# Patient Record
Sex: MALE | Race: WHITE | Employment: OTHER | URBAN - METROPOLITAN AREA
[De-identification: names, ages, dates, MRNs, and addresses within clinical notes are randomized per-mention and may not be internally consistent; named-entity substitution may affect disease eponyms.]

---

## 2021-12-02 ENCOUNTER — OFFICE VISIT (OUTPATIENT)
Dept: FAMILY MEDICINE CLINIC | Age: 46
End: 2021-12-02
Payer: COMMERCIAL

## 2021-12-02 VITALS
RESPIRATION RATE: 16 BRPM | OXYGEN SATURATION: 98 % | BODY MASS INDEX: 19.35 KG/M2 | SYSTOLIC BLOOD PRESSURE: 110 MMHG | TEMPERATURE: 98.3 F | HEART RATE: 75 BPM | WEIGHT: 105.13 LBS | DIASTOLIC BLOOD PRESSURE: 72 MMHG | HEIGHT: 62 IN

## 2021-12-02 DIAGNOSIS — Z13.220 ENCOUNTER FOR LIPID SCREENING FOR CARDIOVASCULAR DISEASE: ICD-10-CM

## 2021-12-02 DIAGNOSIS — H04.123 DRY EYES, BILATERAL: ICD-10-CM

## 2021-12-02 DIAGNOSIS — Z13.6 ENCOUNTER FOR LIPID SCREENING FOR CARDIOVASCULAR DISEASE: ICD-10-CM

## 2021-12-02 DIAGNOSIS — K59.09 CHRONIC CONSTIPATION: ICD-10-CM

## 2021-12-02 DIAGNOSIS — G71.00 MUSCULAR DYSTROPHY (HCC): Primary | ICD-10-CM

## 2021-12-02 DIAGNOSIS — H91.92 HEARING LOSS OF LEFT EAR, UNSPECIFIED HEARING LOSS TYPE: ICD-10-CM

## 2021-12-02 PROCEDURE — 99204 OFFICE O/P NEW MOD 45 MIN: CPT | Performed by: STUDENT IN AN ORGANIZED HEALTH CARE EDUCATION/TRAINING PROGRAM

## 2021-12-02 RX ORDER — HYPROMELLOSE 3 MG/G
10 GEL OPHTHALMIC
COMMUNITY
End: 2021-12-02 | Stop reason: SDUPTHER

## 2021-12-02 RX ORDER — HYPROMELLOSE 3 MG/G
10 GEL OPHTHALMIC
Qty: 1 EACH | Refills: 1 | Status: SHIPPED | OUTPATIENT
Start: 2021-12-02 | End: 2021-12-03 | Stop reason: CLARIF

## 2021-12-02 RX ORDER — POLYETHYLENE GLYCOL 400 AND PROPYLENE GLYCOL 4; 3 MG/ML; MG/ML
1 SOLUTION/ DROPS OPHTHALMIC 4 TIMES DAILY
COMMUNITY
End: 2021-12-02 | Stop reason: SDUPTHER

## 2021-12-02 RX ORDER — POLYETHYLENE GLYCOL 400 AND PROPYLENE GLYCOL 4; 3 MG/ML; MG/ML
1 SOLUTION/ DROPS OPHTHALMIC 4 TIMES DAILY
Qty: 1 EACH | Refills: 1 | Status: SHIPPED | OUTPATIENT
Start: 2021-12-02 | End: 2022-02-25 | Stop reason: SDUPTHER

## 2021-12-02 NOTE — PROGRESS NOTES
Chief Complaint   Patient presents with    New Patient    Muscular Dystrophy    Dry Eye    Irritable Bowel Syndrome     1. \"Have you been to the ER, urgent care clinic since your last visit? Hospitalized since your last visit? \" No    2. \"Have you seen or consulted any other health care providers outside of the [address] [place] [address] since your last visit? \" No     3. For patients aged 39-70: Has the patient had a colonoscopy / FIT/ Cologuard?  No     No immunization record noted on [employer]

## 2021-12-02 NOTE — PROGRESS NOTES
Manuel, 42 y.o.,  male presents to the office accompanied by his brother. Patient recently moved from [facility] and seeking established care. Chief Complaint   Patient presents with    New Patient    Muscular Dystrophy    Dry Eye    Constipation       SUBJECTIVE  History of present illness. History obtained from patient's brother. Verbal contact with patient was difficult due to cognitive impairment. 1.  Muscular dystrophy  Diagnosed at age 11-9. Followed with neurology in [facility]. No medications. Patient has difficulty walking, patient's brother requests wheelchair. 2.  Chronic constipation. Was on many different medications. Was on Trulance but exhausted medication. Fiber intake was increased with some relief. Followed with GI in [place]. Requests GI consult. History of cholelithiasis. 3.  Hearing loss  Reports hearing loss due to cerumen impaction and \"benign tumor\" on the left side. Followed with ENT specialist in [place]. Requests referral to ENT. 4. Dry eyes  Uses Systane artificial tears (drops and ointment) daily.   Exhausted medications and seeking refill    ROS:  General ROS: no fever, weight loss, fatigue  Ophthalmic ROS: no eye discharge, matting, pain, blurring of vision  ENT ROS: no congestion, sinus pressure, nasal discharge, sore throat  Respiratory ROS: no cough, shortness of breath, or wheezing  Cardiovascular ROS: no chest pain or dyspnea on exertion  Gastrointestinal ROS: no abdominal pain, change in bowel habits, or black or bloody stools  Genito-Urinary ROS: no dysuria, trouble voiding, or hematuria  Neurological ROS: no TIA or stroke symptoms  Dermatological ROS:no lesions no rash    OBJECTIVE    Physical Exam:     Visit Vitals  /72 (BP 1 Location: Left arm, BP Patient Position: Sitting, BP Cuff Size: Large adult)   Pulse 75   Temp 98.3 °F (36.8 °C) (Temporal)   Resp 16   Ht 5' 2\" (1.575 m)   Wt 105 lb 2 oz (47.7 kg)   SpO2 98%   BMI 19.23 kg/m² General: alert, well-appearing, in no apparent distress or pain  Head: atraumatic. Non-tender maxillary and frontal sinuses  Eyes: Drooping lids bilaterally noted, no discharge, no matting, conjunctivae clear and non injected, full EOMs, PERLLA  Ears: pinna non-tender, external auditory canal patent, TM intact  Mouth/throat: teeth, gums, palate normal appearing, moist oral mucosa, tonsils non enlarged, pharynx non erythematous and no lesion  Neck: supple, no JVD , no carotid bruit, no adenopathy palpated  CVS: normal rate, regular rhythm, distinct S1 and S2, no murmurs, rubs clicks or gallops  Lungs: Breathing not labored, good chest excursion, clear to ausculation bilaterally, no crackles, wheezing or rhonchi noted  Abdomen:soft, tender in LLQ, no organomegaly  Extremities: no edema  Skin: warm, no lesions, rashes noted  Psych: alert and oriented x3, mood normal      ASSESSMENT/PLAN  Diagnoses and all orders for this visit:    1. Muscular dystrophy ([facility] [address]  -     REFERRAL TO NEUROLOGY  -     CBC WITH AUTOMATED DIFF; Future  -     METABOLIC PANEL, COMPREHENSIVE; Future  Condition will be managed by the specialist.  Referral to neurology for further management. Labs ordered. Prescription for wheelchair provided. 2. Chronic constipation  -     REFERRAL TO GASTROENTEROLOGY  -     psyllium husk, aspartame, 3 gram pwpk; Take 3 g by mouth two (2) times a day. Start with 3 g once daily, than increase slowly to 2-3 times daily  Patient was advised to eat more fiber, drink plenty of fluids. Bulk forming laxative recommended. Patient was advised to start taking psyllium as prescribed. Drink plenty of fluids (~6 glasses/daily). Referral gastroenterology placed for further management. 3. Hearing loss of left ear, unspecified hearing loss type  -     REFERRAL TO ENT-OTOLARYNGOLOGY  4. Dry eyes, bilateral  -     peg 400-propylene glycol (Systane, propylene glycoL,) 0.4-0.3 % drop;  Administer 1 Drop to both eyes four (4) times daily. -     artificial tears,hypromellose, (Systane GeL) 0.3 % gel ophthalmic ointment; Administer 10 g to both eyes nightly. Continue using drops as prescribed. 5. Encounter for lipid screening for cardiovascular disease  -     LIPID PANEL; Future        Follow-up and Dispositions    · Return in about 7 weeks (around 1/20/2022) for annual physical exam and labs review.

## 2021-12-02 NOTE — PATIENT INSTRUCTIONS
Muscle Conditioning: Exercises  Introduction  Here are some examples of exercises for muscle conditioning. Start each exercise slowly. Ease off the exercise if you start to have pain. Your doctor or physical therapist will tell you when you can start these exercises and which ones will work best for you. How to do the exercises  Wall push-ups    When you can do this exercise against a wall comfortably (without your muscles feeling tired), you can try it against a counter. Start with 5 repetitions again and work up to 8 to 12. You can then slowly progress to the end of a couch or a sturdy chair, and finally to the floor. 1. Stand facing a wall, about 12 to 18 inches away. 2. Place your hands on the wall at shoulder height. 3. Slowly bend your elbows and bring your face toward the wall, moving your hips and shoulders forward together. 4. Push slowly back to the starting position. 5. Start with 5 repetitions and work up to 8 to 12. 6. Rest for a minute, and repeat the exercise. Knee extension    If this exercise becomes easy, you can add a light weight around your ankle or tie an elastic resistance band to a chair leg and one ankle. 1. While sitting in a chair, straighten one leg and hold while you slowly count to 5. Be sure you do not lock your knee. 2. Repeat 8 to 12 times. 3. Rest for a minute, and repeat the exercise. 4. Do the same exercise with the other leg. Side-lying leg lift    If this exercise becomes easy, you can add a light weight around your ankle or tie an elastic resistance band to both ankles. 1. Lie on your side, with your legs extended. Keep your hips straight up and down during this exercise. Do not let your top hip rock toward the back. Support your head with your hand, and place the other hand on the floor near your waist.  2. Slowly raise your upper leg until it is about in line with your shoulder. Keep your toes pointed forward.   3. Slowly lower your leg to the starting position. 4. Repeat 8 to 12 times. 5. Rest for a minute, and repeat the exercise. 6. Turn to your other side and do the same exercise with your other leg. Shallow standing knee bends    1. Stand with your hands lightly resting on a counter or chair in front of you with your feet shoulder-width apart. 2. Slowly bend your knees so that you squat down just like you were going to sit in a chair. Make sure your knees do not go in front of your toes. 3. Lower yourself about 6 inches. Your heels should remain on the floor at all times. 4. Rise slowly to a standing position. 5. Repeat 8 to 12 times. 6. Rest for a minute, and repeat the exercise. Follow-up care is a key part of your treatment and safety. Be sure to make and go to all appointments, and call your doctor if you are having problems. It's also a good idea to know your test results and keep a list of the medicines you take. Where can you learn more? Go to http://www.gray.com/  Enter P608 in the search box to learn more about \"Muscle Conditioning: Exercises. \"  Current as of: May 12, 2021               Content Version: 13.0  © 2006-2021 [employer]. Care instructions adapted under license by [employer] (which disclaims liability or warranty for this information). If you have questions about a medical condition or this instruction, always ask your healthcare professional. [place] [address] any warranty or liability for your use of this information. Muscular Dystrophy in Children: Care Instructions  Your Care Instructions  Finding out that your child has muscular dystrophy (MD) can be upsetting. You may be worried about your child's future. But many people with this disease live an active life. There are several types of muscular dystrophy. Each type affects the body differently. Some types show up early, and others show up later.  But all types are caused by inherited genes that weaken muscles. Your child's doctor may do tests to know what type your child has. These include genetic tests and a biopsy of your child's muscles. There are three common types of muscular dystrophy. · Duchenne muscular dystrophy mostly affects boys starting at age 1 to 11. Boys with Duchenne MD may be unable to walk by age 15. They may also need a respirator to breathe. · Facioscapulohumeral MD affects boys and girls in their teen years. This type causes weakness in the face, arm, and leg muscles. This weakness may be mild or disabling. · Myotonic MD can appear at any age in boys or girls and usually develops slowly. It causes muscle spasms that make it hard to relax. The muscles also weaken and get smaller. It can be mild or severe. There are many treatments to help your child stay as active as possible. These include medicine, physical therapy, and devices to support the muscles. You can work with your doctor to make a treatment plan. Raising a child who has muscular dystrophy can be hard. It may help to join a support group or talk with other parents who have a child with special needs, so you don't feel alone. You may also want to try counseling. It could help you understand and deal with all the emotions you may feel. Follow-up care is a key part of your child's treatment and safety. Be sure to make and go to all appointments, and call your doctor if your child is having problems. It's also a good idea to know your child's test results and keep a list of the medicines your child takes. How can you care for your child at home? · Learn how to do range-of-motion exercises with your child. These can help your child's joints stay flexible. They can also help keep the back straight. A physical therapist can help you set up a schedule and teach you how to do the exercises. · Talk to your doctor about special devices to help your child keep good posture and stay active.   ? Braces can help the hands and lower legs stay straight and be flexible. Back supports or corsets help keep the back straight. ? Standing walkers will help your child stand. This is important to keep bones strong and the back straight. ? Wheelchairs help a child with weak legs get around and do activities. · Be safe with medicines. Have your child take medicines exactly as prescribed. Call your doctor if you think your child is having a problem with his or her medicine. You will get more details on the specific medicines your doctor prescribes. · If your child has pain from inflamed joints, talk to your doctor about over-the-counter pain medicine. If he or she recommends it, give acetaminophen (Tylenol) or ibuprofen (Advil, Motrin) for fever, pain, or fussiness. Read and follow all instructions on the label. · Do not give your child two or more pain medicines at the same time unless the doctor told you to. Many pain medicines have acetaminophen, which is Tylenol. Too much acetaminophen (Tylenol) can be harmful. · If it's hard for your child to close his or her eyes completely, try eye patches or sleep masks at night. · Gently massage your child's limbs and joints. This can help with pain and stiffness. Heat will help too. Put a warm, moist cloth on the sore area. Handling the challenges of muscular dystrophy  · Learn about the disease. This will help you know what you can do to help your child. Then you don't have to fear the unknown. · Focus on your child's strengths. Let your child know that you love and believe in him or her. · Give your child some responsibility for his or her care. Children who have a say in their treatment often stay healthier. · Be aware of possible challenges. Children who have muscular dystrophy may have more social, emotional, and educational problems. · Consider joining a support group. If you share your experiences with parents who have challenges like yours, you may feel better.  You may also want to try counseling. · Be realistic. Do the best you can, and know that you can't control everything. When should you call for help? Call 911 anytime you think your child may need emergency care. For example, call if:    · Your child has trouble breathing or swallowing. Call your doctor now or seek immediate medical care if:    · Your child has any vision problems.     · Your child seems to be getting weaker. Watch closely for changes in your child's health, and be sure to contact your doctor if:    · Your child strains when having a bowel movement and seems to be constipated.     · You want to learn more about muscular dystrophy. Where can you learn more? Go to http://colleen-wild.info/  Enter G255 in the search box to learn more about \"Muscular Dystrophy in Children: Care Instructions. \"  Current as of: April 8, 2021               Content Version: 13.0  © 0647-8706 [employer]. Care instructions adapted under license by [employer] (which disclaims liability or warranty for this information). If you have questions about a medical condition or this instruction, always ask your healthcare professional. [address] any warranty or liability for your use of this information. Constipation: Care Instructions  Your Care Instructions     Constipation means that you have a hard time passing stools (bowel movements). People pass stools from 3 times a day to once every 3 days. What is normal for you may be different. Constipation may occur with pain in the rectum and cramping. The pain may get worse when you try to pass stools. Sometimes there are small amounts of bright red blood on toilet paper or the surface of stools. This is because of enlarged veins near the rectum (hemorrhoids). A few changes in your diet and lifestyle may help you avoid ongoing constipation. Your doctor may also prescribe medicine to help loosen your stool.   Some medicines can cause constipation. These include pain medicines and antidepressants. Tell your doctor about all the medicines you take. Your doctor may want to make a medicine change to ease your symptoms. Follow-up care is a key part of your treatment and safety. Be sure to make and go to all appointments, and call your doctor if you are having problems. It's also a good idea to know your test results and keep a list of the medicines you take. How can you care for yourself at home? · Drink plenty of fluids. If you have kidney, heart, or liver disease and have to limit fluids, talk with your doctor before you increase the amount of fluids you drink. · Include high-fiber foods in your diet each day. These include fruits, vegetables, beans, and whole grains. · Get at least 30 minutes of exercise on most days of the week. Walking is a good choice. You also may want to do other activities, such as running, swimming, cycling, or playing tennis or team sports. · Take a fiber supplement, such as Citrucel or Metamucil, every day. Read and follow all instructions on the label. · Schedule time each day for a bowel movement. A daily routine may help. Take your time having your bowel movement. · Support your feet with a small step stool when you sit on the toilet. This helps flex your hips and places your pelvis in a squatting position. · Your doctor may recommend an over-the-counter laxative to relieve your constipation. Examples are Milk of Magnesia and MiraLax. Read and follow all instructions on the label. Do not use laxatives on a long-term basis. When should you call for help? Call your doctor now or seek immediate medical care if:    · You have new or worse belly pain.     · You have new or worse nausea or vomiting.     · You have blood in your stools. Watch closely for changes in your health, and be sure to contact your doctor if:    · Your constipation is getting worse.     · You do not get better as expected. Where can you learn more? Go to http://www.PernixData.com/  Enter P343 in the search box to learn more about \"Constipation: Care Instructions. \"  Current as of: July 1, 2021               Content Version: 13.0  © 9310-5076 [employer]. Care instructions adapted under license by [employer] (which disclaims liability or warranty for this information). If you have questions about a medical condition or this instruction, always ask your healthcare professional. [place] [address] any warranty or liability for your use of this information.

## 2021-12-03 RX ORDER — HYPROMELLOSE 3 MG/G
1 GEL OPHTHALMIC
Qty: 10 G | Refills: 2 | Status: SHIPPED | OUTPATIENT
Start: 2021-12-03 | End: 2022-04-22

## 2022-01-06 ENCOUNTER — HOSPITAL ENCOUNTER (OUTPATIENT)
Dept: LAB | Age: 47
Discharge: HOME | End: 2022-01-06
Payer: COMMERCIAL

## 2022-01-06 DIAGNOSIS — Z13.220 ENCOUNTER FOR LIPID SCREENING FOR CARDIOVASCULAR DISEASE: ICD-10-CM

## 2022-01-06 DIAGNOSIS — Z13.6 ENCOUNTER FOR LIPID SCREENING FOR CARDIOVASCULAR DISEASE: ICD-10-CM

## 2022-01-06 DIAGNOSIS — G71.00 MUSCULAR DYSTROPHY (HCC): ICD-10-CM

## 2022-01-06 LAB
ALBUMIN SERPL-MCNC: 3.4 G/DL (ref 3.4–5)
ALBUMIN/GLOB SERPL: 1.3 {RATIO} (ref 0.8–1.7)
ALP SERPL-CCNC: 119 U/L (ref 45–117)
ALT SERPL-CCNC: 99 U/L (ref 16–61)
ANION GAP SERPL CALC-SCNC: 1 MMOL/L (ref 3–18)
AST SERPL-CCNC: 62 U/L (ref 10–38)
BASOPHILS # BLD: 0 K/UL (ref 0–0.1)
BASOPHILS NFR BLD: 1 % (ref 0–2)
BILIRUB SERPL-MCNC: 1.3 MG/DL (ref 0.2–1)
BUN SERPL-MCNC: 13 MG/DL (ref 7–18)
BUN/CREAT SERPL: 32 (ref 12–20)
CALCIUM SERPL-MCNC: 9 MG/DL (ref 8.5–10.1)
CHLORIDE SERPL-SCNC: 107 MMOL/L (ref 100–111)
CHOLEST SERPL-MCNC: 152 MG/DL
CO2 SERPL-SCNC: 33 MMOL/L (ref 21–32)
CREAT SERPL-MCNC: 0.41 MG/DL (ref 0.6–1.3)
DIFFERENTIAL METHOD BLD: ABNORMAL
EOSINOPHIL # BLD: 0.1 K/UL (ref 0–0.4)
EOSINOPHIL NFR BLD: 3 % (ref 0–5)
ERYTHROCYTE [DISTWIDTH] IN BLOOD BY AUTOMATED COUNT: 14.2 % (ref 11.6–14.5)
GLOBULIN SER CALC-MCNC: 2.7 G/DL (ref 2–4)
GLUCOSE SERPL-MCNC: 85 MG/DL (ref 74–99)
HCT VFR BLD AUTO: 46.5 % (ref 36–48)
HDLC SERPL-MCNC: 84 MG/DL (ref 40–60)
HDLC SERPL: 1.8 {RATIO} (ref 0–5)
HGB BLD-MCNC: 14.3 G/DL (ref 13–16)
IMM GRANULOCYTES # BLD AUTO: 0 K/UL (ref 0–0.04)
IMM GRANULOCYTES NFR BLD AUTO: 0 % (ref 0–0.5)
LDLC SERPL CALC-MCNC: 53.8 MG/DL (ref 0–100)
LIPID PROFILE,FLP: ABNORMAL
LYMPHOCYTES # BLD: 1.5 K/UL (ref 0.9–3.6)
LYMPHOCYTES NFR BLD: 36 % (ref 21–52)
MCH RBC QN AUTO: 28.7 PG (ref 24–34)
MCHC RBC AUTO-ENTMCNC: 30.8 G/DL (ref 31–37)
MCV RBC AUTO: 93.2 FL (ref 78–100)
MONOCYTES # BLD: 0.3 K/UL (ref 0.05–1.2)
MONOCYTES NFR BLD: 7 % (ref 3–10)
NEUTS SEG # BLD: 2.2 K/UL (ref 1.8–8)
NEUTS SEG NFR BLD: 53 % (ref 40–73)
NRBC # BLD: 0 K/UL (ref 0–0.01)
NRBC BLD-RTO: 0 PER 100 WBC
PLATELET # BLD AUTO: 173 K/UL (ref 135–420)
PMV BLD AUTO: 11.3 FL (ref 9.2–11.8)
POTASSIUM SERPL-SCNC: 5.2 MMOL/L (ref 3.5–5.5)
PROT SERPL-MCNC: 6.1 G/DL (ref 6.4–8.2)
RBC # BLD AUTO: 4.99 M/UL (ref 4.35–5.65)
SODIUM SERPL-SCNC: 141 MMOL/L (ref 136–145)
TRIGL SERPL-MCNC: 71 MG/DL (ref ?–150)
VLDLC SERPL CALC-MCNC: 14.2 MG/DL
WBC # BLD AUTO: 4.1 K/UL (ref 4.6–13.2)

## 2022-01-06 PROCEDURE — 80053 COMPREHEN METABOLIC PANEL: CPT

## 2022-01-06 PROCEDURE — 85025 COMPLETE CBC W/AUTO DIFF WBC: CPT

## 2022-01-06 PROCEDURE — 80061 LIPID PANEL: CPT

## 2022-01-06 PROCEDURE — 36415 COLL VENOUS BLD VENIPUNCTURE: CPT

## 2022-01-10 NOTE — PROGRESS NOTES
Please notify the patient regarding his lab results which showed elevated bilirubin, liver enzymes, alk phosphatase. Please advise patient to follow-up with gastroenterology. Referral to gastroenterology for further evaluation and management was placed on 12/2/2021.

## 2022-01-10 NOTE — PROGRESS NOTES
[phone] (home)  (HIPPA verified brother Miriam Connolly) 2 patient identifiers verified with Miriam Connolly in reference to his brother Nora Parra (name/). Miriam Connolly was advised that his brother Wenceslao Medley labs showed elevated bilirubin, liver enzymes, and alk phosphatase. Miriam Connolly was advised to keep new patient appointment for his brother Wenceslao Medley with [facility]. Per Miriam Truptiole he did not make the appointment that was scheduled in December because he though it was to far. Advised Miriam Connolly due to his brother's insurance [employer] he has to be seen by [facility], information given to contact  and labs fax to [phone]. Stress the importance of following up with GI and Miriam Console voice complete understanding.

## 2022-02-10 ENCOUNTER — TELEPHONE (OUTPATIENT)
Dept: FAMILY MEDICINE CLINIC | Age: 47
End: 2022-02-10

## 2022-02-10 NOTE — TELEPHONE ENCOUNTER
Pt brother came into the office to request a shower chair. Please call Feng Higgins at your earliest convenience.

## 2022-02-10 NOTE — TELEPHONE ENCOUNTER
Spoke with Mary Blood whom was informed that this would be address at his brother's upcoming appointment.

## 2022-02-25 ENCOUNTER — OFFICE VISIT (OUTPATIENT)
Dept: FAMILY MEDICINE CLINIC | Age: 47
End: 2022-02-25
Payer: COMMERCIAL

## 2022-02-25 VITALS
HEART RATE: 72 BPM | BODY MASS INDEX: 19.88 KG/M2 | RESPIRATION RATE: 16 BRPM | DIASTOLIC BLOOD PRESSURE: 62 MMHG | OXYGEN SATURATION: 100 % | HEIGHT: 62 IN | TEMPERATURE: 97.4 F | SYSTOLIC BLOOD PRESSURE: 94 MMHG | WEIGHT: 108 LBS

## 2022-02-25 DIAGNOSIS — H91.92 HEARING LOSS OF LEFT EAR, UNSPECIFIED HEARING LOSS TYPE: ICD-10-CM

## 2022-02-25 DIAGNOSIS — R26.89 BALANCE PROBLEM: ICD-10-CM

## 2022-02-25 DIAGNOSIS — H04.123 DRY EYES, BILATERAL: ICD-10-CM

## 2022-02-25 DIAGNOSIS — R79.89 ELEVATED LFTS: ICD-10-CM

## 2022-02-25 DIAGNOSIS — Q38.3 TONGUE ABNORMALITY: ICD-10-CM

## 2022-02-25 DIAGNOSIS — G71.00 MUSCULAR DYSTROPHY (HCC): Primary | ICD-10-CM

## 2022-02-25 DIAGNOSIS — K59.09 CHRONIC CONSTIPATION: ICD-10-CM

## 2022-02-25 PROCEDURE — 99214 OFFICE O/P EST MOD 30 MIN: CPT | Performed by: FAMILY MEDICINE

## 2022-02-25 RX ORDER — POLYETHYLENE GLYCOL 400 AND PROPYLENE GLYCOL 4; 3 MG/ML; MG/ML
2 SOLUTION/ DROPS OPHTHALMIC 4 TIMES DAILY
Qty: 30 ML | Refills: 1 | Status: SHIPPED | OUTPATIENT
Start: 2022-02-25 | End: 2022-04-22 | Stop reason: SDUPTHER

## 2022-02-25 NOTE — PATIENT INSTRUCTIONS
Muscular Dystrophy in Children: Care Instructions  Your Care Instructions  Finding out that your child has muscular dystrophy (MD) can be upsetting. You may be worried about your child's future. But many people with this disease live an active life. There are several types of muscular dystrophy. Each type affects the body differently. Some types show up early, and others show up later. But all types are caused by inherited genes that weaken muscles. Your child's doctor may do tests to know what type your child has. These include genetic tests and a biopsy of your child's muscles. There are three common types of muscular dystrophy. · Duchenne muscular dystrophy mostly affects boys starting at age 1 to 11. Boys with Duchenne MD may be unable to walk by age 15. They may also need a respirator to breathe. · Facioscapulohumeral MD affects boys and girls in their teen years. This type causes weakness in the face, arm, and leg muscles. This weakness may be mild or disabling. · Myotonic MD can appear at any age in boys or girls and usually develops slowly. It causes muscle spasms that make it hard to relax. The muscles also weaken and get smaller. It can be mild or severe. There are many treatments to help your child stay as active as possible. These include medicine, physical therapy, and devices to support the muscles. You can work with your doctor to make a treatment plan. Raising a child who has muscular dystrophy can be hard. It may help to join a support group or talk with other parents who have a child with special needs, so you don't feel alone. You may also want to try counseling. It could help you understand and deal with all the emotions you may feel. Follow-up care is a key part of your child's treatment and safety. Be sure to make and go to all appointments, and call your doctor if your child is having problems.  It's also a good idea to know your child's test results and keep a list of the medicines your child takes. How can you care for your child at home? · Learn how to do range-of-motion exercises with your child. These can help your child's joints stay flexible. They can also help keep the back straight. A physical therapist can help you set up a schedule and teach you how to do the exercises. · Talk to your doctor about special devices to help your child keep good posture and stay active. ? Braces can help the hands and lower legs stay straight and be flexible. Back supports or corsets help keep the back straight. ? Standing walkers will help your child stand. This is important to keep bones strong and the back straight. ? Wheelchairs help a child with weak legs get around and do activities. · Be safe with medicines. Have your child take medicines exactly as prescribed. Call your doctor if you think your child is having a problem with his or her medicine. You will get more details on the specific medicines your doctor prescribes. · If your child has pain from inflamed joints, talk to your doctor about over-the-counter pain medicine. If he or she recommends it, give acetaminophen (Tylenol) or ibuprofen (Advil, Motrin) for fever, pain, or fussiness. Read and follow all instructions on the label. · Do not give your child two or more pain medicines at the same time unless the doctor told you to. Many pain medicines have acetaminophen, which is Tylenol. Too much acetaminophen (Tylenol) can be harmful. · If it's hard for your child to close his or her eyes completely, try eye patches or sleep masks at night. · Gently massage your child's limbs and joints. This can help with pain and stiffness. Heat will help too. Put a warm, moist cloth on the sore area. Handling the challenges of muscular dystrophy  · Learn about the disease. This will help you know what you can do to help your child. Then you don't have to fear the unknown. · Focus on your child's strengths.  Let your child know that you love and believe in him or her. · Give your child some responsibility for his or her care. Children who have a say in their treatment often stay healthier. · Be aware of possible challenges. Children who have muscular dystrophy may have more social, emotional, and educational problems. · Consider joining a support group. If you share your experiences with parents who have challenges like yours, you may feel better. You may also want to try counseling. · Be realistic. Do the best you can, and know that you can't control everything. When should you call for help? Call 911 anytime you think your child may need emergency care. For example, call if:    · Your child has trouble breathing or swallowing. Call your doctor now or seek immediate medical care if:    · Your child has any vision problems.     · Your child seems to be getting weaker. Watch closely for changes in your child's health, and be sure to contact your doctor if:    · Your child strains when having a bowel movement and seems to be constipated.     · You want to learn more about muscular dystrophy. Where can you learn more? Go to http://www.gray.com/  Enter G255 in the search box to learn more about \"Muscular Dystrophy in Children: Care Instructions. \"  Current as of: April 8, 2021               Content Version: 13.0  © 3410-1683 [employer]. Care instructions adapted under license by [employer] (which disclaims liability or warranty for this information). If you have questions about a medical condition or this instruction, always ask your healthcare professional. [place] [address] any warranty or liability for your use of this information.

## 2022-02-25 NOTE — PROGRESS NOTES
Chief Complaint   Patient presents with    Muscular Dystrophy     requesting shower chair    Constipation     chronic    Other     needs some DME supplies and help with eye drop Rx     1. \"Have you been to the ER, urgent care clinic since your last visit? Hospitalized since your last visit? \" No    2. \"Have you seen or consulted any other health care providers outside of the [address] [place] [address] since your last visit? \" ENT- Dr. Raudel Lopez,      3. For patients aged 39-70: Has the patient had a colonoscopy / FIT/ Cologuard?  NA - based on age    Needs the following referrals:  Neurology- balance  Pulmonary- coughs routinely while eating

## 2022-02-25 NOTE — PROGRESS NOTES
HISTORY OF PRESENT ILLNESS  Cyril Riedel is a 42 y.o. male. HPI: New to me. Transferred from [facility] ([place], [place]). Recently patient moved to the area with his brother from [place] after her father passed away. Unable to obtain much history from patient as due to cognitive dysfunction. Most of the information came from brother  Patient sitting comfortable without any acute distress. History of muscular dystrophy. Currently waiting to get established with a local neurologist.  According to brother appetite has been low. He eats 1 meal a day. That has been going on since he moved here few months back. Looked well dressed. Skin tone and color fair. No signs of dehydration. Asking for shower chair as he has unsteady gait and balance trouble due to muscular dystrophy. Used to have a shower chair and now needed a new one. We will give a prescription. No recent fall. Walking with minimal help. Also reviewed prior labs. Noted elevated LFT and total bilirubin along with alkaline phosphatase. He does have history of gallstones. Denies any abdominal pain. Will obtain liver ultrasound. He supposed to see [facility] gastroenterologist and brother has not made a follow-up appointment as he is also new to the area and was not oriented to the [place] area. At this time provided their contact number and advised to make an appointment with the specialist to have further evaluation. We will get liver ultrasound meantime. Avoid fried and fatty food. No signs of anemia on labs. Vitals been fairly stable. He has macroglossia and bumpy tongue. Has seen ENT for hearing trouble. I do not have records to review. Will obtain records. Visit Vitals  BP 94/62 (BP 1 Location: Left arm, BP Patient Position: Sitting, BP Cuff Size: Adult)   Pulse 72   Temp 97.4 °F (36.3 °C) (Temporal)   Resp 16   Ht 5' 2\" (1.575 m)   Wt 108 lb (49 kg)   SpO2 100%   BMI 19.75 kg/m²     Review medication list, vitals, problem list,allergies. Lab Results   Component Value Date/Time    WBC 4.1 (L) 01/06/2022 03:06 PM    HGB 14.3 01/06/2022 03:06 PM    HCT 46.5 01/06/2022 03:06 PM    PLATELET 959 38/77/7089 03:06 PM    MCV 93.2 01/06/2022 03:06 PM     Lab Results   Component Value Date/Time    Sodium 141 01/06/2022 03:06 PM    Potassium 5.2 01/06/2022 03:06 PM    Chloride 107 01/06/2022 03:06 PM    CO2 33 (H) 01/06/2022 03:06 PM    Anion gap 1 (L) 01/06/2022 03:06 PM    Glucose 85 01/06/2022 03:06 PM    BUN 13 01/06/2022 03:06 PM    Creatinine 0.41 (L) 01/06/2022 03:06 PM    BUN/Creatinine ratio 32 (H) 01/06/2022 03:06 PM    GFR est AA >60 01/06/2022 03:06 PM    GFR est non-AA >60 01/06/2022 03:06 PM    Calcium 9.0 01/06/2022 03:06 PM    Bilirubin, total 1.3 (H) 01/06/2022 03:06 PM    Alk. phosphatase 119 (H) 01/06/2022 03:06 PM    Protein, total 6.1 (L) 01/06/2022 03:06 PM    Albumin 3.4 01/06/2022 03:06 PM    Globulin 2.7 01/06/2022 03:06 PM    A-G Ratio 1.3 01/06/2022 03:06 PM    ALT (SGPT) 99 (H) 01/06/2022 03:06 PM    AST (SGOT) 62 (H) 01/06/2022 03:06 PM     Lab Results   Component Value Date/Time    Cholesterol, total 152 01/06/2022 03:06 PM    HDL Cholesterol 84 (H) 01/06/2022 03:06 PM    LDL, calculated 53.8 01/06/2022 03:06 PM    VLDL, calculated 14.2 01/06/2022 03:06 PM    Triglyceride 71 01/06/2022 03:06 PM    CHOL/HDL Ratio 1.8 01/06/2022 03:06 PM     ROS: see HPI     Physical Exam    ASSESSMENT and PLAN    ICD-10-CM ICD-9-CM    1. Muscular dystrophy ([facility] [address]): Some balance trouble. Giving shower chair. Risk for fall. Also provided number for neurology to establish care locally G71.00 359.1 Shower Chair XX silvio   2. Chronic constipation: Symptomatic treatment. Sending to GI. Advised Metamucil over-the-counter K59.09 564.00 REFERRAL TO GASTROENTEROLOGY      US LIVER   3. Hearing loss of left ear, unspecified hearing loss type: Has seen ENT. Will obtain notes H91.92 389.9    4. Dry eyes, bilateral: Given symptomatic treatment H04. 123 375.15 5. Balance problem: Giving shower chair prescription and also advised to use support to prevent fall R26.89 781.99 Shower Chair XX silvio   6. Tongue abnormality: Advised to discuss it with the ENT Q38.3 750.10    7. Elevated LFTs: Obtaining liver ultrasound and provider #4 gastroenterologist to make an appointment R79.89 790.6 [address] [place] [address] understood the plan and agree to proceed with it  Follow-up and Dispositions    · Return in about 2 months (around 4/25/2022). Please note that this dictation was completed with [employer], the [employer] voice recognition software. Quite often unanticipated grammatical, syntax, homophones, and other interpretive errors are inadvertently transcribed by the computer software. Please disregard these errors. Please excuse any errors that have escaped final proofreading.

## 2022-03-28 ENCOUNTER — OFFICE VISIT (OUTPATIENT)
Dept: NEUROLOGY | Age: 47
End: 2022-03-28
Payer: COMMERCIAL

## 2022-03-28 VITALS
OXYGEN SATURATION: 98 % | BODY MASS INDEX: 19.32 KG/M2 | DIASTOLIC BLOOD PRESSURE: 60 MMHG | HEART RATE: 100 BPM | SYSTOLIC BLOOD PRESSURE: 100 MMHG | RESPIRATION RATE: 16 BRPM | HEIGHT: 62 IN | WEIGHT: 105 LBS

## 2022-03-28 DIAGNOSIS — G71.11 MYOTONIC DYSTROPHY, TYPE 1 (HCC): Primary | ICD-10-CM

## 2022-03-28 DIAGNOSIS — G71.11 MYOTONIC DYSTROPHY, TYPE 1 (HCC): ICD-10-CM

## 2022-03-28 PROCEDURE — 99204 OFFICE O/P NEW MOD 45 MIN: CPT | Performed by: PSYCHIATRY & NEUROLOGY

## 2022-03-28 NOTE — PROGRESS NOTES
Jeff Howard presents today for   Chief Complaint   Patient presents with    Neurologic Problem    New Patient       Is someone accompanying this pt? Yes, Brother    Is the patient using any DME equipment during [address]? no    Depression Screening:  3 most recent PHQ Screens 12/2/2021   Little interest or pleasure in doing things Not at all   Feeling down, depressed, irritable, or hopeless Not at all   Total Score PHQ 2 0       Learning Assessment:  Learning Assessment 12/2/2021   PRIMARY LEARNER Patient   HIGHEST LEVEL OF EDUCATION - PRIMARY LEARNER  GRADUATED HIGH SCHOOL OR GED   BARRIERS PRIMARY LEARNER [address] CAREGIVER Yes   CO-LEARNER NAME Genny Burrows (brother)   [id number]    PRIMARY LANGUAGE ENGLISH   PRIMARY LANGUAGE CO-LEARNER ENGLISH    NEED No   LEARNER PREFERENCE PRIMARY DEMONSTRATION     LISTENING     READING     VIDEOS     OTHER (COMMENT)   LEARNER [address] No   ANSWERED BY Jared Loza (brother)   RELATIONSHIP SELF       Abuse Screening:  Abuse Screening Questionnaire 12/2/2021   Do you ever feel afraid of your partner? N   Are you in a relationship with someone who physically or mentally threatens you? N   Is it safe for you to go home? Y       Fall Risk  No flowsheet data found. Coordination of Care:  1. Have you been to the ER, urgent care clinic since your last visit? Hospitalized since your last visit? no    2. Have you seen or consulted any other health care providers outside of the [address] [place] [address] since your last visit? Include any pap smears or colon screening.  no

## 2022-03-28 NOTE — PROGRESS NOTES
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 Patient cannot walk without assistance, ptosis, lower extremity weakness, face is long and narrow, tongue fissures, lower extremity weakness, patient slow but understand and follows commands, , Gi problems, constipation, trouble swallow. Social History     Socioeconomic History    Marital status: SINGLE     Spouse name: Not on file    Number of children: Not on file    Years of education: Not on file    Highest education level: Not on file   Occupational History    Not on file   Tobacco Use    Smoking status: Never Smoker    Smokeless tobacco: Never Used   Vaping Use    Vaping Use: Never used   Substance and Sexual Activity    Alcohol use: Never    Drug use: Never    Sexual activity: Never   Other Topics Concern    Not on file   Social History Narrative    Not on file     Social Determinants of Health     Financial Resource Strain:     Difficulty of Paying Living Expenses: Not on file   Food Insecurity:     Worried About Running Out of Food in the Last Year: Not on file    Jeff of Food in the Last Year: Not on file   Transportation Needs:     Lack of Transportation (Medical): Not on file    Lack of Transportation (Non-Medical):  Not on file   Physical Activity:     Days of Exercise per Week: Not on file    Minutes of Exercise per Session: Not on file   Stress:     Feeling of Stress : Not on file   Social Connections:     Frequency of Communication with Friends and Family: Not on file    Frequency of Social Gatherings with Friends and Family: Not on file    Attends Restorationist Services: Not on file    Active Member of Clubs or Organizations: Not on file    Attends Club or Organization Meetings: Not on file    Marital Status: Not on file   Intimate Partner Violence:     Fear of Current or Ex-Partner: Not on file    Emotionally Abused: Not on file    Physically Abused: Not on file    Sexually Abused: Not on file   Housing Stability:     Unable to Pay for Housing in the Last Year: Not on file    Number of [place] in the Last Year: Not on file    Unstable Housing in the Last Year: Not on file       History reviewed. No pertinent family history. Current Outpatient Medications   Medication Sig Dispense Refill    Shower Chair XX silvio Need during shower 1 Each 0    Systane, propylene glycoL, 0.4-0.3 % drop Administer 2 Drops to both eyes four (4) times daily. 30 mL 1    artificial tears,hypromellose, (Systane GeL) 0.3 % gel ophthalmic ointment Administer 1 g to both eyes nightly. 10 g 2    psyllium husk, aspartame, 3 gram pwpk Take 3 g by mouth two (2) times a day. Start with 3 g once daily, than increase slowly to 2-3 times daily 54 Each 0       History reviewed. No pertinent past medical history. No past surgical history on file. No Known Allergies    There is no problem list on file for this patient. Review of Systems:   As above       PHYSICAL EXAMINATION:      VITAL SIGNS:    Visit Vitals  /60 Comment: retaken   Pulse 100 Comment: retaken   Resp 16   Ht 5' 2\" (1.575 m)   Wt 47.6 kg (105 lb)   SpO2 98%   BMI 19.20 kg/m²       GENERAL: The patient is in no apparent distress. EXTREMITIES: No clubbing, cyanosis, or edema is identified.      NEUROLOGIC EXAMINATION  Mental status: Awake oriented to month but not to year, oriented to place and follow simple commands, speech not clear, word finding difficulty  CN: visual field seems intact, ptosis, limited lateral gaze B/L, PERRLA, face sensation intact , long and narrow face,  5/5 bilat, tongue midline with fissures  Motor: upper good strength poor effort, mild hypotonia, lower extremity iliopsoas4/5, QA-4/5, AT and GA-4-5/5, with left toe weakness, neck flexor weakness. Sensory: intact to light touch throughout  Coordination: FN w/o dysmetria  DTR: 2+ throughout, toes-0, plantar equivocal.  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 Patient cannot walk without assistance, ptosis, lower extremity weakness, face is long and narrow, tongue fissures, lower extremity weakness, patient slow but understand and follows commands, , Gi problems, constipation, trouble swallow. Patient denies pain or paresthesias. MYOTONIC DYSTROPHY TYPE 1    1-PT AND OT  2-Swallow evaluation. 3-Check basic labs  TSH, B 12, HB AIC. 4-Primary care to evaluate for cardiology consult for arrhythmias, pulmonary evaluation, constipation. 5-Ophthalmology follow up.  5-mri brain. 6-referral to Neuromuscular clinic. Plan: As above. I spent 45 minutes with the patient in face-to-face consultation, of which greater than 50% was spent in counseling and coordination of care as described above. PLEASE NOTE:   This document has been produced using voice recognition software. Unrecognized errors in transcription may be present.

## 2022-03-30 DIAGNOSIS — G71.11 MYOTONIC DYSTROPHY, TYPE 1 (HCC): Primary | ICD-10-CM

## 2022-04-08 ENCOUNTER — TELEPHONE (OUTPATIENT)
Dept: FAMILY MEDICINE CLINIC | Age: 47
End: 2022-04-08

## 2022-04-08 NOTE — TELEPHONE ENCOUNTER
Pt's brother called stating he was supposed to receive a shower chair and they have not heard anything. Pt took a fall on yesterday, brother states he seems okay, no physical signs of hurt. Please advise.

## 2022-04-12 ENCOUNTER — HOSPITAL ENCOUNTER (OUTPATIENT)
Dept: PHYSICAL THERAPY | Age: 47
Discharge: HOME | End: 2022-04-12
Payer: COMMERCIAL

## 2022-04-12 PROCEDURE — 97162 PT EVAL MOD COMPLEX 30 MIN: CPT

## 2022-04-12 PROCEDURE — 97167 OT EVAL HIGH COMPLEX 60 MIN: CPT

## 2022-04-12 NOTE — PROGRESS NOTES
PT DAILY TREATMENT NOTE/NEURO EVAL     Patient Name: Makayla Elliott  Date:2022  : 1975  [x]  Patient  Verified  Payor: [address] / Plan: [address] / Product Type: Managed Care Medicaid /    In time: 2:18  Out time:3:05  Total Treatment Time (min): 52  Visit #: 1 of 8    Treatment Area: Muscle weakness [M62.81]    SUBJECTIVE  Pain Level (0-10 scale): 0/10   []constant []intermittent []improving []worsening []no change since onset    Any medication changes, allergies to medications, adverse drug reactions, diagnosis change, or new procedure performed?: [x] No    [] Yes (see summary sheet for update)  Subjective functional status/changes:     PLOF: Patient lives with brother and has a home attendant with him to assist as needed. Limitations to PLOF: decreased stability/balance, weakness, impaired gait   Mechanism of Injury: Patient presents with generalized muscle weakness. 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 Patient's brother and home attendant is with him during the evaluation. Patient's brother answered most of the questions as patient has some verbal deficits and difficulty answering more of the complex questions. Patient is able to follow simple commands. Patient has some impulsive tendencies and decreased safety awareness. Per patient's brother he doesn't typically experience pain in arms and legs. According to patient's brother the patient frequently falls, most recent being 22. Patient's brother stated he missed the bed and fell. Per patient's brother no lasting injury, and brother monitored patient throughout the day and was fine, so patient did not go to the hospital. Patient stated he frequently trips and has a tendency to scuff his toe. Patient's brother indicated he walks bare foot on the carpet. Patient does not use an AD, but stated he uses furniture/walls to assist with stability.  For community ambulation, patient's brother pushes him in a WC sometimes, due to concern about him walking on gravel/rocks. Current symptoms/Complaints: Patient has increased difficulty with walking, sit to stand transfers, poor cervical control, visual disturbance and tendency to move quickly out of the bed. Patient also has difficulty navigating curb independently to get into the apartment, and patient's brother stated his legs buckle frequently (onset approximately January 2022), and also occasionally legs will lock. Previous Treatment/Compliance: No prior PT. Patient lived in [facility] and had difficulty getting into PT. PMHx/Surgical Hx: 2019- cataracts removed. According to the patient's brother: No previous Back/hip/knee ankle/foot surg. No pacemaker, no cardiac issues, and no history of cancer. Work Hx: n/a   Living Situation: 1 step into building with no rail. Pt Goals: in chart   Cognition: A & O x  2  Other:    OBJECTIVE/EXAMINATION  Domestic Life: someone is typically always home with patient. If patient is sleeping patient's brother may step out for an hour, but typically has someone with him. Self Care: dressing with min assist or supervised by patient's brother. 32 min [x]Eval                  []Re-Eval       15 min Therapeutic Exercise:  [x] See flow sheet : seated: HR, march, LAQ- 3 reps, Stand: hip abduction-3x, mini squat-3x, side step- 2x   Rationale: increase ROM and increase strength to improve the patients ability to perform ADLs safely. With   [] TE   [] TA   [] neuro   [] other: Patient Education: [x] Review HEP    [] Progressed/Changed HEP based on:   [] positioning   [] body mechanics   [] transfers   [] heat/ice application    [] other:      Other Objective/Functional Measures:     Physical Therapy Evaluation  Neurologic    Posture: [] Poor    [x] Fair    [] Good    Describe:  Forward head/rounded shoulder        Gait: [] Normal    [x] Abnormal    Device: No AD Describe: decreased step/stride length, decreased body awareness, decreased stability and needed gait belt with CGA/Min assist to correct imbalances, and for occasional knees buckling. ROM:                                 AROM    PROM   Knee Left Right Left Right   Ext [facility]     Flex [facility] WFL               AROM                           PROM  Hip Left Right Left Right   Flex [facility] WFL     Ext       [facility]     ER [facility] WFL     IR                                           Strength (MMT):  Shoulder L (1-5) R (1-5)   Shoulder Flexion     Shoulder Ext     Shoulder ABD     Shoulder ADD     Shoulder IR     Shoulder ER                                               Hip L (1-5) R (1-5)   Hip Flexion 4- 4-   Hip Ext     Hip ABD 4- 4-   Hip ADD     Hip ER     Hip IR       Knee L (1-5) R (1-5)   Knee Flexion 4 4   Knee Extension 4 4   Ankle PF     Ankle DF 3+ 3+   Other       Motor Control: alternating toe tap was [facility]     Sensation: light touch sensation assessed and patient confirmed he could feel the light touch on Lower leg. Balance/ Equilibrium:    Standing on Floor: Romberg EO/EC therapist provided CGA due to concern of instability and knee buckling. Functional Mobility      Bed Mobility:      Scooting: Able to perform a bridge with buttock clearance. Rolling: difficulty rolling toward the right. SBA with        Sit-Supine: SBA. Monitored for neck control       Transfers:       Sit-Stand: SBA/CGA        Behavior: [x] Cooperative    [x] Impulsive    [] Agitated    [] Perseverative    [] Confused   Oriented x:2    Cognition: [x] One Step Commands   [] Multiple Commands   [] Displays Neglect [] R  [] L    Other:       Impaired Vision:  [x] Y    [] N      Safety Awareness Deficits  [x] Y    [] N      Able to Express Needs [x] Y    [] N-  Patient was able to converse with Therapist, but therapist had some difficulty understanding at points.      Other test /comments:       Pain Level (0-10 scale) post treatment: 0/10    ASSESSMENT/Changes in Function: See POC. Patient reported no pain at end of the session, and confirmed throughout the evaluation that he was not experiencing pain with the exercises. Therapist used a gait belt for standing exercises secondary to safety concerns. Patient will continue to benefit from skilled PT services to modify and progress therapeutic interventions, address functional mobility deficits, address ROM deficits, address strength deficits, analyze and address soft tissue restrictions, analyze and cue movement patterns, analyze and modify body mechanics/ergonomics, assess and modify postural abnormalities, address imbalance/dizziness and instruct in home and community integration to attain remaining goals.      [x]  See Plan of Care  []  See progress note/recertification  []  See Discharge Summary         Progress towards goals / Updated goals:  See POC    PLAN  []  Upgrade activities as tolerated     [x]  Continue plan of care  []  Update interventions per flow sheet       []  Discharge due to:_  []  Other:_      Andrea Walter, PT 4/12/2022  1:16 PM

## 2022-04-12 NOTE — PROGRESS NOTES
[facility] [place] [employer] [facility]  [address] [facility]  [phone] fax    Plan of Care/Statement of Necessity for Occupational Therapy Services    Patient name: Annika Enriquez Start of Care: 2022   Referral source: Khoa Yoo MD : 1975    Medical Diagnosis: Muscle weakness [M62.81]  Payor: [address] [place] [address] Road / Plan: [address] / Product Type: Managed Care Medicaid /  Onset Date:Birth    Treatment Diagnosis: WEakness B UEs   Prior Hospitalization: see medical history Provider#: [id number]   Medications: Verified on Patient summary List    Comorbidities: *myotonic muscular dystrophy**   Prior Level of Function: I self care          The Plan of Care and following information is based on the information from the initial evaluation. Assessment/ key information: 42year old RHD male with history of developmental delay and hearing loss in addition to myotonic muscular dystrophy who has recently moved to area with brother following death of father. Patient lives with brother in apartment with aide 8 hours per day. He requires supervision at all times due to falls and fall risk. His brother acts as historian. Patient is currently able to eat and dress self with pull on clothing but cannot tie shoes adequately for safety due to weakness. He requires assist for bathing due to safety and lack of shower chair which is being pursued through MD office. Patient has full AROM in [address] [address] with extremely poor  of 10# and   Pinches of 4-6#. Fine motor speed is decreased bilaterally at 25-26 seconds. His general UE strength is grossly 3-4/5. His FOTO is 23/100 reflecting very severe impairment. He will benefit from skilled occupational therapy ot improve ADL status and safety and provide with home program of exercises and activities to maximize strength and functional independence.       Evaluation Complexity: History HIGH Complexity : Extensive review of history including physical, cognitive and psychosocial history  Examination HIGH Complexity : 5 or more performance deficits relating to physical, cognitive , or psychosocial skils that result in activity limitations and / or participation restrictions Clinical Decision Making HIGH Complexity : Patient presents with comorbidities that affect occupational performance. Signifigant modification of tasks or assistance (eg, physical or verbal) with assessment (s) is necessary to enable patient to complete evaluation   Overall Complexity Rating: HIGH     Problem List: Decreased strength, Decreased coordination/prehension, Decreased ADL/functional abilities  and Decreased activity tolerance     Treatment Plan may include any combination of the following: Therapeutic exercise, Therapeutic activities, Patient education and ADLs/IADLs    Patient / Family readiness to learn indicated by: trying to perform skills and interest    Persons(s) to be included in education:   patient (P) and family support person (FSP);list brother, caregiver aide    Barriers to Learning/Limitations: yes;  cognitive and sensory deficits-vision/hearing/speech    Patient Goal (s): Brother, safer, more independent, stronger    Patient Self Reported Health Status: fair    Rehabilitation Potential: good    Short Term Goals: To be accomplished in 2 weeks:  1. Caregiver to be familiar with adaptive equipment to ease bathing. 2. Patient will be independent in home exercise program for UE strengthening within limits of diagnosis. Long Term Goals: To be accomplished in 4 weeks:   1. Patient will be minimal assist for bathing for safety. 2.  Patient will be able to participate in desired activities incorporating goals of improving strength. 3.  Patient will improve self care independence as shown by increase in FOTO of at least 10 points.       Frequency / Duration: Patient to be seen 2 times per week for 4 weeks:    Patient/ Caregiver education and instruction: Diagnosis, prognosis, self care, activity modification and exercises   [x]  Plan of care has been reviewed with AKANKSHA Carter/L 4/12/2022 5:49 PM    _____________________________________________________________________    I certify that the above Therapy Services are being furnished while the patient is under my care. I agree with the treatment plan and certify that this therapy is necessary.     [de-identified] Signature:____________Date:_________TIME:________     Meagan Montes MD  ** Signature, Date and Time must be completed for valid certification **    Please sign and return to [facility]   [address] [place] [address]  [phone] fax

## 2022-04-12 NOTE — PROGRESS NOTES
OT DAILY TREATMENT NOTE     Patient Name: Louann Sewell  Date:2022  : 1975  [x]  Patient  Verified  Payor: [address] [place] [address] Road / Plan: [address] / Product Type: Managed Care Medicaid /    In time:130  Out time:205  Total Treatment Time (min): 35  Visit #: 1 of 8    Treatment Area: Muscle weakness [M62.81]    SUBJECTIVE  Pain Level (0-10 scale): 1/10  Any medication changes, allergies to medications, adverse drug reactions, diagnosis change, or new procedure performed?: [x] No    [] Yes (see summary sheet for update)  Subjective functional status/changes:   [] No changes reported  Stomach pain    OBJECTIVE            With   [] TE   [] TA   [] neuro   [] other: Patient Education: [x] Review HEP    [] Progressed/Changed HEP based on:  OT POC  [] positioning   [] body mechanics   [] transfers   [] heat/ice application   [] Splint wear/care   [] Sensory re-education   [] scar management      [] other:            Other Objective/Functional Measures:   Subjective: pt is a right hand dominant, 42 y.o.y/o, male who   Prior level of function: I self care  Pain level:(0-no pain 10-debilitating pain) mild   Current functional limitations/living situation: With brother in first floor apartment with caregiver 8 hours day weekdays (fall prevention, )  Needs help with bathing    Medical hx: Muscular dystrophy    Medications: See the written copy of this report in the patient's paper medical record.        Objective:    Range of Motion:WFL  Strength:    Right Left   Shoulder Flex 3+ 3+          abd 3+ 3+                     Elbow Ext/flex 3/4 3/4   Forearm Supination 4 4    Pronation 3 3   Wrist Flex 3+ 3+    Ext 4 4                 Hand ROM WNL  Hand Strength:   Gross Grasp 3pt Pinch Lateral Pinch Tip Pinch   Right  10 4 4 6   Left 10 4 4 4     Nine-Hole Peg Test:  Left= __26___seconds  Right=_25____seconds  Finger Opposition:    ADLs  Feeding:        []MaxA   []ModA   [x]Maynor   [] CGA []SBA   []Joy   []Independent  UE Dressing:       []MaxA   []ModA   []Maynor   [] CGA   []SBA   []Joy   [x]Independent  LE Dressing:       []MaxA   []ModA   [x]Maynor   [] CGA   []SBA   []Joy   []Independent  Grooming:       []MaxA   []ModA   [x]Maynor   [] CGA   []SBA   []Joy   []Independent  Toileting:       []MaxA   []ModA   []Maynor   [] CGA   []SBA   []Joy   [x]Independent  Bathing:       []MaxA   [x]ModA   []Maynor   [] CGA   []SBA   []Joy   []Independent  Light Meal Prep:    [x]MaxA   []ModA   []Maynor   [] CGA   []SBA   []Joy   []Independent  Household/Other: [x]MaxA   []ModA   []Maynor   [] CGA   []SBA   []Joy   []Independent  Adaptive Equip:     []MaxA   []ModA   []Maynor   [] CGA   []SBA   []Joy   []Independent  Driving:       [x]MaxA   []ModA   []Manyor   [] CGA   []SBA   []Joy   []Independent  Money Mgmt:        [x]MaxA   []ModA   []Maynor   [] CGA   []SBA   []Joy   []Independent       Pain Level (0-10 scale) post treatment: 1/10    ASSESSMENT/Changes in Function:      [x]  See Plan of Care  []  See progress note/recertification  []  See Discharge Summary             PLAN  []  Upgrade activities as tolerated     []  Continue plan of care  []  Update interventions per flow sheet       []  Discharge due to:_  []  Other:_      Tj Colon OTR/L 4/12/2022  1:12 PM    Future Appointments   Date Time Provider [place]   4/12/2022  1:30 PM Sheryle Conn, OTR/L [facility]   4/12/2022  2:15 PM Fransisco Camp, PT [facility]   4/22/2022 10:45 AM Matt Smith MD [facility]

## 2022-04-12 NOTE — PROGRESS NOTES
[facility] COMPANY OF [address]  [phone] fax    Plan of Care/ Statement of Necessity for Physical Therapy Services    Patient name: Shay Downing Start of Care: 2022   Referral source: Ish Russ MD : 1975    Medical Diagnosis: Muscle weakness [M62.81]  Payor: [address] [place] [address] Road / Plan: [address] / Product Type: Managed Care Medicaid /  Onset Date: 2022    Treatment Diagnosis: Impaired gait/balance; Muscle weakness    Prior Hospitalization: see medical history Provider#: [id number]   Medications: Verified on Patient summary List    Comorbidities:Neurological disease, Myotonic Dystrophy Type 1 Back pain, GI disease, Hearing impairment, other disorders, Visual impairment-2019- cataracts surgery   Prior Level of Function: Patient lives with brother and has a home attendant with him to assist as needed. The Plan of Care and following information is based on the information from the initial evaluation. Assessment/ key information: Patient presents with generalized muscle weakness. 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 Patient's brother and home attendant is with him during the evaluation. Patient's brother answered most of the questions as patient has some verbal deficits and difficulty answering more of the complex questions. Patient has some impulsive tendencies and decreased safety awareness. According to patient's brother the patient frequently falls, most recent being 22. Patient's brother stated he missed the bed and fell. Per patient's brother no lasting injury, and brother monitored patient throughout the day and was fine, so patient did not go to the hospital. Patient stated he frequently trips and has a tendency to scuff his toe. Patient does not use an AD, but reported using furniture/walls to assist with stability.  For community ambulation, patient's brother pushes him in a WC sometimes, due to concern about him walking on gravel/rocks. Patient has increased difficulty with walking, sit to stand transfers, decreased cervical control, visual disturbance and tendency to move quickly out of the bed. Patient also has difficulty navigating curb independently to get into the apartment, and patient's brother stated his legs buckle frequently (onset approximately January 2022), and  also occasionally legs will lock. Patient demonstrates impaired gait consisting of decreased step/stride length, decreased body awareness, decreased stability and needed gait belt with CGA/Min assist to correct imbalances, and for occasional knees buckling. Patient exhibits decreased stability with balance activities, decreased (B) LE strength, increased difficulty with bed mobility/sit to stand transfers, and decreased core stability. Patient demonstrates potential to make functional gains within a reasonable time frame. Patient would benefit from skilled PT to address above deficits and assist with return to PLOF. Evaluation Complexity History MEDIUM  Complexity : 1-2 comorbidities / personal factors will impact the outcome/ POC ; Examination MEDIUM Complexity : 3 Standardized tests and measures addressing body structure, function, activity limitation and / or participation in recreation  ;Presentation MEDIUM Complexity : Evolving with changing characteristics  ; Clinical Decision Making MEDIUM Complexity : FOTO score of 26-74  Overall Complexity Rating: MEDIUM  Problem List: pain affecting function, decrease ROM, decrease strength, impaired gait/ balance, decrease ADL/ functional abilitiies, decrease activity tolerance, decrease flexibility/ joint mobility and decrease transfer abilities   Treatment Plan may include any combination of the following: Therapeutic exercise, Therapeutic activities, Neuromuscular re-education, Physical agent/modality, Gait/balance training, Manual therapy, Patient education, Self Care training, Functional mobility training, Home safety training and Stair training  Patient / Family readiness to learn indicated by: asking questions, trying to perform skills and interest  Persons(s) to be included in education: patient (P)  Barriers to Learning/Limitations: None  Patient Goal (s): Better mobility and strength  Patient Self Reported Health Status: fair  Rehabilitation Potential: good    Short Term Goals: To be accomplished in 2 weeks:   1. Patient will be independent and compliant with HEP 1-2x/day to increase ease with ADLs. Eval:  HEP established   2. Patient will be able to maintain MSR stance on floor with EO for 30 seconds without LOB to increase safety with self care acts. Eval: Therapist provided CGA with balance activities for safety. Long Term Goals: To be accomplished in 4 weeks:   1. Patient will improve FOTO to at least 49 to demonstrate improved function. Eval: FOTO: 41   2. Patient will be able to ambulate 100 feet with SBA without instances of instability to increase safety with home navigation. Eval: Patient required CGA/Min A for safety and correct imbalances. 3. Patient will be able to perform 5 sit to stand transfers with good technique and with SBA to increase ease with car transfers. Eval: Patient able to perform 1 sit to stand transfer from chair with SBA. 4. Patient will be able to perform 1 step up onto 6inch step without UE support to increase safety with negotiating curb to get into home. Eval. Patient has difficulty negotiating curb into home. Frequency / Duration: Patient to be seen 2 times per week for 4 weeks.     Patient/  Caregiver education and instruction: Diagnosis, prognosis, exercises   [x]  Plan of care has been reviewed with VALENTE Alves, PT 4/12/2022 1:14 PM    ________________________________________________________________________    I certify that the above Therapy Services are being furnished while the patient is under my care. I agree with the treatment plan and certify that this therapy is necessary.     [de-identified] Signature:____________Date:_________TIME:________     Annetta Eugene MD  ** Signature, Date and Time must be completed for valid certification **    Please sign and return to [facility]  [address] [facility]  [phone] fax

## 2022-04-12 NOTE — TELEPHONE ENCOUNTER
Orders for shower chair faxed to [facility] per office note.

## 2022-04-22 ENCOUNTER — OFFICE VISIT (OUTPATIENT)
Dept: FAMILY MEDICINE CLINIC | Age: 47
End: 2022-04-22
Payer: COMMERCIAL

## 2022-04-22 VITALS
HEART RATE: 74 BPM | RESPIRATION RATE: 14 BRPM | BODY MASS INDEX: 19.32 KG/M2 | WEIGHT: 105 LBS | TEMPERATURE: 97.7 F | HEIGHT: 62 IN | DIASTOLIC BLOOD PRESSURE: 62 MMHG | OXYGEN SATURATION: 98 % | SYSTOLIC BLOOD PRESSURE: 98 MMHG

## 2022-04-22 DIAGNOSIS — R26.89 BALANCE PROBLEM: ICD-10-CM

## 2022-04-22 DIAGNOSIS — R79.89 ELEVATED LFTS: ICD-10-CM

## 2022-04-22 DIAGNOSIS — G71.00 MUSCULAR DYSTROPHY (HCC): Primary | ICD-10-CM

## 2022-04-22 DIAGNOSIS — H91.92 HEARING LOSS OF LEFT EAR, UNSPECIFIED HEARING LOSS TYPE: ICD-10-CM

## 2022-04-22 DIAGNOSIS — K59.09 CHRONIC CONSTIPATION: ICD-10-CM

## 2022-04-22 DIAGNOSIS — Z78.9 DECREASED ACTIVITIES OF DAILY LIVING (ADL): ICD-10-CM

## 2022-04-22 DIAGNOSIS — H04.123 DRY EYES, BILATERAL: ICD-10-CM

## 2022-04-22 PROCEDURE — 99214 OFFICE O/P EST MOD 30 MIN: CPT | Performed by: FAMILY MEDICINE

## 2022-04-22 RX ORDER — POLYETHYLENE GLYCOL 400 AND PROPYLENE GLYCOL 4; 3 MG/ML; MG/ML
2 SOLUTION/ DROPS OPHTHALMIC 4 TIMES DAILY
Qty: 30 ML | Refills: 1 | Status: SHIPPED | OUTPATIENT
Start: 2022-04-22 | End: 2022-08-26 | Stop reason: SDUPTHER

## 2022-04-22 NOTE — PATIENT INSTRUCTIONS
Muscular Dystrophy in Children: Care Instructions  Your Care Instructions  Finding out that your child has muscular dystrophy (MD) can be upsetting. You may be worried about your child's future. But many people with this disease live an active life. There are several types of muscular dystrophy. Each type affects the body differently. Some types show up early, and others show up later. But all types are caused by inherited genes that weaken muscles. Your child's doctor may do tests to know what type your child has. These include genetic tests and a biopsy of your child's muscles. There are three common types of muscular dystrophy. · Duchenne muscular dystrophy mostly affects boys starting at age 1 to 11. Boys with Duchenne MD may be unable to walk by age 15. They may also need a respirator to breathe. · Facioscapulohumeral MD affects boys and girls in their teen years. This type causes weakness in the face, arm, and leg muscles. This weakness may be mild or disabling. · Myotonic MD can appear at any age in boys or girls and usually develops slowly. It causes muscle spasms that make it hard to relax. The muscles also weaken and get smaller. It can be mild or severe. There are many treatments to help your child stay as active as possible. These include medicine, physical therapy, and devices to support the muscles. You can work with your doctor to make a treatment plan. Raising a child who has muscular dystrophy can be hard. It may help to join a support group or talk with other parents who have a child with special needs, so you don't feel alone. You may also want to try counseling. It could help you understand and deal with all the emotions you may feel. Follow-up care is a key part of your child's treatment and safety. Be sure to make and go to all appointments, and call your doctor if your child is having problems.  It's also a good idea to know your child's test results and keep a list of the medicines your child takes. How can you care for your child at home? · Learn how to do range-of-motion exercises with your child. These can help your child's joints stay flexible. They can also help keep the back straight. A physical therapist can help you set up a schedule and teach you how to do the exercises. · Talk to your doctor about special devices to help your child keep good posture and stay active. ? Braces can help the hands and lower legs stay straight and be flexible. Back supports or corsets help keep the back straight. ? Standing walkers will help your child stand. This is important to keep bones strong and the back straight. ? Wheelchairs help a child with weak legs get around and do activities. · Be safe with medicines. Have your child take medicines exactly as prescribed. Call your doctor if you think your child is having a problem with his or her medicine. You will get more details on the specific medicines your doctor prescribes. · If your child has pain from inflamed joints, talk to your doctor about over-the-counter pain medicine. If he or she recommends it, give acetaminophen (Tylenol) or ibuprofen (Advil, Motrin) for fever, pain, or fussiness. Read and follow all instructions on the label. · Do not give your child two or more pain medicines at the same time unless the doctor told you to. Many pain medicines have acetaminophen, which is Tylenol. Too much acetaminophen (Tylenol) can be harmful. · If it's hard for your child to close his or her eyes completely, try eye patches or sleep masks at night. · Gently massage your child's limbs and joints. This can help with pain and stiffness. Heat will help too. Put a warm, moist cloth on the sore area. Handling the challenges of muscular dystrophy  · Learn about the disease. This will help you know what you can do to help your child. Then you don't have to fear the unknown. · Focus on your child's strengths.  Let your child know that you love and believe in him or her. · Give your child some responsibility for his or her care. Children who have a say in their treatment often stay healthier. · Be aware of possible challenges. Children who have muscular dystrophy may have more social, emotional, and educational problems. · Consider joining a support group. If you share your experiences with parents who have challenges like yours, you may feel better. You may also want to try counseling. · Be realistic. Do the best you can, and know that you can't control everything. When should you call for help? Call 911 anytime you think your child may need emergency care. For example, call if:    · Your child has trouble breathing or swallowing. Call your doctor now or seek immediate medical care if:    · Your child has any vision problems.     · Your child seems to be getting weaker. Watch closely for changes in your child's health, and be sure to contact your doctor if:    · Your child strains when having a bowel movement and seems to be constipated.     · You want to learn more about muscular dystrophy. Where can you learn more? Go to http://www.gray.com/  Enter G255 in the search box to learn more about \"Muscular Dystrophy in Children: Care Instructions. \"  Current as of: December 13, 2021               Content Version: 13.2  © 2894-6546 [employer]wise, Incorporated. Care instructions adapted under license by [employer] (which disclaims liability or warranty for this information). If you have questions about a medical condition or this instruction, always ask your healthcare professional. [place] [address] any warranty or liability for your use of this information.

## 2022-04-22 NOTE — PROGRESS NOTES
1. \"Have you been to the ER, urgent care clinic since your last visit? Hospitalized since your last visit? \" No    2. \"Have you seen or consulted any other health care providers outside of the [address] [place] [address] since your last visit? \"  Yes, ENT in [address]     3. For patients aged 39-70: Has the patient had a colonoscopy / FIT/ Cologuard? NA - based on age      If the patient is female:    4. For patients aged 41-77: Has the patient had a mammogram within the past 2 years? NA - based on age or sex      11. For patients aged 21-65: Has the patient had a pap smear? NA - based on age or sex    Gastroenterology appt scheduled for May 2022.

## 2022-04-22 NOTE — PROGRESS NOTES
HISTORY OF PRESENT ILLNESS  Franky Ball is a 42 y.o. male. HPI: Here for follow-up. Accompanied by brother who started taking care of him after her father passed away. 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 Seen neurology. .  Their notes. Recommending neuromuscular specialist referral, pulmonary and cardiology referral.  I have done a referral to pulmonary and cardiology. Patient was advised to contact their office regarding further instructions on neuromuscular specialist referral as they would guidance where to send the referral.  He was also reminded to complete the blood work which was ordered by neurology. He has no follow-up appointment with neurologist which I have advised him to make 1. Ongoing worsening of the weakness. He is not able to ambulate without support. Cognitive impairment as well. Frequent fall. Risk of fall. Not able to use the cane or walker independently as well. Brother is also working and needed help with ADL. He wanted to discuss doing in-home therapy. At this time he was sent to the therapist by the neurologist and. Discussed that needed with the neurologist further. I have advised the brother to call back to the office with any question concerns regarding the referral.  Recently went to the ENT regarding the hearing impairment. Cerumen impaction. Mastoiditis. Will follow their recommendations. He has been receiving shower chair today. Chronic constipation. Symptomatic treatment. Reviewed labs. Noted elevated LFT. He has not done liver ultrasound yet. Provided contact information for central scheduling to schedule an ultrasound. He has an appointment with the gastroenterologist in [place]. Most of the conversation done with the brother. Patient not much verbal.  Was sitting comfortable without any acute distress. No behavioral concerns noted at this time.   Visit Vitals  BP 98/62 (BP 1 Location: Right upper arm, BP Patient Position: Sitting, BP Cuff Size: Adult)   Pulse 74   Temp 97.7 °F (36.5 °C) (Temporal)   Resp 14   Ht 5' 2\" (1.575 m)   Wt 105 lb (47.6 kg)   SpO2 98%   BMI 19.20 kg/m²     Review medication list, vitals, problem list,allergies. Lab Results   Component Value Date/Time    WBC 4.1 (L) 01/06/2022 03:06 PM    HGB 14.3 01/06/2022 03:06 PM    HCT 46.5 01/06/2022 03:06 PM    PLATELET 881 70/58/7201 03:06 PM    MCV 93.2 01/06/2022 03:06 PM     Lab Results   Component Value Date/Time    Sodium 141 01/06/2022 03:06 PM    Potassium 5.2 01/06/2022 03:06 PM    Chloride 107 01/06/2022 03:06 PM    CO2 33 (H) 01/06/2022 03:06 PM    Anion gap 1 (L) 01/06/2022 03:06 PM    Glucose 85 01/06/2022 03:06 PM    BUN 13 01/06/2022 03:06 PM    Creatinine 0.41 (L) 01/06/2022 03:06 PM    BUN/Creatinine ratio 32 (H) 01/06/2022 03:06 PM    GFR est AA >60 01/06/2022 03:06 PM    GFR est non-AA >60 01/06/2022 03:06 PM    Calcium 9.0 01/06/2022 03:06 PM    Bilirubin, total 1.3 (H) 01/06/2022 03:06 PM    Alk. phosphatase 119 (H) 01/06/2022 03:06 PM    Protein, total 6.1 (L) 01/06/2022 03:06 PM    Albumin 3.4 01/06/2022 03:06 PM    Globulin 2.7 01/06/2022 03:06 PM    A-G Ratio 1.3 01/06/2022 03:06 PM    ALT (SGPT) 99 (H) 01/06/2022 03:06 PM    AST (SGOT) 62 (H) 01/06/2022 03:06 PM     Lab Results   Component Value Date/Time    Cholesterol, total 152 01/06/2022 03:06 PM    HDL Cholesterol 84 (H) 01/06/2022 03:06 PM    LDL, calculated 53.8 01/06/2022 03:06 PM    VLDL, calculated 14.2 01/06/2022 03:06 PM    Triglyceride 71 01/06/2022 03:06 PM    CHOL/HDL Ratio 1.8 01/06/2022 03:06 PM       ROS: See HPI    Physical Exam  Constitutional:       General: He is not in acute distress. Cardiovascular:      Pulses: Normal pulses. Pulmonary:      Effort: Pulmonary effort is normal. No respiratory distress. Breath sounds: No wheezing. Abdominal:      Palpations: Abdomen is soft. Tenderness:  There is no abdominal tenderness. Musculoskeletal:         General: No swelling. Cervical back: Neck supple. Neurological:      General: No focal deficit present. Psychiatric:      Comments: Nonverbal.  Sitting without any acute distress. ASSESSMENT and PLAN    ICD-10-CM ICD-9-CM    1. Muscular dystrophy ([address]): Gradually progressing. Trouble ambulation, frequent falls. Risk of fall. Not ambulating or doing any ADLs without any support. He will have a follow-up appointment with neurology to discuss further as he has lots of questions regarding physical therapy etc.  Wanted in-home therapy evaluation which she will discuss further with the neurologist. G71.00 359.1 REFERRAL TO CARDIOLOGY      REFERRAL TO [address] [place] [address]    seen neurology and was referred to neuromuscular clinic. recommended cardiology and pulmonary referral    2. Dry eyes, bilateral  H04. 123 375.15 Systane, propylene glycoL, 0.4-0.3 % drop   3. Balance problem: Not able to use independently cane or walker. I have also done social work referral to provide him more resources regarding his personal aide care. R26.89 781.99 REFERRAL TO SOCIAL WORK   4. Elevated LFTs: He will complete liver ultrasound. Has coming up appointment with GI in May R79.89 790.6    5. Chronic constipation  K59.09 564.00    6. Hearing loss of left ear, unspecified hearing loss type: Seen ENT and following the recommendation: Symptomatic treatment and high-fiber diet H91.92 389.9    7. Decreased activities of daily living (ADL)  Z78.9 V49.89 REFERRAL TO SOCIAL WORK   Brother understood the plan well. He will contact neurology office and make a follow-up appointment. Follow-up and Dispositions    · Return in about 4 months (around 8/22/2022). Please note that this dictation was completed with [employer], the [employer] voice recognition software.   Quite often unanticipated grammatical, syntax, homophones, and other interpretive errors are inadvertently transcribed by the computer software. Please disregard these errors. Please excuse any errors that have escaped final proofreading.

## 2022-05-13 ENCOUNTER — OFFICE VISIT (OUTPATIENT)
Dept: PULMONOLOGY | Age: 47
End: 2022-05-13
Payer: COMMERCIAL

## 2022-05-13 VITALS
OXYGEN SATURATION: 99 % | DIASTOLIC BLOOD PRESSURE: 69 MMHG | HEART RATE: 73 BPM | WEIGHT: 106.2 LBS | SYSTOLIC BLOOD PRESSURE: 100 MMHG | RESPIRATION RATE: 18 BRPM | BODY MASS INDEX: 19.54 KG/M2 | HEIGHT: 62 IN | TEMPERATURE: 97.2 F

## 2022-05-13 DIAGNOSIS — G71.11 MYOTONIC DYSTROPHY, TYPE 1 (HCC): Primary | ICD-10-CM

## 2022-05-13 DIAGNOSIS — R53.1 ASTHENIA: ICD-10-CM

## 2022-05-13 DIAGNOSIS — R13.10 DYSPHAGIA, UNSPECIFIED TYPE: ICD-10-CM

## 2022-05-13 PROCEDURE — 99204 OFFICE O/P NEW MOD 45 MIN: CPT | Performed by: INTERNAL MEDICINE

## 2022-05-13 NOTE — PROGRESS NOTES
HISTORY OF PRESENT ILLNESS  Ayala Combs is a 42 y.o. male referred for muscular dystrophy. Pt was diagnosed with MD type 1 at age 17 when he presented with progressive muscle weakness. Pt moved from [address] [place] [address] ~6 months ago, now lives with his brother who is his primary caregiver. Pt has been noted to cough after eating of drinking. Pt denies chest pain, fever or purulent phlegm no ED visits or hospital admissions since moving to [place]. Occasional weakness of knees with buckling has been noted by the brother. No note of loud snoring, gasping or daytime somnolence. Review of Systems   Constitutional: Positive for weight loss. Negative for chills, diaphoresis, fever and malaise/fatigue. HENT: Negative for congestion, ear discharge, ear pain, hearing loss, nosebleeds, sinus pain, sore throat and tinnitus. Eyes: Negative for blurred vision, double vision, photophobia, pain, discharge and redness. Respiratory: Positive for cough. Negative for hemoptysis, sputum production, shortness of breath, wheezing and stridor. Cardiovascular: Negative for chest pain, palpitations, orthopnea, claudication, leg swelling and PND. Gastrointestinal: Positive for abdominal pain and constipation. Negative for blood in stool, diarrhea, heartburn, melena, nausea and vomiting. Genitourinary: Negative for dysuria, flank pain, frequency, hematuria and urgency. Musculoskeletal: Negative for back pain, falls, joint pain, myalgias and neck pain. Skin: Negative for itching and rash. Neurological: Positive for weakness. Negative for dizziness, tingling, tremors, sensory change, speech change, seizures, loss of consciousness and headaches. Endo/Heme/Allergies: Negative for environmental allergies and polydipsia. Does not bruise/bleed easily. Psychiatric/Behavioral: Negative for depression, hallucinations, substance abuse and suicidal ideas. The patient is not nervous/anxious and does not have insomnia.       Past Medical History:   Diagnosis Date    Cholesteatoma     [facility]cular dystrophy ([facility]      History reviewed. No pertinent surgical history. Current Outpatient Medications on File Prior to Visit   Medication Sig Dispense Refill    Systane, propylene glycoL, 0.4-0.3 % drop Administer 2 Drops to both eyes four (4) times daily. 30 mL 1    Shower Chair XX silvio Need during shower 1 Each 0     No current facility-administered medications on file prior to visit. Allergies   Allergen Reactions    Seafood Other (comments)     Patient indicated Seafood/Shellfish allergy- \"Swelling of throat and Hives\"      Family History   Problem Relation Age of Onset    Muscular dystrophy Mother     Diabetes Paternal Grandfather     Lung Cancer Maternal Grandmother     Muscular dystrophy Cousin     Muscular dystrophy Cousin      Social History     Socioeconomic History    Marital status: SINGLE     Spouse name: Not on file    Number of children: Not on file    Years of education: Not on file    Highest education level: Not on file   Occupational History    Not on file   Tobacco Use    Smoking status: Never Smoker    Smokeless tobacco: Never Used   Vaping Use    Vaping Use: Never used   Substance and Sexual Activity    Alcohol use: Never    Drug use: Never    Sexual activity: Never   Other Topics Concern    Not on file   Social History Narrative    Not on file     Social Determinants of Health     Financial Resource Strain:     Difficulty of Paying Living Expenses: Not on file   Food Insecurity:     Worried About [address] in the Last Year: Not on file    [address] in the Last Year: Not on file   Transportation Needs:     Lack of Transportation (Medical): Not on file    Lack of Transportation (Non-Medical):  Not on file   Physical Activity:     Days of Exercise per Week: Not on file    Minutes of Exercise per Session: Not on file   Stress:     Feeling of Stress : Not on file   Social Connections:     Frequency of Communication with Friends and Family: Not on file    Frequency of Social Gatherings with Friends and Family: Not on file    Attends Anabaptism Services: Not on file    Active Member of Clubs or Organizations: Not on file    Attends Club or Organization Meetings: Not on file    Marital Status: Not on file   Intimate Partner Violence:     Fear of Current or Ex-Partner: Not on file    Emotionally Abused: Not on file    Physically Abused: Not on file    Sexually Abused: Not on file   Housing Stability:     Unable to Pay for Housing in the Last Year: Not on file    Number of [place] in the Last Year: Not on file    Unstable Housing in the Last Year: Not on file     Blood pressure 100/69, pulse 73, temperature 97.2 °F (36.2 °C), temperature source Temporal, resp. rate 18, height 5' 2\" (1.575 m), weight 48.2 kg (106 lb 3.2 oz), SpO2 99 %. Physical Exam  Constitutional:       General: He is not in acute distress. Appearance: He is not ill-appearing, toxic-appearing or diaphoretic. Comments: Asthenic    HENT:      Head: Normocephalic and atraumatic. Right Ear: External ear normal.      Left Ear: External ear normal.      Nose: Nose normal.      Mouth/Throat:      Mouth: Mucous membranes are dry. Pharynx: No oropharyngeal exudate or posterior oropharyngeal erythema. Comments: Poor dentition  Eyes:      General: No scleral icterus. Right eye: No discharge. Left eye: No discharge. Extraocular Movements: Extraocular movements intact. Conjunctiva/sclera: Conjunctivae normal.      Pupils: Pupils are equal, round, and reactive to light. Neck:      Vascular: No carotid bruit. Cardiovascular:      Rate and Rhythm: Normal rate and regular rhythm. Pulses: Normal pulses. Heart sounds: Normal heart sounds. No murmur heard. No gallop. Pulmonary:      Effort: Pulmonary effort is normal. No respiratory distress.       Breath sounds: Normal breath sounds. No stridor. No wheezing, rhonchi or rales. Chest:      Chest wall: No tenderness. Abdominal:      General: Abdomen is flat. There is no distension. Palpations: Abdomen is soft. There is no mass. Tenderness: There is no abdominal tenderness. Musculoskeletal:         General: No swelling, tenderness or signs of injury. Cervical back: No rigidity or tenderness. Right lower leg: No edema. Left lower leg: No edema. Lymphadenopathy:      Cervical: No cervical adenopathy. Skin:     General: Skin is warm and dry. Coloration: Skin is not jaundiced or pale. Findings: No bruising, erythema, lesion or rash. Neurological:      Mental Status: He is alert and oriented to person, place, and time. Coordination: Coordination normal.      Comments: Speech is nasal but not slurred. Psychiatric:         Mood and Affect: Mood normal.         Behavior: Behavior normal.         Thought Content: Thought content normal.         Judgment: Judgment normal.       Lab Results   Component Value Date/Time    WBC 4.1 (L) 01/06/2022 03:06 PM    HGB 14.3 01/06/2022 03:06 PM    HCT 46.5 01/06/2022 03:06 PM    PLATELET 183 78/30/2221 03:06 PM    MCV 93.2 01/06/2022 03:06 PM     Lab Results   Component Value Date/Time    Sodium 141 01/06/2022 03:06 PM    Potassium 5.2 01/06/2022 03:06 PM    Chloride 107 01/06/2022 03:06 PM    CO2 33 (H) 01/06/2022 03:06 PM    Anion gap 1 (L) 01/06/2022 03:06 PM    Glucose 85 01/06/2022 03:06 PM    BUN 13 01/06/2022 03:06 PM    Creatinine 0.41 (L) 01/06/2022 03:06 PM    BUN/Creatinine ratio 32 (H) 01/06/2022 03:06 PM    GFR est AA >60 01/06/2022 03:06 PM    GFR est non-AA >60 01/06/2022 03:06 PM    Calcium 9.0 01/06/2022 03:06 PM       ASSESSMENT and PLAN  Encounter Diagnoses   Name Primary?     [facility] Yes    Dysphagia, unspecified type     Asthenia      30 year history of MD 1, with SSx of progressive muscle weakness. Dysphagia is likely given post prandial cough and asthenia. Pt is also at risk for hypoventilation and atelectasis but denies symptoms suggestive of sleep disordered breathing. Schedule PFT's to R/o restriction and respiratory muscle weakness. Modified Barium swallow for possible dysphagia. Check thyroid profile. Start incentive spirometry, DME ordered, instructions given. Encouraged up to date vaccinations broderick Pneumovax and flu vaccination. Discussed plan with pt and brother. RTC after above tests as further intervention will depend on results.

## 2022-05-13 NOTE — PROGRESS NOTES
Annika Enriquez presents today for   Chief Complaint   Patient presents with    New Patient       Is someone accompanying this pt? yes    Is the patient using any DME equipment during [address]? Wheelchair, shower chair    -[address], [address]    Depression Screening:  3 most recent [address] 5/13/2022   Little interest or pleasure in doing things Not at all   Feeling down, depressed, irritable, or hopeless Not at all   Total Score PHQ 2 0       Learning Assessment:  Learning Assessment 12/2/2021   PRIMARY LEARNER Patient   HIGHEST LEVEL OF EDUCATION - PRIMARY LEARNER  GRADUATED HIGH SCHOOL OR GED   BARRIERS PRIMARY LEARNER [address] CAREGIVER Yes   CO-LEARNER NAME Vesta Rolle (brother)   [address] [facility]   PRIMARY LANGUAGE ENGLISH   PRIMARY LANGUAGE CO-LEARNER ENGLISH    NEED No   LEARNER PREFERENCE PRIMARY DEMONSTRATION     LISTENING     READING     VIDEOS     OTHER (COMMENT)   LEARNER PREFERENCE CO-LEARNER READING   LEARNING SPECIAL TOPICS No   ANSWERED BY Ace Hicks (brother)   RELATIONSHIP SELF       Abuse Screening:  Abuse Screening Questionnaire 12/2/2021   Do you ever feel afraid of your partner? N   Are you in a relationship with someone who physically or mentally threatens you? N   Is it safe for you to go home? Y       Fall Risk  No flowsheet data found. Coordination of Care:  1. Have you been to the ER, urgent care clinic since your last visit? Hospitalized since your last visit? no    2. Have you seen or consulted any other health care providers outside of the [address] [place] [address] since your last visit? Include any pap smears or colon screening.  no

## 2022-05-13 NOTE — LETTER
5/13/2022    Patient: Sandy Monday   YOB: 1975   Date of Visit: 5/13/2022     Martin Russo MD  [address] [place] [address]  [facility]    Dear Martin Russo MD,      Thank you for referring Mr. Delgado Monday to [address] [place] [address] for evaluation. My notes for this consultation are attached. If you have questions, please do not hesitate to call me. I look forward to following your patient along with you.       Sincerely,    Ольга Mehta MD

## 2022-05-20 ENCOUNTER — HOSPITAL ENCOUNTER (OUTPATIENT)
Dept: PHYSICAL THERAPY | Age: 47
End: 2022-05-20
Payer: COMMERCIAL

## 2022-05-20 ENCOUNTER — HOSPITAL ENCOUNTER (OUTPATIENT)
Dept: PHYSICAL THERAPY | Age: 47
Discharge: HOME | End: 2022-05-20
Payer: COMMERCIAL

## 2022-05-20 PROCEDURE — 97110 THERAPEUTIC EXERCISES: CPT

## 2022-05-20 PROCEDURE — 97112 NEUROMUSCULAR REEDUCATION: CPT

## 2022-05-20 PROCEDURE — 97530 THERAPEUTIC ACTIVITIES: CPT

## 2022-05-20 NOTE — PROGRESS NOTES
[facility] Elias Obrien  [address] [facility]  [phone] fax    Physical Therapy Progress Note  Patient name: Louann Sewell Start of Care: 2022   Referral source: Ninoska Rodgers MD : 1975                Medical Diagnosis: Muscle weakness [M62.81]  Payor: [address] [place] [address] Road / Plan: [address] / Product Type: Managed Care Medicaid /  Onset Date: 2022                Treatment Diagnosis: Impaired gait/balance; Muscle weakness    Prior Hospitalization: see medical history Provider#: [id number]   Medications: Verified on Patient summary List    Comorbidities:Neurological disease, Myotonic Dystrophy Type 1 Back pain, GI disease, Hearing impairment, other disorders, Visual impairment-2019- cataracts surgery   Prior Level of Function: Patient lives with brother and has a home attendant with him to assist as needed.             Visits from Start of Care: 2    Missed Visits: 0    Established Goals:         Excellent           Good         Limited           None  [x] Increased ROM   []  []  [x]  []  [x] Increased Strength  []  []  [x]  []  [x] Increased Mobility  []  []  [x]  []   [x] Decreased Pain   []  []  [x]  []  [] Decreased Swelling  []  []  []  []    Key Functional Changes:  Pt. Progressing slowly towards goals. He had a delay since [facility] but returned to PT today. He reports compliance with his HEP. He does demonstrate improving sit to stand transfers today with ability to perform with no UE support and SBA. He ambulates with occasional scissoring and required min A at times to prevent falling. FOTO score had no significant change at 48 points. Skilled PT is medically necessary in order to improve balance and safety awareness during ambulation in order to decrease falls and improve quality of life. Updated Goals: to be achieved in 4 weeks:  1.  Patient will improve FOTO score by 8 points in order to demonstrate a significant improvement in function. 2. Patient will be able to perform 1 step up onto 6inch step without UE support to increase safety with negotiating curb to get into home. 3. Patient will be able to ambulate 100 feet with SBA without instances of instability to increase safety with home navigation. 4. Patient will perform Romberg balance on foam with eyes closed without LOB for 30 seconds in order to demonstrate improve safety at home. ASSESSMENT/RECOMMENDATIONS:  [x]Continue therapy per initial plan/protocol at a frequency of  2 x per week for 4 weeks  []Continue therapy with the following recommended changes:_____________________      _____________________________________________________________________  []Discontinue therapy progressing towards or have reached established goals  []Discontinue therapy due to lack of appreciable progress towards goals  []Discontinue therapy due to lack of attendance or compliance  []Await Physician's recommendations/decisions regarding therapy  []Other:________________________________________________________________    Thank you for this referral.   Pura Mcmanus, PT 5/20/2022 4:00 PM    NOTE TO PHYSICIAN:  PLEASE COMPLETE THE ORDERS BELOW AND   FAX TO [facility]: [phone]  If you are unable to process this request in 24 hours please contact our office: [phone] I have read the above report and request that my patient continue as recommended. ? I have read the above report and request that my patient continue therapy with the following changes/special instructions:____________________________________  ? I have read the above report and request that my patient be discharged from therapy.     Physicians signature: ______________________________Date: ______Time:______     Alexander Martin MD

## 2022-05-20 NOTE — PROGRESS NOTES
PT DAILY TREATMENT NOTE     Patient Name: Brent Yuen  Date:2022  : 1975  [x]  Patient  Verified  Payor: [address] / Plan: [address] / Product Type: Managed Care Medicaid /    In time: 2:15  Out time: 3:08  Total Treatment Time (min): 48  Visit #: 2 of 8    Treatment Area: Generalized weakness [R53.1]    SUBJECTIVE  Pain Level (0-10 scale):  0/10  Any medication changes, allergies to medications, adverse drug reactions, diagnosis change, or new procedure performed?: [x] No    [] Yes (see summary sheet for update)  Subjective functional status/changes:   [] No changes reported  Pt. Reports he is feeling about the same today. He did have a recent fall trying to step into shower. OBJECTIVE    23 min Therapeutic Exercise:  [x] See flow sheet :   Rationale: increase ROM and increase strength to improve the patients ability to increase ease of ambulation     15 min Therapeutic Activity:  []  See flow sheet : step ups, goal assessments    Rationale: increase strength and improve coordination  to improve the patients ability to increase ease of ADLs     15 min Neuromuscular Re-education:  []  See flow sheet : standing balance activities, backwards stepping, side stepping   Rationale: improve coordination and improve balance  to improve the patients ability to increase ease of ambulation           With   [x] TE   [] TA   [] neuro   [] other: Patient Education: [x] Review HEP    [] Progressed/Changed HEP based on:   [] positioning   [] body mechanics   [] transfers   [] heat/ice application    [] other:      Other Objective/Functional Measures: FOTO:  48 points  Sit to stand transfers: able to perform with SBA  Ambulation: CGA with occasional min A for stability.  Occasional scissoring gait  6 inch step up required B UE support       Pain Level (0-10 scale) post treatment:  0/10    ASSESSMENT/Changes in Function:      []  See Plan of Care  [x]  See progress note/recertification  []  See Discharge Summary         Progress towards goals / Updated goals:  See progress note    PLAN  []  Upgrade activities as tolerated     [x]  Continue plan of care  []  Update interventions per flow sheet       []  Discharge due to:_  []  Other:_      Jean-Paul Isbell, PT 5/20/2022  2:11 PM    Future Appointments   Date Time Provider Luke Brook   5/20/2022  2:15 PM Mai Talavera, PT [facility]   5/26/2022  3:20 PM Cheyenne Hood DO [facility] BS AMB   5/27/2022 12:45 PM Iveth Regan, OT [facility]   6/3/2022 12:45 PM Naveed Ramos, PTA [facility]   6/3/2022  1:30 PM Herb Magdaleno [id number] [facility]   6/10/2022 11:15 AM Herb Magdaleno [id number] [facility]   6/10/2022 12:00 PM Mai Talavera, PT [facility]   8/26/2022 11:15 AM Néstor Au MD [facility] BS AMB

## 2022-05-26 ENCOUNTER — OFFICE VISIT (OUTPATIENT)
Dept: CARDIOLOGY CLINIC | Age: 47
End: 2022-05-26
Payer: COMMERCIAL

## 2022-05-26 VITALS
SYSTOLIC BLOOD PRESSURE: 98 MMHG | DIASTOLIC BLOOD PRESSURE: 64 MMHG | WEIGHT: 105 LBS | HEART RATE: 74 BPM | HEIGHT: 62 IN | BODY MASS INDEX: 19.32 KG/M2 | OXYGEN SATURATION: 97 %

## 2022-05-26 DIAGNOSIS — Z01.818 PRE-OP TESTING: Primary | ICD-10-CM

## 2022-05-26 DIAGNOSIS — R53.1 WEAKNESS GENERALIZED: ICD-10-CM

## 2022-05-26 DIAGNOSIS — G71.00 MUSCULAR DYSTROPHY (HCC): ICD-10-CM

## 2022-05-26 PROCEDURE — 99204 OFFICE O/P NEW MOD 45 MIN: CPT | Performed by: INTERNAL MEDICINE

## 2022-05-26 NOTE — PROGRESS NOTES
[facility]    Chief Complaint   Patient presents with    New Patient     Patient referred by Dr Brian Blake for Muscular Dystrophy       HPI    [facility] is a 42 y. o. with muscular dystrophy here for perioperative risk stratification prior to endoscopy and possible ear surgery. Pt's brother is his new caregiver since their father passed away, he gives [de-identified] of history. Says his brother cant walk safely totally independently, so he holds him up to walk. He has no CP, no swelling, no palpitations, has never been observed passing out. They have concern he has had no CV eval and can he undergo anesthesia. Past Medical History:   Diagnosis Date    Cholesteatoma     Muscular dystrophy ([address]        No past surgical history on file. Current Outpatient Medications   Medication Sig Dispense Refill    Systane, propylene glycoL, 0.4-0.3 % drop Administer 2 Drops to both eyes four (4) times daily.  30 mL 1    Shower Chair XX silvio Need during shower (Patient not taking: Reported on 5/26/2022) 1 Each 0       Allergies   Allergen Reactions    Seafood Other (comments)     Patient indicated Seafood/Shellfish allergy- \"Swelling of throat and Hives\"        Social History     Socioeconomic History    Marital status: SINGLE     Spouse name: Not on file    Number of children: Not on file    Years of education: Not on file    Highest education level: Not on file   Occupational History    Not on file   Tobacco Use    Smoking status: Never Smoker    Smokeless tobacco: Never Used   Vaping Use    Vaping Use: Never used   Substance and Sexual Activity    Alcohol use: Never    Drug use: Never    Sexual activity: Never   Other Topics Concern    Not on file   Social History Narrative    Not on file     Social Determinants of Health     Financial Resource Strain:     Difficulty of Paying Living Expenses: Not on file   Food Insecurity:     Worried About [address] in the Last Year: Not on file    Ran Out of Food in the Last Year: Not on file   Transportation Needs:     Lack of Transportation (Medical): Not on file    Lack of Transportation (Non-Medical): Not on file   Physical Activity:     Days of Exercise per Week: Not on file    Minutes of Exercise per Session: Not on file   Stress:     Feeling of Stress : Not on file   Social Connections:     Frequency of Communication with Friends and Family: Not on file    Frequency of Social Gatherings with Friends and Family: Not on file    Attends [employer] Services: Not on file    Active Member of [address] [place] [address] [employer] or Organizations: Not on file    Attends Club or Organization Meetings: Not on file    Marital Status: Not on file   Intimate Partner Violence:     Fear of Current or Ex-Partner: Not on file    Emotionally Abused: Not on file    Physically Abused: Not on file    Sexually Abused: Not on file   Housing Stability:     Unable to Pay for Housing in the Last Year: Not on file    Number of [place] in the Last Year: Not on file    Unstable Housing in the Last Year: Not on file    from [place]    FH: neg premature ASCVD, no SCD, brother has thyroid disease and palpitations    Review of Systems    14 pt Review of Systems is negative unless otherwise mentioned in the HPI.     Wt Readings from Last 3 Encounters:   05/26/22 47.6 kg (105 lb)   05/13/22 48.2 kg (106 lb 3.2 oz)   04/22/22 47.6 kg (105 lb)     Temp Readings from Last 3 Encounters:   05/13/22 97.2 °F (36.2 °C) (Temporal)   04/22/22 97.7 °F (36.5 °C) (Temporal)   02/25/22 97.4 °F (36.3 °C) (Temporal)     BP Readings from Last 3 Encounters:   05/26/22 98/64   05/13/22 100/69   04/22/22 98/62     Pulse Readings from Last 3 Encounters:   05/26/22 74   05/13/22 73   04/22/22 74     Physical Exam:    Visit Vitals  BP 98/64 (BP 1 Location: Left upper arm, BP Patient Position: Sitting, BP Cuff Size: Adult)   Pulse 74   Ht 5' 2\" (1.575 m)   Wt 47.6 kg (105 lb)   SpO2 97%   BMI 19.20 kg/m²      Physical Exam  HENT:      Head: Normocephalic and atraumatic. Eyes:      General: No scleral icterus. Pupils: Pupils are equal, round, and reactive to light. Cardiovascular:      Rate and Rhythm: Normal rate and regular rhythm. Heart sounds: Normal heart sounds. No murmur heard. No friction rub. No gallop. Pulmonary:      Effort: Pulmonary effort is normal. No respiratory distress. Breath sounds: Normal breath sounds. No wheezing or rales. Chest:      Chest wall: No tenderness. Abdominal:      General: Bowel sounds are normal.      Palpations: Abdomen is soft. Skin:     General: Skin is warm and dry. Findings: No rash. Neurological:      Mental Status: He is alert and oriented to person, place, and time. Impression and Plan:  Gwyn Cruz is a 42 y. o. with:    Preop risk  Muscular dystrophy    Though I dont know details of his MD- he doesn't seem to have Duchennes or Mary Lou Astudillo. I dont suspect related cardiomyopathy but really need it to due his limited functional capacity in order to safely risk stratify him for possible upcoming procedures. If his echo is benign no further CV eval or follow up planned. Above dw pt and his brother    Thank you for allowing me to participate in the care of your patient, please do not hesitate to call with questions or concerns.     [address],    Bailey Ferris, DO

## 2022-05-27 ENCOUNTER — HOSPITAL ENCOUNTER (OUTPATIENT)
Dept: PHYSICAL THERAPY | Age: 47
Discharge: HOME | End: 2022-05-27
Payer: COMMERCIAL

## 2022-05-27 ENCOUNTER — APPOINTMENT (OUTPATIENT)
Dept: PHYSICAL THERAPY | Age: 47
End: 2022-05-27
Payer: COMMERCIAL

## 2022-05-27 PROCEDURE — 97530 THERAPEUTIC ACTIVITIES: CPT

## 2022-05-27 PROCEDURE — 97535 SELF CARE MNGMENT TRAINING: CPT

## 2022-05-27 PROCEDURE — 97110 THERAPEUTIC EXERCISES: CPT

## 2022-05-27 NOTE — PROGRESS NOTES
OT DAILY TREATMENT NOTE     Patient Name: Shay Downing  Date:2022  : 1975  [x]  Patient  Verified  Payor: [address] [place] [address] Road / Plan: [address] / Product Type: Managed Care Medicaid /    In time:12:45 PM  Out time:1:30 PM  Total Treatment Time (min): 45  Visit #: 2 of 8    Treatment Area: Muscle weakness [M62.81]    SUBJECTIVE  Pain Level (0-10 scale): 0/10  Any medication changes, allergies to medications, adverse drug reactions, diagnosis change, or new procedure performed?: [x] No    [] Yes (see summary sheet for update)  Subjective functional status/changes:   [] No changes reported  Per Brother, Lalita Isaiah, \"We haven't been able to get in here because we have been waiting for authorization from the insurance. \"    OBJECTIVE     15 min Therapeutic Exercise:  [x] See flow sheet :   Rationale: increase ROM and increase strength to improve the patients ability to grasp, , pinch using woody hands    15 min Therapeutic Activity:  [x]  See flow sheet :   Rationale: increase ROM and improve coordination  to improve the patients ability to manipulate items using woody hands       15 min Self Care/Home Management: AE use, DME use, caregiver instruction   Rationale: education  to improve the patients ability to perform ADL tasks with increased independence and safety    With   [] TE   [] TA   [] neuro   [] other: Patient Education: [x] Review HEP    [] Progressed/Changed HEP based on:   [] positioning   [] body mechanics   [] transfers   [] heat/ice application   [] Splint wear/care   [] Sensory re-education   [] scar management      [] other:            Other Objective/Functional Measures:   Range of Motion:WFL  Strength:      Right Left   Shoulder Flex 3+ 3+               abd 3+ 3+                                 Elbow Ext/flex 3/4 3/4   Forearm Supination 4 4     Pronation 3 3   Wrist Flex 3+ 3+     Ext 4 4                          Hand ROM WNL  Hand Strength:    Gross Grasp 3pt Pinch Lateral Pinch Tip Pinch   Right  10 4 4 6   Left 10 4 4 4      Nine-Hole Peg Test:  Left= __26___seconds                        Right=_25____seconds       Pain Level (0-10 scale) post treatment: 0/10    ASSESSMENT/Changes in Function: Pt has not been seen since initial evaluation on 4/12/2022 due to awaiting insurance authorization. Pt was not provided with initial HEP at that time. Pt's brother, Vincenzo Betancur, reports they received a shower chair last week that they have begun using, however his brother, Sacha More, continues to be limited in ability to perform UB dressing and bathing tasks without assistance. Pt and caregiver were provided and instructed in use of soft therapy putty for  and pinch strengthening of woody hands. Pt completed putty activities during this treatment session with minimal assistance for printed HEP secondary to impaired vision. Pt was provided picture exercises as well. Pt is scheduled regularly to attend on Fridays for the next month. Patient will continue to benefit from skilled OT services to modify and progress therapeutic interventions, address ROM deficits, address strength deficits and instruct in home and community integration to attain remaining goals. []  See Plan of Care  [x]  See progress note/recertification  []  See Discharge Summary         Progress towards goals / Updated goals:  Short Term Goals: To be accomplished in 2 weeks:  1. Caregiver to be familiar with adaptive equipment to ease bathing. Status at PN 5/27/2022 - will continue to address due to limited visits    2. Patient will be independent in home exercise program for UE strengthening within limits of diagnosis. Status at PN 5/27/2022- pt and caregiver provided and instructed in  and pinch strengthening HEP to be completed with soft therapy putty, pt completed activities with putty during this treatment session, will continue to address due to limited visits     Long Term Goals:  To be accomplished in 4 weeks:          1. Patient will be minimal assist for bathing for safety. 5/27/2022 - pt has shower chair, bath mat in tub  - requires assistance to step into and out of tub. Therapist suggested tub bench in future if necessary - will continue to address due to limited visits  2. Patient will be able to participate in desired activities incorporating goals of improving strength. Status at PN 5/27/2022 - pt enjoys walking outside, watching TV - will continue to address due to limited visits  3. Patient will improve self care independence as shown by increase in FOTO of at least 10 points.    Status at PN 5/27/2022 - NT, will continue to address due to limited visits     PLAN  []  Upgrade activities as tolerated     [x]  Continue plan of care  []  Update interventions per flow sheet       []  Discharge due to:_  []  Other:_      Vicky Kearns OT 5/27/2022  12:42 PM    Future Appointments   Date Time Provider Luke Doe   5/27/2022 12:45 PM Vilma Alaniz OT [facility]   6/3/2022 12:45 PM Dennys Bravo, PTA [facility]   6/3/2022  1:30 PM Nonda Farfan ANHIBZK [facility]   6/10/2022 11:15 AM Nonda Farfan WUCHDGH [facility]   6/10/2022 12:00 PM Lola Fowler, PT [facility]   7/22/2022 12:30 PM [facility] BS AMB   8/26/2022 11:15 AM Jimbo Bailey MD [facility] BS AMB

## 2022-06-03 ENCOUNTER — HOSPITAL ENCOUNTER (OUTPATIENT)
Dept: PHYSICAL THERAPY | Age: 47
Discharge: HOME | End: 2022-06-03
Payer: COMMERCIAL

## 2022-06-03 PROCEDURE — 97535 SELF CARE MNGMENT TRAINING: CPT

## 2022-06-03 PROCEDURE — 97110 THERAPEUTIC EXERCISES: CPT

## 2022-06-03 PROCEDURE — 97530 THERAPEUTIC ACTIVITIES: CPT

## 2022-06-03 PROCEDURE — 97112 NEUROMUSCULAR REEDUCATION: CPT

## 2022-06-03 NOTE — PROGRESS NOTES
OT DAILY TREATMENT NOTE 10-18    Patient Name: Erlinda Diaz  Date:6/3/2022  : 1975  [x]  Patient  Verified  Payor: [address] / Plan: [address] / Product Type: Managed Care Medicaid /    In time:130  Out time:217  Total Treatment Time (min): 52  Visit #: 1 of 8    Treatment Area: Muscle weakness [M62.81]    SUBJECTIVE  Pain Level (0-10 scale): 0/10  Any medication changes, allergies to medications, adverse drug reactions, diagnosis change, or new procedure performed?: [x] No    [] Yes (see summary sheet for update)  Subjective functional status/changes:   [] No changes reported  I want to be active     OBJECTIVE    17 min Therapeutic Exercise:  [] See flow sheet :   Rationale: increase ROM and increase strength to improve the patients ability to , reach     Soft theraputty: Manipulated with B Hands to reveal/remove 1/4 in peg 10/10  Soft Theraputty: Food   Therabar: 3 ways: 10x each with hold     15 min Therapeutic Activity:  []  See flow sheet :   Rationale: increase ROM and improve coordination  to improve the patients ability to build endourace    Basketball bounces- seated- alternating hands   Basketball Toss: seated     15 min Self Care/Home Management: Dycem   Rationale: increase ROM, increase strength and improve coordination  to improve the patients ability to perform self and home care tasks     Education on adaptive strategies   Administered Dycem with example of uses  Pt able to open various jar with use of dycem       With   [] TE   [] TA   [] neuro   [] other: Patient Education: [x] Review HEP    [] Progressed/Changed HEP based on:   [] positioning   [] body mechanics   [] transfers   [] heat/ice application   [] Splint wear/care   [] Sensory re-education   [] scar management      [] other:            Other Objective/Functional Measures:     Administered blue putty to add into yellow slowly for increased resistance  Administered beads for putty       Pain Level (0-10 scale) post treatment: 0/10    ASSESSMENT/Changes in Function: good carryover with HEP, good response with active tasks     Patient will continue to benefit from skilled OT services to modify and progress therapeutic interventions, address ROM deficits, address strength deficits, analyze and cue movement patterns and instruct in home and community integration to attain remaining goals. []  See Plan of Care  []  See progress note/recertification  []  See Discharge Summary         Progress towards goals / Updated goals:  1. Caregiver to be familiar with adaptive equipment to ease bathing. Status at PN 5/27/2022 - will continue to address due to limited visits     2. Patient will be independent in home exercise program for UE strengthening within limits of diagnosis. Status at PN 5/27/2022- pt and caregiver provided and instructed in  and pinch strengthening HEP to be completed with soft therapy putty, pt completed activities with putty during this treatment session, will continue to address due to limited visits            1.  Patient will be minimal assist for bathing for safety. 5/27/2022 - pt has shower chair, bath mat in tub  - requires assistance to step into and out of tub. Therapist suggested tub bench in future if necessary - will continue to address due to limited visits     2.  Patient will be able to participate in desired activities incorporating goals of improving strength.   Status at PN 5/27/2022 - pt enjoys walking outside, watching TV - will continue to address due to limited visits     3.  Patient will improve self care independence as shown by increase in FOTO of at least 10 points.   Status at PN 5/27/2022 - NT, will continue to address due to limited visits     PLAN  []  Upgrade activities as tolerated     [x]  Continue plan of care  []  Update interventions per flow sheet       []  Discharge due to:_  []  Other:_      JANAE Upton/GREGG 6/3/2022  11:17 AM        Future Appointments   Date Time Provider Luke Doe   6/3/2022 12:45 PM Wendy Saldana PTA [facility]   6/3/2022  1:30 PM Armando Vega LWHCZKN [facility]   6/10/2022 11:15 AM Armando [id number] [facility]   6/10/2022 12:00 PM Maritza Escobar PT [facility]   7/22/2022 12:30 PM [facility] BS AMB   8/26/2022 11:15 AM Daphne Galaviz MD hospitals BS AMB

## 2022-06-03 NOTE — PROGRESS NOTES
PT DAILY TREATMENT NOTE     Patient Name: Elvira Cain  Date:6/3/2022  : 1975  [x]  Patient  Verified  Payor: [address] / Plan: [address] / Product Type: Managed Care Medicaid /    In time: 12:45  Out time: 1:30  Total Treatment Time (min): 45   Visit #: 1 of 8    Treatment Area: Generalized weakness [R53.1]    SUBJECTIVE  Pain Level (0-10 scale):  0/10  Any medication changes, allergies to medications, adverse drug reactions, diagnosis change, or new procedure performed?: [x] No    [] Yes (see summary sheet for update)  Subjective functional status/changes:   [] No changes reported  Pt reports no current pain but does have instances of left abdominal pain that comes and goes which he has had looked at by GI doctors with no answers. His brother states instances of losing balance on the step up to get in the home. They have been doing exercises at home but find it hard to clear the foot for kicking without trying to bring the leg around.        OBJECTIVE    20 min Therapeutic Exercise:  [x] See flow sheet :   Rationale: increase ROM and increase strength to improve the patients ability to increase ease of ambulation     15 min Therapeutic Activity:  [x]  See flow sheet :    Rationale: increase strength and improve coordination  to improve the patients ability to increase ease of ADLs     10 min Neuromuscular Re-education:  [x]  See flow sheet :    Rationale: improve coordination and improve balance  to improve the patients ability to increase ease of ambulation           With   [x] TE   [] TA   [] neuro   [] other: Patient Education: [x] Review HEP    [] Progressed/Changed HEP based on:   [] positioning   [] body mechanics   [] transfers   [] heat/ice application    [] other:      Other Objective/Functional Measures:   Reviewed unlocking knees for standing exercises to reduce genu recurvatum  Provided comprehensive HEP for home strengthening  Educated to let us know how he responds to home program so we can update as needed  No evidence of imbalance during today's session  Able to perform 6\" step ups with //  Reviewed using up with the good, down with the bad for curbs for safety (pt believes right leg is stronger than left)  Educated on calf stretching and provided GTB and BTB for strengthening at home  Reviewed heel/toe gait pattern     Pain Level (0-10 scale) post treatment: 0/10     ASSESSMENT/Changes in Function: Pt progressing towards goals with good familial support of brother. They have been working on HEP daily to gain strength. He struggles with buckling with fatigue or utilizing genu recurvatum for support in standing during instances of faitgue and was educated on progressing awareness of reducing during exercises to aide in building endurance. Will continue to progress as tolerated and update HEP as appropriate for independence with strengthening. [x]  See Plan of Care  [x]  See progress note/recertification  []  See Discharge Summary         Updated Goals: to be achieved in 4 weeks:  1. Patient will improve FOTO score by 8 points in order to demonstrate a significant improvement in function. 2. Patient will be able to perform 1 step up onto 6inch step without UE support to increase safety with negotiating curb to get into home. 3. Patient will be able to ambulate 100 feet with SBA without instances of instability to increase safety with home navigation. 4. Patient will perform Romberg balance on foam with eyes closed without LOB for 30 seconds in order to demonstrate improve safety at home.      PLAN  [x]  Upgrade activities as tolerated     [x]  Continue plan of care  []  Update interventions per flow sheet       []  Discharge due to:_  []  Other:_      Herbert Majano PTA 6/3/2022  2:11 PM    Future Appointments   Date Time Provider Luke Doe   6/3/2022 12:45 PM Cami Ritter PTA [facility]   6/3/2022  1:30 PM Cristina Quintero [facility]   6/10/2022 11:15 AM Go PEACES[facility]   6/10/2022 12:00 PM Maura Torres, PT [facility]   7/22/2022 12:30 PM CSI ECHO GE[facility] BS AMB   8/26/2022 11:15 AM Loring Gaucher, MD [facility] BS AMB

## 2022-06-10 ENCOUNTER — HOSPITAL ENCOUNTER (OUTPATIENT)
Dept: PHYSICAL THERAPY | Age: 47
Discharge: HOME | End: 2022-06-10
Payer: COMMERCIAL

## 2022-06-10 PROCEDURE — 97112 NEUROMUSCULAR REEDUCATION: CPT

## 2022-06-10 PROCEDURE — 97530 THERAPEUTIC ACTIVITIES: CPT

## 2022-06-10 PROCEDURE — 97110 THERAPEUTIC EXERCISES: CPT

## 2022-06-10 PROCEDURE — 97535 SELF CARE MNGMENT TRAINING: CPT

## 2022-06-10 NOTE — PROGRESS NOTES
PT DAILY TREATMENT NOTE     Patient Name: Makayla Elliott  Date:6/10/2022  : 1975  [x]  Patient  Verified  Payor: [address] [place] [address] Road / Plan: [address] / Product Type: Managed Care Medicaid /    In time: 12:00  Out time: 12:42  Total Treatment Time (min): 42  Visit #: 2 of 8    Treatment Area: Generalized weakness [R53.1]    SUBJECTIVE  Pain Level (0-10 scale):  0/10  Any medication changes, allergies to medications, adverse drug reactions, diagnosis change, or new procedure performed?: [x] No    [] Yes (see summary sheet for update)  Subjective functional status/changes:   [] No changes reported  Pt. Reports he is feeling pretty good today. Pt. And brother report no falls since last visit. OBJECTIVE    17 min Therapeutic Exercise:  [x] See flow sheet :   Rationale: increase ROM and increase strength to improve the patients ability to perform ADLs    15 min Therapeutic Activity:  []  See flow sheet : step ups, eccentric step downs, curb negotiation, ambulation with SPC   Rationale: increase strength and improve coordination  to improve the patients ability to ambulate     10 min Neuromuscular Re-education:  []  See flow sheet : standing balance activities, liliana disc activities    Rationale: increase strength, improve coordination and improve balance  to improve the patients ability to ambulate          With   [x] TE   [] TA   [] neuro   [] other: Patient Education: [x] Review HEP    [] Progressed/Changed HEP based on:   [] positioning   [] body mechanics   [] transfers   [] heat/ice application    [] other:      Other Objective/Functional Measures:   Pt.  Tolerated PT well with no reports of increased pain  He requires cues to slow down and take his time during exercises  He continues to stand with full knee extension during exercises  Attempted ambulation with SPC with some difficulty with sequencing and he remained unsteady during ambulation  Required cues to prevent knees from fully locking out during leg press    Pain Level (0-10 scale) post treatment:  0/10    ASSESSMENT/Changes in Function:  Pt. Is progressing slowly towards goals. He is compliant with his HEP and he has good family support. He has no recent falls but continues to have episodes of his knees buckling. His knees buckle more when initiating walking and with fatigue. Patient will continue to benefit from skilled PT services to modify and progress therapeutic interventions, address functional mobility deficits, address ROM deficits, address strength deficits, analyze and address soft tissue restrictions, analyze and cue movement patterns, analyze and modify body mechanics/ergonomics and assess and modify postural abnormalities to attain remaining goals. Progress towards goals / Updated goals:  1. Patient will improve FOTO score by 8 points in order to demonstrate a significant improvement in function. 2. Patient will be able to perform 1 step up onto 6inch step without UE support to increase safety with negotiating curb to get into home. Not met (6/10/22)   3. Patient will be able to ambulate 100 feet with SBA without instances of instability to increase safety with home navigation.   4. Patient will perform Romberg balance on foam with eyes closed without LOB for 30 seconds in order to demonstrate improve safety at home.     PLAN  []  Upgrade activities as tolerated     [x]  Continue plan of care  []  Update interventions per flow sheet       []  Discharge due to:_  []  Other:_      Josefina Miller PT 6/10/2022  7:10 AM    Future Appointments   Date Time Provider Luke Doe   6/10/2022 11:15 AM Ellen Dopp [facility]   6/10/2022 12:00 PM Aliya Meyers PT [facility]   7/22/2022 12:30 PM [facility] ECHO [facility] BS AMB   8/26/2022 11:15 AM Thomas Guillaume MD [facility] BS AMB

## 2022-06-10 NOTE — PROGRESS NOTES
OCCUPATIONAL THERAPY - DAILY TREATMENT NOTE    Patient Name: Elvira Cain        Date: 6/10/2022  : 1975   YES Patient  Verified  Visit #:   3   of   8  Insurance: Payor: [address] [place] [address] Road / Plan: [address] / Product Type: Managed Care Medicaid /      In time: 12:50  Out time: 1:30   Total Treatment Time: 40       TREATMENT AREA =  Bilateral UE     SUBJECTIVE    Pain Level (on 0 to 10 scale):  0  / 10   Medication Changes/New allergies or changes in medical history, any new surgeries or procedures? NO    If yes, update Summary List   Subjective Functional Status/Changes:  []  No changes reported     \"I am good. \"      \"I feel tired, but stronger. \"              OBJECTIVE    22 min Therapeutic Exercise:  [x]  See flow sheet   Rationale:      increase ROM and increase strength to improve the patients ability to  and pinch for ADL participation.     -see flow sheet   -yellow t-bar three ways x10   -catch x10 with medium sized ball   -dribbling basketball x10 (bilateral coordination/gross motor)   -bouncing basketball to therapist x10    -dowel raises/rows x10 1#    -x20 1\" pegs with 15# gripper to remove     10 min Therapeutic Activity:    Rationale:    increase ROM, increase strength and improve coordination to improve the patients ability to , pinch, and manipulate small objects. -/\" pegs soft yellow theraputty    -screw/unscrew nuts and bolts x5 (left to hold, right to screw/unscrew)       8 min Self Care/Home Management:    Rationale:    Increase activity tolerance for participation in ADLs.      -towel folds   -towel slides (scrubbing) table top      min Patient Education:  [employer]   []  Progressed/Changed HEP based on:   Pt/caregiver educated on hand gripper use and benefits during tx activities      Other Objective/Functional Measures:    -Min A with pronation/supination of yellow t-bar   -\" pegs located in soft yellow theraputty -10/10 consecutive catches with no drops when tossing medium sized ball   -Sustained grasp on 15# gripper for 80% of the task to remove 1\" pegs      Post Treatment Pain Level (on 0 to 10) scale:   0  / 10     ASSESSMENT  Assessment/Changes in Function:     Pt required rest breaks throughout tx session due to onset of muscle fatigue/decreased activity tolerance   Progressing with UE strengthening   No pain post tx session        []  See Progress Note/Recertification   Patient will continue to benefit from skilled OT services to modify and progress therapeutic interventions, address strength deficits, analyze and modify body mechanics/ergonomics and instruct in home and community integration to attain remaining goals. Progress toward goals / Updated goals:    1. Caregiver to be familiar with adaptive equipment to ease bathing. Status at PN 5/27/2022 - will continue to address due to limited visits     2. Patient will be independent in home exercise program for UE strengthening within limits of diagnosis. Status at PN 5/27/2022- pt and caregiver provided and instructed in  and pinch strengthening HEP to be completed with soft therapy putty, pt completed activities with putty during this treatment session, will continue to address due to limited visits            1.  Patient will be minimal assist for bathing for safety. 5/27/2022 - pt has shower chair, bath mat in tub  - requires assistance to step into and out of tub. Therapist suggested tub bench in future if necessary - will continue to address due to limited visits     2.  Patient will be able to participate in desired activities incorporating goals of improving strength.   Status at PN 5/27/2022 - pt enjoys walking outside, watching TV - will continue to address due to limited visits     3.  Patient will improve self care independence as shown by increase in FOTO of at least 10 points.   Status at PN 5/27/2022 - NT, will continue to address due to limited visits      PLAN  []  Upgrade activities as tolerated YES Continue plan of care   []  Discharge due to :    []  Other:      Therapist: AKANKSHA James/GREGG    Date: 6/10/2022 Time: 12:21 PM     Future Appointments   Date Time Provider Luke Doe   6/10/2022 12:45 PM Naida Ramos, OT [facility]   7/22/2022 12:30 PM [facility] E[facility] BS AMB   8/26/2022 11:15 AM Néstor Au MD [facility] BS AMB

## 2022-06-23 ENCOUNTER — TELEPHONE (OUTPATIENT)
Dept: PHYSICAL THERAPY | Age: 47
End: 2022-06-23

## 2022-07-13 ENCOUNTER — DOCUMENTATION ONLY (OUTPATIENT)
Dept: FAMILY MEDICINE CLINIC | Age: 47
End: 2022-07-13

## 2022-07-18 ENCOUNTER — TELEPHONE (OUTPATIENT)
Dept: CARDIOLOGY CLINIC | Age: 47
End: 2022-07-18

## 2022-07-18 NOTE — TELEPHONE ENCOUNTER
----- Message from Jillian Andrade DO sent at 7/13/2022  9:38 PM EDT -----  His echo is normal  I believe his brother, who brought him to his appt is his caregiver now  No further CV testing needed, just call us if need help in future  ----- Message -----  From: Jignesh Lee RN  Sent: 7/13/2022   6:13 AM EDT  To: Jillian Andrade DO    Per your last note \"    Preop risk  Muscular dystrophy     Though I dont know details of his MD- he doesn't seem to have Duchennes or Marie Darnell. I dont suspect related cardiomyopathy but really need it to due his limited functional capacity in order to safely risk stratify him for possible upcoming procedures.  If his echo is benign no further CV eval or follow up planned.     Above dw pt and his brother     Thank you for allowing me to participate in the care of your patient, please do not hesitate to call with questions or

## 2022-07-22 ENCOUNTER — HOSPITAL ENCOUNTER (OUTPATIENT)
Dept: PHYSICAL THERAPY | Age: 47
Discharge: HOME | End: 2022-07-22
Payer: COMMERCIAL

## 2022-07-22 PROCEDURE — 97110 THERAPEUTIC EXERCISES: CPT

## 2022-07-22 PROCEDURE — 97530 THERAPEUTIC ACTIVITIES: CPT

## 2022-07-22 PROCEDURE — 97112 NEUROMUSCULAR REEDUCATION: CPT

## 2022-07-22 PROCEDURE — 97535 SELF CARE MNGMENT TRAINING: CPT

## 2022-07-22 NOTE — PROGRESS NOTES
OCCUPATIONAL THERAPY - DAILY TREATMENT NOTE    Patient Name: Suman Mosher        Date: 2022  : 1975   YES Patient  Verified  Visit #:   4   of   8  [facility] / Plan: [address] / Product Type: Managed Care Medicaid /      In time: 9:45 Out time: 10:30   Total Treatment Time: 45     TREATMENT AREA =  Bilateral UE    SUBJECTIVE    Pain Level (on 0 to 10 scale):  0  / 10   Medication Changes/New allergies or changes in medical history, any new surgeries or procedures? NO    If yes, update Summary List   Subjective Functional Status/Changes:  []  No changes reported     \"We haven't been doing many exercises at home because our family was visiting. \"          OBJECTIVE      20 min Therapeutic Exercise:  [x]  See flow sheet   Rationale:      increase ROM and increase strength to improve the patients ability to   and pinch for ADL participation. -recheck   -see flow sheet  -2# dowel raises/rows x10   -yellow t-bar three ways x10   -1# yellow clip three point pinch to retrieve x20 pom poms from table top bilateral   -1.5# digiflex x10 bilateral   -yellow putty food items 1 round each hand       15 min Therapeutic Activity:    Rationale:    increase ROM, increase strength, and improve coordination to improve the patients ability to , pinch, and manipulate small objects for ADL participation.      -FOTO reassessment   -x20 1/4\" pegs tip pinch bilateral  -x5 screw/unscrew bolts from post with right         10 min Self Care/Home Management:    Rationale:    increase ROM, strength, and activity tolerance for ADL participation.     -Caregiver and pt ed on use of assistive equipment (dycem) for daily tasks   -Towel folds        min Patient Education:  YES  Reviewed HEP   []  Progressed/Changed HEP based on:   Pt and caregiver education to continue with putty HEP to improve intrinsic hand strength      Other Objective/Functional Measures:    -Min verbal and visual cueing for 2# dowel raises/rows   -Mod A to sustain three point pinch on 1# clip bilaterally to retrieve poms poms from table top   -Sustained grasp and control for x10 consecutive squeezes of 1.5# digiflex        Post Treatment Pain Level (on 0 to 10) scale:   0  / 10     ASSESSMENT  Assessment/Changes in Function:     Pt continues to present with deficits in bilateral UE strength. Pt continues to present with deficits in bilateral fine motor coordination and dexterity. Per caregiver report, pt is progressing with independence in performance and participation in bathing tasks. Per caregiver report, pt requires Mod A for transfers in and out of the tub. Pt is continuing to participate in physical activities to increase overall strength. Pt is progressing towards goals. []  See Progress Note/Recertification   Patient will continue to benefit from skilled OT services to modify and progress therapeutic interventions, address ROM deficits, address strength deficits, and instruct in home and community integration to attain remaining goals. Progress toward goals / Updated goals:    1. Caregiver to be familiar with adaptive equipment to ease bathing. Status at PN 5/27/2022 - will continue to address due to limited visits  Status at PN 7/22/2022: Caregiver reports using shower chair during bathing task. Goal not met. 2. Patient will be independent in home exercise program for UE strengthening within limits of diagnosis. Status at PN 5/27/2022- pt and caregiver provided and instructed in  and pinch strengthening HEP to be completed with soft therapy putty, pt completed activities with putty during this treatment session, will continue to address due to limited visits  Status at PN 7/22/2022: Caregiver reports that they have not been able to do HEP with theraputty due to family visiting and personal obligations. Goal not met.             1.  Patient will be minimal assist for bathing for safety. 5/27/2022 - pt has shower chair, bath mat in tub  - requires assistance to step into and out of tub. Therapist suggested tub bench in future if necessary - will continue to address due to limited visits  Status at PN 7/22/2022: Caregiver reports continuing to help pt in and out of the tub (Mod A). Pt has shower chair. Caregiver reports that he has used towel under pt arm for support and guidance during assistance for transfer. Continue to address. 2.  Patient will be able to participate in desired activities incorporating goals of improving strength. Status at PN 5/27/2022 - pt enjoys walking outside, watching TV - will continue to address due to limited visits  Status at PN 7/22/2022: Caregiver reports that pt has been swimming at the pool and playing football. Continue to address. 3.  Patient will improve self care independence as shown by increase in FOTO of at least 10 points. Status at PN 5/27/2022 - NT, will continue to address due to limited visits   Status at PN 7/22/2022: 45 (+22). Goal met.       PLAN  []  Upgrade activities as tolerated YES Continue plan of care   []  Discharge due to :    []  Other:      Therapist: AKANKSHA Pelayo/L    Date: 7/22/2022 Time: 9:23 AM     Future Appointments   Date Time Provider Luke Doe   7/22/2022  9:45 AM Don Haile OT [facility]   7/22/2022 10:30 AM Mary Grace Pires PT [facility]   8/26/2022 11:15 AM Williams De La Rosa MD [facility] BS AMB

## 2022-07-22 NOTE — PROGRESS NOTES
[facility]  [facility]  [phone] fax    Continued Plan of Care/ Re-certification for Occupational Therapy Services    Patient name: Alejandra Harvey Start of Care: 2022   Referral source: Moisés Mascorro MD : 1975   Medical/Treatment Diagnosis: Weakness of right upper extremity [R29.898]  Weakness of left upper extremity [R29.898]  Payor: [address] [place] [address] Road / Plan: [address] / Product Type: Managed Care Medicaid /  Onset Date: birth      Prior Hospitalization: see medical history Provider#: [id number]   Medications: Verified on Patient Summary List    Comorbidities: *myotonic muscular dystrophy**  Prior Level of Function:I self care  Visits from Start of Care: 4    Missed Visits: 0    The Plan of Care and following information is based on the patient's current status:       1. Caregiver to be familiar with adaptive equipment to ease bathing. Status at last note/certification: - will continue to address due to limited visits  Status at PN: Caregiver reports using shower chair during bathing task. Current Status: not met     2. Patient will be independent in home exercise program for UE strengthening within limits of diagnosis. Status at last note/certification: pt and caregiver provided and instructed in  and pinch strengthening HEP to be completed with soft therapy putty, pt completed activities with putty during this treatment session, will continue to address due to limited visits  Status at PN: Caregiver reports that they have not been able to do HEP with theraputty due to family visiting and personal obligations  Current Status: not met            3. Patient will be minimal assist for bathing for safety. Status at last note/certification:pt has shower chair, bath mat in tub  - requires assistance to step into and out of tub.  Therapist suggested tub bench in future if necessary - will continue to address due to limited visits  Status at PN: Caregiver reports continuing to help pt in and out of the tub (Mod A). Pt has shower chair. Caregiver reports that he has used towel under pt arm for support and guidance during assistance for transfer. Current Status: not met     4. Patient will be able to participate in desired activities incorporating goals of improving strength. Status at last note/certification: pt enjoys walking outside, watching TV - will continue to address due to limited visits  Status at PN: Caregiver reports that pt has been swimming at the pool and playing football. Current Status: not met     5. Patient will improve self care independence as shown by increase in FOTO of at least 10 points. Status at last note/certification: NT, will continue to address due to limited visits   Status at PN:  45 (+22). Current Status: met    Key functional changes:  Pt continues to present with deficits in bilateral UE strength. Pt continues to present with deficits in bilateral fine motor coordination and dexterity. Per caregiver report, pt is progressing with independence in performance and participation in bathing tasks. Per caregiver report, pt requires Mod A for transfers in and out of the tub. Pt is continuing to participate in physical activities to increase overall strength. Pt is progressing towards goals. Problems/ barriers to goal attainment: Decreased strength, fine motor coordination/dexterity and activity tolerance     Treatment Plan: Therapeutic exercise, Therapeutic activities, Patient education, ADLs/IADLs, and Other        Goals for this certification period to be accomplished in 4 weeks:    1. Caregiver to be familiar with adaptive equipment to ease bathing. Status at PN 7/22/2022: Caregiver reports using shower chair during bathing task. Caregiver reports continuing to use dycem. Goal not met.       2. Patient will be independent in home exercise program for UE strengthening within limits of diagnosis. Status at PN 7/22/2022: Caregiver reports that they have not been able to do HEP with theraputty due to family visiting and personal obligations. Goal not met. 3.  Patient will be minimal assist for bathing for safety. Status at PN 7/22/2022: Caregiver reports continuing to help pt in and out of the tub (Mod A). Pt has shower chair. Caregiver reports that he has used towel under pt arm for support and guidance during assistance for transfer. Continue to address. 4.  Patient will be able to participate in desired activities incorporating goals of improving strength. Status at PN 7/22/2022: Caregiver reports that pt has been swimming at the pool and playing football. Continue to address. Frequency / Duration: Patient to be seen 2 times per week for 4 weeks:    Assessment / Recommendations: Pt would continue to benefit from skilled occupational therapy services to increase strength and ADL status in order to improve participation in ADLs/IADLs. Certification Period: N/A    AKANKSHA Drummond/L 7/22/2022 9:23 AM    ________________________________________________________________________  I certify that the above Therapy Services are being furnished while the patient is under my care. I agree with the treatment plan and certify that this therapy is necessary.       [de-identified] Signature:____________Date:_________TIME:________     Radha Rodney MD  ** Signature, Date and Time must be completed for valid certification **      Please sign and return to [facility] [employer] [facility]  [address] [place] [address]            [phone] fax

## 2022-07-22 NOTE — PROGRESS NOTES
PT DAILY TREATMENT NOTE     Patient Name: Isabel Balderas  Date:2022  : 1975  [x]  Patient  Verified  Payor: [address] / Plan: [address] / Product Type: Managed Care Medicaid /    In time: 10:31  Out time: 11:12  Total Treatment Time (min): 41  Visit #: 3 of 8    Treatment Area: Generalized weakness [R53.1]    SUBJECTIVE  Pain Level (0-10 scale): 0/10  Any medication changes, allergies to medications, adverse drug reactions, diagnosis change, or new procedure performed?: [x] No    [] Yes (see summary sheet for update)  Subjective functional status/changes:   [] No changes reported  Pt. Reports he is doing better overall but his legs continue to give out and he continues to feel off balance. His brother is now able to give him rides more consistently to PT.     OBJECTIVE    21 min Therapeutic Exercise:  [x] See flow sheet :   Rationale: increase ROM and increase strength to improve the patients ability to perform ADLs    10 min Therapeutic Activity:  []  See flow sheet : re-assess   Rationale: increase ROM and increase strength  to improve the patients ability to ambulate      10 min Neuromuscular Re-education:  []  See flow sheet : standing balance activities, side stepping, backward ambulation with min A   Rationale: increase strength, improve coordination, and improve balance  to improve the patients ability to perform ADLs          With   [x] TE   [] TA   [] neuro   [] other: Patient Education: [x] Review HEP    [] Progressed/Changed HEP based on:   []    [] body mechanics   [] transfers   [] heat/ice application    [] other:      Other Objective/Functional Measures:    FOTO: 50 points  6 inch step up: required UE support  Ambulation without assistance: CGA to min A when legs give out  Romberg balance on foam: 24 seconds EC: 2 seonds     Pain Level (0-10 scale) post treatment: 0/10    ASSESSMENT/Changes in Function:      []  See Plan of Care  [x]  See progress note/recertification  []  See Discharge Summary         Progress towards goals / Updated goals:  See progress note    PLAN  []  Upgrade activities as tolerated     [x]  Continue plan of care  []  Update interventions per flow sheet       []  Discharge due to:_  []  Other:_      Jonathan Jacobo, PT 7/22/2022  7:07 AM    Future Appointments   Date Time Provider Luke Doe   7/22/2022  9:45 AM Ayad Pond, OT [facility]   7/22/2022 10:30 AM Kenny Chaudhry, AGNES [facility]   8/26/2022 11:15 AM Armaan Prado MD [facility] BS AMB

## 2022-07-22 NOTE — PROGRESS NOTES
[facility] [employer]  [address] [place] [address]  [phone] fax    Physical Therapy Progress Note  Patient name: Tessa Rodriguez Start of Care: 2022   Referral source: Ester Travis MD : 1975                Medical Diagnosis: Muscle weakness [M62.81]  Payor: [address] [place] [address] Road / Plan: [address] / Product Type: Managed Care Medicaid /  Onset Date: 2022                Treatment Diagnosis: Impaired gait/balance; Muscle weakness    Prior Hospitalization: see medical history Provider#: [id number]   Medications: Verified on Patient summary List    Comorbidities:Neurological disease, Myotonic Dystrophy Type 1 Back pain, GI disease, Hearing impairment, other disorders, Visual impairment-2019- cataracts surgery   Prior Level of Function: Patient lives with brother and has a home attendant with him to assist as needed. Visits from Start of Care: 5    Missed Visits: 0    Established Goals:         Excellent           Good         Limited           None  [x] Increased ROM   []  []  [x]  []  [x] Increased Strength  []  []  [x]  []  [x] Increased Mobility  []  []  [x]  []   [x] Decreased Pain   []  []  [x]  []  [] Decreased Swelling  []  []  []  []    Key Functional Changes: pt. Reports balance is improving overall but his legs continue to give out at times causing falls. FOTO score did improve to 50 points indicating a significant improvement in function. He continues to require CGA for ambulation and min A to recover from his legs giving out. Romberg balance on foam was 24 seconds with eyes open and 2 seconds with eyes closed. He also continues to demonstrate poor ankle strength with inability to perform heel raises. Skilled PT is medically necessary in order to improve LE strength and balance for increased ease of ambulation and improved quality of life.      Updated Goals: to be achieved in 4 weeks:  Goal: Patient will be able to perform 1 step up onto 6inch step without UE support to increase safety with negotiating curb to get into home. Status at evaluation/last progress note: 1 UE support    2. Goal:  Patient will perform Romberg balance on foam with eyes closed without LOB for 30 seconds in order to demonstrate improve safety at home. Status at evaluation/last progress note: 2 seconds    3. Goal:  Patient will be able to ambulate 100 feet with SBA without instances of instability to increase safety with home navigation. Status at evaluation/last progress note: CGA    4. Goal: Patient will perform 10 standing heel raises in order to demonstrate improving ankle strength for increase ease of ambulation. Status at evaluation/last progress note: unable to perform     ASSESSMENT/RECOMMENDATIONS:  [x]Continue therapy per initial plan/protocol at a frequency of  2 x per week for 4 weeks  []Continue therapy with the following recommended changes:_____________________      _____________________________________________________________________  []Discontinue therapy progressing towards or have reached established goals  []Discontinue therapy due to lack of appreciable progress towards goals  []Discontinue therapy due to lack of attendance or compliance  []Await Physician's recommendations/decisions regarding therapy  []Other:________________________________________________________________    Thank you for this referral.   Susie Ortiz, PT 7/22/2022 3:56 PM    NOTE TO PHYSICIAN:  [address]   FAX TO [facility]: [phone]  If you are unable to process this request in 24 hours please contact our office: [phone]    I have read the above report and request that my patient continue as recommended.   I have read the above report and request that my patient continue therapy with the following changes/special instructions:____________________________________  I have read the above report and request that my patient be discharged from therapy.     Physicians signature: ______________________________Date: ______Time:______     Kana Singer MD

## 2022-07-27 ENCOUNTER — HOSPITAL ENCOUNTER (OUTPATIENT)
Dept: PHYSICAL THERAPY | Age: 47
Discharge: HOME | End: 2022-07-27
Payer: COMMERCIAL

## 2022-07-27 PROCEDURE — 97530 THERAPEUTIC ACTIVITIES: CPT

## 2022-07-27 PROCEDURE — 97110 THERAPEUTIC EXERCISES: CPT

## 2022-07-27 PROCEDURE — 97112 NEUROMUSCULAR REEDUCATION: CPT

## 2022-07-27 PROCEDURE — 97535 SELF CARE MNGMENT TRAINING: CPT

## 2022-07-27 NOTE — PROGRESS NOTES
PT DAILY TREATMENT NOTE - [facility]     Patient Name: Sarah Xiao  Date:2022  : 1975  [x]  Patient  Verified  Payor: [address] / Plan: [address] / Product Type: Managed Care Medicaid /    In time:1033  Out time:1114  Total Treatment Time (min): 41  Visit #: 4 of     Treatment Area: Generalized weakness [R53.1]    SUBJECTIVE  Pain Level (0-10 scale): 0/10  Any medication changes, allergies to medications, adverse drug reactions, diagnosis change, or new procedure performed?: [x] No    [] Yes (see summary sheet for update)  Subjective functional status/changes:   [] No changes reported  Pt reports his muscles feel tight. He does ex's every day. His brother works with him every day. OBJECTIVE       21 min Therapeutic Exercise:  [x] See flow sheet :   Rationale: increase ROM and increase strength to improve the patients ability to perform ADLs     10 min Therapeutic Activity:  []  See flow sheet : sit to stand/ amb in parallel bars. Rationale: increase ROM and increase strength  to improve the patients ability to ambulate     10 min Neuromuscular Re-education:  []  See flow sheet : standing balance activities, side stepping, backward ambulation with min A/tandem for/back in parallel bars. Rationale: increase strength, improve coordination, and improve balance  to improve the patients ability to perform ADLs        With   [] TE   [] TA   [] neuro   [] other: Patient Education: [x] Review HEP    [] Progressed/Changed HEP based on:   [] positioning   [] body mechanics   [] transfers   [] heat/ice application    [] other:      Other Objective/Functional Measures: Gait belt on at all times with PT holding/guarding pt due to unexpected knee buckling and possible falls. Manual gastroc calf/HS stretch x 5 mins. Pt had knee buckling approx 3 times during session and was caught  with gait belt.    Concentrated on \"hold for 2 sec\" during Hip x 3 reps due to pt's decreased control and impulsiveness. Pain Level (0-10 scale) post treatment: 0/10    ASSESSMENT/Changes in Function: Progressing slowly. Pt has been working on a lot of carryover PT at home with help from his brother. Pt requires attention to dec speed of ex's and to focus on holding each rep. Patient will continue to benefit from skilled PT services to modify and progress therapeutic interventions, address functional mobility deficits, address ROM deficits, address strength deficits, analyze and address soft tissue restrictions, analyze and cue movement patterns, analyze and modify body mechanics/ergonomics, assess and modify postural abnormalities, and address imbalance/dizziness to attain remaining goals. []  See Plan of Care  [x]  See progress note/recertification  []  See Discharge Summary         Progress towards goals / Updated goals:  Goal: Patient will be able to perform 1 step up onto 6inch step without UE support to increase safety with negotiating curb to get into home. Status at evaluation/last progress note: 1 UE support     2. Goal:  Patient will perform Romberg balance on foam with eyes closed without LOB for 30 seconds in order to demonstrate improve safety at home. Status at evaluation/last progress note: 2 seconds     3. Goal:  Patient will be able to ambulate 100 feet with SBA without instances of instability to increase safety with home navigation. Status at evaluation/last progress note: CGA     4. Goal: Patient will perform 10 standing heel raises in order to demonstrate improving ankle strength for increase ease of ambulation.    Status at evaluation/last progress note: unable to perform        PLAN  []  Upgrade activities as tolerated     []  Continue plan of care  []  Update interventions per flow sheet       []  Discharge due to:_  []  Other:_      Efrain Kent, PT 7/27/2022  9:29 AM    Future Appointments   Date Time Provider Luke Doe   7/27/2022 10:30 AM Carlos Dale, PT [facility]   7/27/2022 11:15 AM Cynda Albertina [id number] [facility]   7/29/2022  1:30 PM Cynda Homeradha YOGONPM [facility]   7/29/2022  2:15 PM Lisa Chung, PTA [facility]   8/3/2022  9:45 AM Shmuelpatricia Mata, PTA [facility]   8/3/2022 10:30 AM Giselmarija Vasylmatti COLEENZTOP [facility]   8/5/2022 10:30 AM Cynda Albertina OQXMBEF [facility]   8/5/2022 11:15 AM Lisa Chung, PTA [facility]   8/9/2022 12:45 PM Laurie Mesa, PT QPRMZKH [facility]   8/9/2022  1:30 PM Mauricio Eng NXSIFJC [facility]   8/12/2022 11:15 AM Albertina Fischer, PT [facility]   8/12/2022 12:00 PM Sandhya Biggs, OT [facility]   8/26/2022 11:15 AM MD SABRINA ConnorFP BS AMB   9/21/2022  3:00 PM JONATHAN YANCEY BSPSC BS AMB

## 2022-07-27 NOTE — PROGRESS NOTES
OT DAILY TREATMENT NOTE 10-18    Patient Name: Vitaly Aaron  Date:2022  : 1975  [x]  Patient  Verified  Payor: [address] [place] [address] Road / Plan: [address] / Product Type: Managed Care Medicaid /    In time:1120  Out time:1200  Total Treatment Time (min): 40  Visit #: 1 of 8    Treatment Area: Weakness of right upper extremity [R29.898]  Weakness of left upper extremity [R29.898]    SUBJECTIVE  Pain Level (0-10 scale): 0/10  Any medication changes, allergies to medications, adverse drug reactions, diagnosis change, or new procedure performed?: [x] No    [] Yes (see summary sheet for update)  Subjective functional status/changes:   [] No changes reported  Patient brother reporting delay with insurance authorization caused them to miss therapy     OBJECTIVE    15 min Therapeutic Exercise:  [] See flow sheet :   Rationale: increase ROM and increase strength to improve the patients ability to     Yellow Therabar: 3 ways  10x each   B Hands: 2# Graded clothes pin: Pom Pom     17 min Therapeutic Activity:  []  See flow sheet :   Rationale: increase ROM and improve coordination  to improve the patients ability to manipulate small items     Connect 4: 4# graded clothes pin used with B Hands to place game pieces into vertical board     Grooved Pegs: Right hand (With left intermittent assist) for placement , digit to palm translation for removal with Right hand only       8 min Self Care/Home Management: Clothing management    Rationale: increase ROM, increase strength, and improve coordination  to improve the patients ability to perform daily tasks with increased independence     Folding: Using BUE to large towel         With   [] TE   [] TA   [] neuro   [] other: Patient Education: [x] Review HEP    [] Progressed/Changed HEP based on:   [] positioning   [] body mechanics   [] transfers   [] heat/ice application   [] Splint wear/care   [] Sensory re-education   [] scar management      [] other:            Other Objective/Functional Measures: Towels used under each elbow to keep elbows in during therabar task    No drops with digit to palm translation of grooved pegs     Pain Level (0-10 scale) post treatment: 0/10    ASSESSMENT/Changes in Function: FM improving     Patient will continue to benefit from skilled OT services to modify and progress therapeutic interventions, address ROM deficits, address strength deficits, and instruct in home and community integration to attain remaining goals. []  See Plan of Care  []  See progress note/recertification  []  See Discharge Summary         Progress towards goals / Updated goals:  1. Caregiver to be familiar with adaptive equipment to ease bathing. Status at PN 7/22/2022: Caregiver reports using shower chair during bathing task. Caregiver reports continuing to use dycem. Goal not met. 2. Patient will be independent in home exercise program for UE strengthening within limits of diagnosis. Status at PN 7/22/2022: Caregiver reports that they have not been able to do HEP with theraputty due to family visiting and personal obligations. Goal not met. 3.  Patient will be minimal assist for bathing for safety. Status at PN 7/22/2022: Caregiver reports continuing to help pt in and out of the tub (Mod A). Pt has shower chair. Caregiver reports that he has used towel under pt arm for support and guidance during assistance for transfer. Continue to address. 4.  Patient will be able to participate in desired activities incorporating goals of improving strength. Status at PN 7/22/2022: Caregiver reports that pt has been swimming at the pool and playing football. Continue to address.      PLAN  []  Upgrade activities as tolerated     [x]  Continue plan of care  []  Update interventions per flow sheet       []  Discharge due to:_  []  Other:_      REMA Feliz  7/27/2022  9:40 AM        Future Appointments Date Time Provider Luke Doe   7/27/2022 10:30 AM Martinez Pham, PT [facility]   7/27/2022 11:15 AM Gaurang Modi [id number] [facility]   7/29/2022  1:30 PM Gaurang Modi FESSKMR [facility]   7/29/2022  2:15 PM Gurmeet Frances, PTA [facility]   8/3/2022  9:45 AM Job Mckeon, PTA [facility]   8/3/2022 10:30 AM Gaurang Rogergil XXBFCPM [facility]   8/5/2022 10:30 AM Gaurang Modi [id number] [facility]   8/5/2022 11:15 AM Gurmeet Frances, PTA [facility]   8/9/2022 12:45 PM Joan Larson, PT UTGGYSE [facility]   8/9/2022  1:30 PM Gaurang Rogergil IQDWJHG [facility]   8/12/2022 11:15 AM Kenny Chaudhry, PT [facility]   8/12/2022 12:00 PM Mallissa Burn, OT [facility]   8/26/2022 11:15 AM MD SABRINA AnneFP BS AMB   9/21/2022  3:00 PM PPA NAYE BSPSC BS AMB

## 2022-07-28 ENCOUNTER — TELEPHONE (OUTPATIENT)
Dept: NEUROLOGY | Age: 47
End: 2022-07-28

## 2022-07-28 NOTE — TELEPHONE ENCOUNTER
Valerie from [facility] called because she needed Tiffanie Langley to sign some papers she faxed over for the pt, and she has not received them yet. I explained Dr. Tiffanie Shelton will not be in office until September so they wanted to know if another provider could sign them. Valerie [phone]  Please advise.

## 2022-07-29 ENCOUNTER — HOSPITAL ENCOUNTER (OUTPATIENT)
Dept: PHYSICAL THERAPY | Age: 47
Discharge: HOME | End: 2022-07-29
Payer: COMMERCIAL

## 2022-07-29 DIAGNOSIS — G71.11 MYOTONIC DYSTROPHY (HCC): Primary | ICD-10-CM

## 2022-07-29 PROCEDURE — 97110 THERAPEUTIC EXERCISES: CPT

## 2022-07-29 PROCEDURE — 97530 THERAPEUTIC ACTIVITIES: CPT

## 2022-07-29 PROCEDURE — 97112 NEUROMUSCULAR REEDUCATION: CPT

## 2022-07-29 PROCEDURE — 97535 SELF CARE MNGMENT TRAINING: CPT

## 2022-07-29 NOTE — PROGRESS NOTES
PT DAILY TREATMENT NOTE     Patient Name: Mariano Castle  Date:2022  : 1975  [x]  Patient  Verified  Payor: [address] / Plan: [address] / Product Type: Managed Care Medicaid /    In time: 2:15  Out time: 3:00  Total Treatment Time (min): 45  Visit #: 2 of 8    Treatment Area: Generalized weakness [R53.1]    SUBJECTIVE  Pain Level (0-10 scale): 0/10  Any medication changes, allergies to medications, adverse drug reactions, diagnosis change, or new procedure performed?: [x] No    [] Yes (see summary sheet for update)  Subjective functional status/changes:   [] No changes reported  Pt and brother report increased pain and swelling in left calf since last session. His brother gave him a little break with HEP because of that. OBJECTIVE    20 min Therapeutic Exercise:  [x] See flow sheet :   Rationale: increase ROM and increase strength to improve the patients ability to perform ADLs    15 min Therapeutic Activity:  []  See flow sheet : sit to stands, step ups, HR/TR   Rationale: increase ROM and increase strength  to improve the patients ability to ambulate      10 min Neuromuscular Re-education:  []  See flow sheet : standing balance activities and side stepping with CGA, backward ambulation with Min A   Rationale: increase strength, improve coordination, and improve balance  to improve the patients ability to perform ADLs          With   [x] TE   [] TA   [] neuro   [] other: Patient Education: [x] Review HEP    [] Progressed/Changed HEP based on:   []    [] body mechanics   [] transfers   [] heat/ice application    [] other:      Other Objective/Functional Measures:   Gait belt on at all times with PT holding/guarding pt due to unexpected knee buckling and possible falls. Frequent knee buckling with ambulation in parallel bars  Concentrated on \"hold for 2 sec\" during Hip x 3, [facility] reps due to pt's decreased control and impulsiveness.    Added standing marching and HSC without UE support    Educated on ice and elevate to decrease swelling in calf, if no change to follow up with PCP    Pain Level (0-10 scale) post treatment: 0/10    ASSESSMENT/Changes in Function:   Patient reports increased soreness with therex but no pain except with LAQ on left LE. Patient noted to have increase lateral calf tightness and pain. Advised to use ice and elevation to decrease swelling and to follow with MD if not better. Patient requires frequent cuing to tell therapist if he has pain with therex. CGA for romberg EC/romberg foam EO and Min A for romberg on foam EC. Patient requires moderate cuing for neutral c/s with ambulation and therex. []  See Plan of Care  [x]  See progress note/recertification  []  See Discharge Summary         Updated Goals: to be achieved in 4 weeks:  Goal: Patient will be able to perform 1 step up onto 6inch step without UE support to increase safety with negotiating curb to get into home. Status at evaluation/last progress note: 1 UE support     2. Goal:  Patient will perform Romberg balance on foam with eyes closed without LOB for 30 seconds in order to demonstrate improve safety at home. Status at evaluation/last progress note: 2 seconds  Current Status: gait belt and Min A for balance (7/29/22)     3. Goal:  Patient will be able to ambulate 100 feet with SBA without instances of instability to increase safety with home navigation. Status at evaluation/last progress note: CGA     4. Goal: Patient will perform 10 standing heel raises in order to demonstrate improving ankle strength for increase ease of ambulation.    Status at evaluation/last progress note: unable to perform   Current Status: unable to perform in standing with BUE support (7/29/22)    PLAN  []  Upgrade activities as tolerated     [x]  Continue plan of care  []  Update interventions per flow sheet       []  Discharge due to:_  []  Other:_      Shaun Rojas PTA 7/29/2022  3:00 PM    Future Appointments   Date Time Provider Luke Doe   7/29/2022  1:30 PM Reyna Camera FLMFTNW [facility]   7/29/2022  2:15 PM Princess Sexton, PTA MMCPTPB [facility]   8/3/2022  9:45 AM Rosanna Moore, PTA MMCPTPB [facility]   8/3/2022 10:30 AM Reyna Camera MMCPTPB [facility]   8/5/2022 10:30 AM Reyna Camera MMCPTPB [facility]   8/5/2022 11:15 AM Princess Sexton, PTA MMCPTPB [facility]   8/9/2022 12:45 PM Mann Arias, PT TZHREGR [facility]   8/9/2022  1:30 PM Reyna Camera MCVNQXR [facility]   8/12/2022 11:15 AM Victorino Baldwin, PT MMCPTPB [facility]   8/12/2022 12:00 PM Nilton Bryant, OT MMCPTPB [facility]   8/26/2022 11:15 AM MD TARA Ruano BS AMB   9/21/2022  3:00 PM JONATHAN YANCEY BSPSC BS AMB

## 2022-07-29 NOTE — PROGRESS NOTES
OT DAILY TREATMENT NOTE 10-18    Patient Name: Aydee Tijerina  Date:2022  : 1975  [x]  Patient  Verified  [facility] [address] / Plan: [address] / Product Type: Managed Care Medicaid /    In time:130  Out time:215  Total Treatment Time (min): 45  Visit #: 2 of 8      Treatment Area: Weakness of right upper extremity [R29.898]  Weakness of left upper extremity [R29.898]    SUBJECTIVE  Pain Level (0-10 scale): 0/10  Any medication changes, allergies to medications, adverse drug reactions, diagnosis change, or new procedure performed?: [x] No    [] Yes (see summary sheet for update)  Subjective functional status/changes:   [] No changes reported  Pt caregiver reporting increased swelling in LE.     OBJECTIVE    10 min Therapeutic Exercise:  [] See flow sheet :   Rationale: increase ROM and increase strength to improve the patients ability to     Yellow Therabar: 3 ways: 10x each   Scap Retraction  Scap Elevation     10 min Therapeutic Activity:  []  See flow sheet :   Rationale: increase ROM and improve coordination  to improve the patients ability to manipulate small items     Grooved Pegs: Right hand (With left intermittent assist) for placement , digit to palm translation for removal with Right hand only    25 min Self Care/Home Management:    Rationale: increase ROM, increase strength, and improve coordination  to improve the patients ability to perform self and home care with increased ease and independence     Bathing Assistance level review  Education on long handled sponge with active patient demonstration   Don/Bier pullover shirt - Independent with donning, Min A for doffing   Discussion on adaptive kitchen devices to assist with increasing independence         With   [] TE   [] TA   [] neuro   [] other: Patient Education: [x] Review HEP    [] Progressed/Changed HEP based on:   [] positioning   [] body mechanics   [] transfers   [] heat/ice application [] Splint wear/care   [] Sensory re-education   [] scar management      [] other:            Other Objective/Functional Measures:     Don pullover Shirt- seated, independent   Doff pullover shirt- seated, Min A      Pain Level (0-10 scale) post treatment: 0/10    ASSESSMENT/Changes in Function: increased fatigue noted on this date, slower pace for completion of tasks     Patient will continue to benefit from skilled OT services to modify and progress therapeutic interventions, address ROM deficits, address strength deficits, and instruct in home and community integration to attain remaining goals. []  See Plan of Care  []  See progress note/recertification  []  See Discharge Summary         Progress towards goals / Updated goals:  1. Caregiver to be familiar with adaptive equipment to ease bathing. Status at PN 7/22/2022: Caregiver reports using shower chair during bathing task. Caregiver reports continuing to use dycem. Goal not met. Current Status (7/29/2022): Goal Metv     2. Patient will be independent in home exercise program for UE strengthening within limits of diagnosis. Status at PN 7/22/2022: Caregiver reports that they have not been able to do HEP with theraputty due to family visiting and personal obligations. Goal not met. 3.  Patient will be minimal assist for bathing for safety. Status at PN 7/22/2022: Caregiver reports continuing to help pt in and out of the tub (Mod A). Pt has shower chair. Caregiver reports that he has used towel under pt arm for support and guidance during assistance for transfer. Continue to address. Current Status (7/29/2022): Goal Met, Patient performing 85% of bathing tasks per caregiver       4. Patient will be able to participate in desired activities incorporating goals of improving strength. Status at PN 7/22/2022: Caregiver reports that pt has been swimming at the pool and playing football. Continue to address.      PLAN  []  Upgrade activities as tolerated     [x]  Continue plan of care  []  Update interventions per flow sheet       []  Discharge due to:_  []  Other:_      Zhane Hannon ,CARDOSO/L  7/29/2022  11:43 AM        Future Appointments   Date Time Provider Luke Doe   7/29/2022  1:30 PM Sarah Johnson ODDLRVC [facility]   7/29/2022  2:15 PM Lawyer Monahan, PTA MMCPTPB [facility]   8/3/2022  9:45 AM Kaelyn Tatum, PTA MMCPTPB [facility]   8/3/2022 10:30 AM Sarah Johnson KKEAZDT [facility]   8/5/2022 10:30 AM Sarah Johnson WCGCGOV [facility]   8/5/2022 11:15 AM Lawyer Monahan, PTA MMCPTPB [facility]   8/9/2022 12:45 PM Neema Vega, PT GAYPHJW [facility]   8/9/2022  1:30 PM Sarah Johnson JRHRWRT [facility]   8/12/2022 11:15 AM Farida David, PT MMCPTPB [facility]   8/12/2022 12:00 PM Lori Kowalski, OT MMCPTPB [facility]   8/26/2022 11:15 AM Monika Nicolas MD HVFP BS AMB   9/21/2022  3:00 PM PPA SPIROMETRY BSPSC BS AMB

## 2022-08-01 ENCOUNTER — TELEPHONE (OUTPATIENT)
Dept: NEUROLOGY | Age: 47
End: 2022-08-01

## 2022-08-01 NOTE — TELEPHONE ENCOUNTER
Spoke with Dr. Peng Parker assistant today. See additional encounter regarding this patient's forms.

## 2022-08-01 NOTE — TELEPHONE ENCOUNTER
PCP would like to speak with you regarding form completion for this patient of Dr. Monet Villela. She can be reached at the listed number.

## 2022-08-02 ENCOUNTER — APPOINTMENT (OUTPATIENT)
Dept: PHYSICAL THERAPY | Age: 47
End: 2022-08-02
Payer: COMMERCIAL

## 2022-08-03 ENCOUNTER — HOSPITAL ENCOUNTER (OUTPATIENT)
Dept: PHYSICAL THERAPY | Age: 47
Discharge: HOME | End: 2022-08-03
Payer: COMMERCIAL

## 2022-08-03 PROCEDURE — 97535 SELF CARE MNGMENT TRAINING: CPT

## 2022-08-03 PROCEDURE — 97110 THERAPEUTIC EXERCISES: CPT

## 2022-08-03 PROCEDURE — 97530 THERAPEUTIC ACTIVITIES: CPT

## 2022-08-03 PROCEDURE — 97112 NEUROMUSCULAR REEDUCATION: CPT

## 2022-08-03 NOTE — PROGRESS NOTES
PT DAILY TREATMENT NOTE     Patient Name: Haim Fajardo  Date:8/3/2022  : 1975  [x]  Patient  Verified  [facility] / Plan: [address] / Product Type: Managed Care Medicaid /    In time: 9:45  Out time: 10:30  Total Treatment Time (min): 45  Visit #: 3 of 8    Treatment Area: Generalized weakness [R53.1]    SUBJECTIVE  Pain Level (0-10 scale): 0/10  Any medication changes, allergies to medications, adverse drug reactions, diagnosis change, or new procedure performed?: [x] No    [] Yes (see summary sheet for update)  Subjective functional status/changes:   [] No changes reported  Pt states no pain but then demonstrates increased pain and facial expressions when explaining left calf pain, quad pain and groin pain. He states the pain gets worse with straightening his leg. His brother reports he has noticed less swelling overall since initial occurrence after his  appt with Sindhu. Patient complains of pain with walking and pressure to his left calf.      OBJECTIVE    8 min Therapeutic Exercise:  [x] See flow sheet :   Rationale: increase ROM and increase strength to improve the patients ability to perform ADLs    29 min Therapeutic Activity:  []  See flow sheet : Arden Clinical Prediction; calf assessment; pt education on ice; therapist evaluation   Rationale: increase ROM and increase strength  to improve the patients ability to ambulate      8 min Neuromuscular Re-education:  []  See flow sheet : genu recurvatum education; balance exercises   Rationale: increase strength, improve coordination, and improve balance  to improve the patients ability to perform ADLs          With   [x] TE   [] TA   [] neuro   [] other: Patient Education: [x] Review HEP    [] Progressed/Changed HEP based on:   []    [] body mechanics   [] transfers   [] heat/ice application    [] other:      Other Objective/Functional Measures:   Arden Clinical Prediction Rule: 0 for low risk of DVT  TTP of the left calf with swelling present  Palpable knot in left proximal calf in the lateral head with pain to palpation  Educated on ice and rest as a calf strain likely cause and if increased swelling occurs to see PCP  Had PT Belvie Goldberg also check pt with noted calf being intact as pt was able to PF  Educated to hold on painful exercises and not push beyond pain  Instructed that likely cause of groin and quad pain being more muscle soreness from exercises  Reviewed importance of not locking knees to reduce excessive strain to calf/HS and knees  Discussed with having a consistent team to assist with better pt progression and brother agreeable that it would be most beneficial for patient    Pain Level (0-10 scale) post treatment: 0/10    ASSESSMENT/Changes in Function: Pt continues with increased left calf pain limiting progression of exercises in therapy due to pain that pt doesn't voice correctly at times during exercises. He has palpable knot in the left calf with pain upon palpation. He is limited with standing exercises and pushes through pain at times. He was educated on ice and rest and to watch swelling and return to PCP if worsens. Will continue with gentle nonpainful strengthening and balance for ease of performing ADLs and ambulating with decreased fall risk. [x]  See Plan of Care  [x]  See progress note/recertification  []  See Discharge Summary         Updated Goals: to be achieved in 4 weeks:  Goal: Patient will be able to perform 1 step up onto 6inch step without UE support to increase safety with negotiating curb to get into home. Status at evaluation/last progress note: 1 UE support     2. Goal:  Patient will perform Romberg balance on foam with eyes closed without LOB for 30 seconds in order to demonstrate improve safety at home. Status at evaluation/last progress note: 2 seconds  Current Status: gait belt and Min A for balance (7/29/22)     3.    Goal:  Patient will be able to ambulate 100 feet with SBA without instances of instability to increase safety with home navigation. Status at evaluation/last progress note: CGA     4. Goal: Patient will perform 10 standing heel raises in order to demonstrate improving ankle strength for increase ease of ambulation.    Status at evaluation/last progress note: unable to perform   Current Status: unable to perform in standing with BUE support (7/29/22)    PLAN  [x]  Upgrade activities as tolerated     [x]  Continue plan of care  []  Update interventions per flow sheet       []  Discharge due to:_  []  Other:_      Aram Franklin, PTA 8/3/2022  3:00 PM    Future Appointments   Date Time Provider Luke Doe   8/3/2022  9:45 AM Lisa Yates PTA MMCPTPB [facility]   8/3/2022 10:30 AM Delkatty Jasmine HSOSZWV [facility]   8/5/2022 10:30 AM Hussainories Kenroy FGMYWCE [facility]   8/5/2022 11:15 AM Pratima Rohan, PTA MMCPTPB [facility]   8/9/2022 12:45 PM Ginger Jeff, PT BPNNMAT [facility]   8/9/2022  1:30 PM Geeta CISNEROSYDMIREYAD [facility]   8/12/2022 11:15 AM Ronni Schuster, PT MMCPTPB [facility]   8/12/2022 12:00 PM Helen Keane, OT MMCPTPB [facility]   8/26/2022 11:15 AM Jax Gambino MD HVFP BS AMB   9/21/2022  3:00 PM PPA SPIROMETRY BSPSC BS AMB

## 2022-08-03 NOTE — PROGRESS NOTES
[facility] OT DAILY TREATMENT NOTE 10-18    Patient Name: Zelda Bell  Date:8/3/2022  : 1975  [x]  Patient  Verified  [facility] [address] / Plan: [address] / Product Type: Managed Care Medicaid /    In time:1038  Out time:1120  Total Treatment Time (min): 42  Visit #: 3 of 8    Treatment Area: Weakness of right upper extremity [R29.898]  Weakness of left upper extremity [R29.898]    SUBJECTIVE  Pain Level (0-10 scale): 0/10  Any medication changes, allergies to medications, adverse drug reactions, diagnosis change, or new procedure performed?: [x] No    [] Yes (see summary sheet for update)  Subjective functional status/changes:   [] No changes reported  Patient's caregiver reports he was in the ER over the weekend which caused him to worry about independence with brother. Patient/Patient caregiver requests focus on independence with meal prep in case of emergency    OBJECTIVE    10 min Therapeutic Exercise:  [] See flow sheet :   Rationale: increase ROM and increase strength to improve the patients ability to     Soft Theraputty: Manipulated with B Hands to reveal/remove 1/4 in pegs 10/10    10 min Therapeutic Activity:  []  See flow sheet :   Rationale:  Patient/Caregiver Education   to improve the patients ability to practice safe home care tasks, increase education on diagnosis and building strength/endurance     Pt/Caregiver educational packet administered on safety with strength building with Myotonic Muscular Dystrophy.     Activities educational handout provided on home care/self care tasks to help with building strength, ROM and endurance    22 min Self Care/Home Management: Adaptive Strategies   Rationale:  Patient Education   to improve the patients ability to perform functional home and self care tasks with increased independence and ease      Introduction to adaptive strategies/equipment for improved safety in kitchen- discussed electronic can opener, heat glove, ect     Discussed check list for emergency if patient were to be home alone unexpectedly    Introduced reacher to patient/patient caregiver with patient able to demonstrate use      With   [] TE   [] TA   [] neuro   [] other: Patient Education: [x] Review HEP    [] Progressed/Changed HEP based on:   [] positioning   [] body mechanics   [] transfers   [] heat/ice application   [] Splint wear/care   [] Sensory re-education   [] scar management      [] other:            Other Objective/Functional Measures:     Slow pace with theraputty  Able to manipulate reacher with no cueing provided        Pain Level (0-10 scale) post treatment: 0/10    ASSESSMENT/Changes in Function: fatigue noted on this day, patient eager to regain independence, New goals added (with OTR collaboration) to reflect patient/caregiver request    Patient will continue to benefit from skilled OT services to modify and progress therapeutic interventions, address ROM deficits, address strength deficits, and instruct in home and community integration to attain remaining goals. []  See Plan of Care  []  See progress note/recertification  []  See Discharge Summary         Progress towards goals / Updated goals:  1. Caregiver to be familiar with adaptive equipment to ease bathing. Status at PN 7/22/2022: Caregiver reports using shower chair during bathing task. Caregiver reports continuing to use dycem. Goal not met. Current Status (7/29/2022): Goal Metv     2. Patient will be independent in home exercise program for UE strengthening within limits of diagnosis. Status at PN 7/22/2022: Caregiver reports that they have not been able to do HEP with theraputty due to family visiting and personal obligations. Goal not met. 3.  Patient will be minimal assist for bathing for safety. Status at PN 7/22/2022: Caregiver reports continuing to help pt in and out of the tub (Mod A). Pt has shower chair.  Caregiver reports that he has used towel under pt arm for support and guidance during assistance for transfer. Continue to address. Current Status (7/29/2022): Goal Met, Patient performing 85% of bathing tasks per caregiver     4. Patient will be able to participate in desired activities incorporating goals of improving strength. Status at  7/22/2022: Caregiver reports that pt has been swimming at the pool and playing football. Continue to address. Added Goals:    5. Patient and caregiver will be familiar with adaptive strategies and devices/equipment for improved safety and independence with meal prep. 6. Patient will be able to complete simple meal prep with Modified independence for improved independence with self care.      PLAN  []  Upgrade activities as tolerated     [x]  Continue plan of care  []  Update interventions per flow sheet       []  Discharge due to:_  []  Other:_      REMA Suresh  8/3/2022  9:11 AM        Future Appointments   Date Time Provider Luke Doe   8/3/2022  9:45 AM Ana Albrecht PTA MMCPTPB [facility]   8/3/2022 10:30 AM Aspen Amanda BWWZUXC [facility]   8/5/2022 10:30 AM Aspen Amanda LTGBGHZ [facility]   8/5/2022 11:15 AM Brannon Vanessa, PTA MMCPTPB [facility]   8/9/2022 12:45 PM Dena Cano, PT [id number] [facility]   8/9/2022  1:30 PM Aspen Amanda BEHANQE [facility]   8/12/2022 11:15 AM Christina Beckman, PT MMCPTPB [facility]   8/12/2022 12:00 PM Monica Velazquez, OT MMCPTPB [facility]   8/26/2022 11:15 AM Teena Mena MD HVFP BS AMB   9/21/2022  3:00 PM PPA SPIROMETRY BSPSC BS AMB

## 2022-08-05 ENCOUNTER — HOSPITAL ENCOUNTER (OUTPATIENT)
Dept: PHYSICAL THERAPY | Age: 47
Discharge: HOME | End: 2022-08-05
Payer: COMMERCIAL

## 2022-08-05 PROCEDURE — 97535 SELF CARE MNGMENT TRAINING: CPT

## 2022-08-05 PROCEDURE — 97530 THERAPEUTIC ACTIVITIES: CPT

## 2022-08-05 PROCEDURE — 97112 NEUROMUSCULAR REEDUCATION: CPT

## 2022-08-05 PROCEDURE — 97110 THERAPEUTIC EXERCISES: CPT

## 2022-08-05 NOTE — PROGRESS NOTES
PT DAILY TREATMENT NOTE     Patient Name: Tripp Bonilla  Date:2022  : 1975  [x]  Patient  Verified  Payor: [address] [place], [place] [address] / Plan: [address] / Product Type: Managed Care Medicaid /    In time: 11:15  Out time: 12:00  Total Treatment Time (min): 45  Visit #: 4 of 8    Treatment Area: Generalized weakness [R53.1]    SUBJECTIVE  Pain Level (0-10 scale): 0/10  Any medication changes, allergies to medications, adverse drug reactions, diagnosis change, or new procedure performed?: [x] No    [] Yes (see summary sheet for update)  Subjective functional status/changes:   [] No changes reported  Pt reports less pain in his calf today. His brother reports less tenderness when he presses on the calf as well. He notes the pt has difficulty carrying the DVD case (~8#). Pt reports he wants to have more independence at home and be able to help more. He notes difficulty with turning around and sometimes feels his legs give away.      OBJECTIVE    15 min Therapeutic Exercise:  [x] See flow sheet :   Rationale: increase ROM and increase strength to improve the patients ability to perform ADLs    10 min Therapeutic Activity:  [x]  See flow sheet : education on bed mobility; family education regarding progression in therapy with functional independence   Rationale: increase ROM and increase strength  to improve the patients ability to ambulate      20 min Neuromuscular Re-education:  []  See flow sheet: dynamic balance and gait see below   Rationale: increase strength, improve coordination, and improve balance  to improve the patients ability to perform ADLs          With   [x] TE   [] TA   [] neuro   [] other: Patient Education: [x] Review HEP    [] Progressed/Changed HEP based on:   []    [] body mechanics   [] transfers   [] heat/ice application    [] other:      Other Objective/Functional Measures:   Educated on isometric c/s flexion into ball for gentle neck stability  Brother states he has to help hold his head for bed mobility; educated next session we would work on isometric hold to see if enough strength to perform independently    In // worked on dynamic balance:  Reaching for cones at various heights across body and ipsilateral  Added single DB 2# OH reaching and then cross body reaching with 2# to various targets  No LOB and pt well aware of keeping knees unlocked to reduce genu recurvatum    Dynamic gait:  Ambulation with stop and turns  Ambulation with head turns horizontal  Ambulation with 2 hands holding a plastic cup filled with water  Ambulation with 2 hands holding ankle weights (5#-8#) to simulate carrying DVD binder    Obstacle course consisting of:  1/2 foam step overs  Low/high hurdles  4\"-6\" step ups  BB #1  Foam pad  CGA for dynamic gait with no LOB and improved \"soft knees\" throughout    Pain Level (0-10 scale) post treatment: 0/10    ASSESSMENT/Changes in Function: Pt progressing in therapy with improved awareness of reducing genu recurvatum for better balance. He has reducing left calf pain and irritation. He continues to need focus on functional strength for balance around the house and performing independent activities such as bed mobility with decreased help from his brother. [x]  See Plan of Care  [x]  See progress note/recertification  []  See Discharge Summary         Updated Goals: to be achieved in 4 weeks:  Goal: Patient will be able to perform 1 step up onto 6inch step without UE support to increase safety with negotiating curb to get into home. Status at evaluation/last progress note: 1 UE support   2. Goal:  Patient will perform Romberg balance on foam with eyes closed without LOB for 30 seconds in order to demonstrate improve safety at home. Status at evaluation/last progress note: 2 seconds  Current Status: gait belt and Min A for balance (7/29/22)   3.    Goal:  Patient will be able to ambulate 100 feet with SBA without instances of instability to increase safety with home navigation. Status at evaluation/last progress note: CGA   4. Goal: Patient will perform 10 standing heel raises in order to demonstrate improving ankle strength for increase ease of ambulation.    Status at evaluation/last progress note: unable to perform   Current Status: unable to perform in standing with BUE support (7/29/22)    PLAN  [x]  Upgrade activities as tolerated     [x]  Continue plan of care  []  Update interventions per flow sheet       []  Discharge due to:_  []  Other:_      Marizol Cervantes, VALENTE 8/5/2022  3:00 PM    Future Appointments   Date Time Provider Luke Doe   8/5/2022 10:30 AM Nirali Peto [id number] [facility]   8/5/2022 11:15 AM Elder Farias, PTA MMCPTPB [facility]   8/9/2022 12:45 PM Annetta Harris, PT [id number] [facility]   8/9/2022  1:30 PM Nirali Peto [id number] [facility]   8/12/2022 11:15 AM Sabrina Mayo, PT MMCPTPB [facility]   8/12/2022 12:00 PM Magy Cuba, OT MMCPTPB [facility]   8/26/2022 11:15 AM MD SABRINA TapiaFP BS AMB   9/21/2022  3:00 PM PPA SPIROMETRY BSPSC BS AMB

## 2022-08-05 NOTE — PROGRESS NOTES
OT DAILY TREATMENT NOTE 10-18    Patient Name: Criselda Miramontes  Date:2022  : 1975  [x]  Patient  Verified  Payor: [address] [place] [address] Road / Plan: [address] / Product Type: Managed Care Medicaid /    In time:1030  Out time:1115  Total Treatment Time (min): 45  Visit #: 4 of 8      Treatment Area: Weakness of right upper extremity [R29.898]  Weakness of left upper extremity [R29.898]    SUBJECTIVE  Pain Level (0-10 scale): 0/10  Any medication changes, allergies to medications, adverse drug reactions, diagnosis change, or new procedure performed?: [x] No    [] Yes (see summary sheet for update)  Subjective functional status/changes:   [] No changes reported  I want to do more stuff.     OBJECTIVE    10 min Therapeutic Exercise:  [] See flow sheet :   Rationale: increase ROM and increase strength to improve the patients ability to     Yellow Therabar: 3 ways: 10x   Review of Putty HEP     12 min Therapeutic Activity:  []  See flow sheet :   Rationale: increase ROM and improve coordination  to improve the patients ability to manipulate small items     Grooved Pegs: Pieces placed using B Hands, removed digit to palm     23 min Self Care/Home Management: Kitchen    Rationale: increase ROM, increase strength, and improve coordination  to improve the patients ability to perform meal prep    Kitchen Task: Patient tasked with the following:   - Reaching into cabinet to retrieve bowl   - Opening tuna fish can- opened with electronic can opener- able to line up independently    - Draining tuna can over sink- cuing for protective/safety awareness with sharp edges   - transferring tuna from can to bowl using fork   - opened damico jar using B Hands and Dycem   - transferred damico to bowl using spoon   - Mixed tuna and damico in stand at counter   - Used B Hands with pepper     - Washed hands at sink         With   [] TE   [] TA   [] neuro   [] other: Patient Education: [x] Review HEP [] Progressed/Changed HEP based on:   [] positioning   [] body mechanics   [] transfers   [] heat/ice application   [] Splint wear/care   [] Sensory re-education   [] scar management      [] other:            Other Objective/Functional Measures:     SBA for safety overall with kitchen task while in stand - 2 momentary LOB with patient able to recover        Pain Level (0-10 scale) post treatment: 0/10    ASSESSMENT/Changes in Function: SBA for kitchen tasks in stand only, good ability to incorporate adaptive strategies with food prep     Patient will continue to benefit from skilled OT services to modify and progress therapeutic interventions, address ROM deficits, address strength deficits, and instruct in home and community integration to attain remaining goals. []  See Plan of Care  []  See progress note/recertification  []  See Discharge Summary         Progress towards goals / Updated goals:  1. Caregiver to be familiar with adaptive equipment to ease bathing. Status at PN 7/22/2022: Caregiver reports using shower chair during bathing task. Caregiver reports continuing to use dycem. Goal not met. Current Status (7/29/2022): Goal Metv     2. Patient will be independent in home exercise program for UE strengthening within limits of diagnosis. Status at PN 7/22/2022: Caregiver reports that they have not been able to do HEP with theraputty due to family visiting and personal obligations. Goal not met. 3.  Patient will be minimal assist for bathing for safety. Status at PN 7/22/2022: Caregiver reports continuing to help pt in and out of the tub (Mod A). Pt has shower chair. Caregiver reports that he has used towel under pt arm for support and guidance during assistance for transfer. Continue to address. Current Status (7/29/2022): Goal Met, Patient performing 85% of bathing tasks per caregiver     4.   Patient will be able to participate in desired activities incorporating goals of improving strength. Status at PN 7/22/2022: Caregiver reports that pt has been swimming at the pool and playing football. Continue to address. Added Goals:     5. Patient and caregiver will be familiar with adaptive strategies and devices/equipment for improved safety and independence with meal prep. Current Status (8/5/2022): Progressing, able to use dycem, introduction to electronic can opener, discussed options for table level kitchen meal prep      6. Patient will be able to complete simple meal prep with Modified independence for improved independence with self care.    Current Status (8/5/2022): SBA for safety in stand/functional ambulation     PLAN  []  Upgrade activities as tolerated     [x]  Continue plan of care  []  Update interventions per flow sheet       []  Discharge due to:_  []  Other:_      REMA Zhang  8/5/2022  8:34 AM        Future Appointments   Date Time Provider Luke Doe   8/5/2022 10:30 AM Gaurang Modi [id number] [facility]   8/5/2022 11:15 AM Job Mckeon, PTA MMCPTPB [facility]   8/9/2022 12:45 PM Joan Larson, PT [id number] [facility]   8/9/2022  1:30 PM Gaurang Modi [id number] [facility]   8/12/2022 11:15 AM Kenny Chaudhry, PT MMCPTPB [facility]   8/12/2022 12:00 PM Mallissa Burn, OT MMCPTPB [facility]   8/26/2022 11:15 AM Armaan Prado MD HVFP BS AMB   9/21/2022  3:00 PM PPA SPIROMETRY BSPSC BS AMB

## 2022-08-08 NOTE — TELEPHONE ENCOUNTER
MD Jake Woodruff  Caller: Unspecified (1 week ago,  1:11 PM)  I have completed form with my best ability and brother can  the form. Stephanie Hope with patient's brother, Carlos Fernandez (HIPAA verified-two patient identifiers verified) to advise form is ready for pick-up at [facility] . He verbalized understanding.

## 2022-08-09 ENCOUNTER — HOSPITAL ENCOUNTER (OUTPATIENT)
Dept: PHYSICAL THERAPY | Age: 47
Discharge: HOME | End: 2022-08-09
Payer: COMMERCIAL

## 2022-08-09 PROCEDURE — 97535 SELF CARE MNGMENT TRAINING: CPT

## 2022-08-09 PROCEDURE — 97110 THERAPEUTIC EXERCISES: CPT

## 2022-08-09 PROCEDURE — 97112 NEUROMUSCULAR REEDUCATION: CPT

## 2022-08-09 PROCEDURE — 97530 THERAPEUTIC ACTIVITIES: CPT

## 2022-08-09 NOTE — PROGRESS NOTES
OT DAILY TREATMENT NOTE 10-18    Patient Name: Lyle Ramsey  Date:2022  : 1975  [x]  Patient  Verified  [facility] [address] / Plan: [address] / Product Type: Managed Care Medicaid /    In time:130  Out time:215  Total Treatment Time (min): 45  Visit #: 2 of 8    Treatment Area: Weakness of right upper extremity [R29.898]  Weakness of left upper extremity [R29.898]    SUBJECTIVE  Pain Level (0-10 scale): 0/10  Any medication changes, allergies to medications, adverse drug reactions, diagnosis change, or new procedure performed?: [x] No    [] Yes (see summary sheet for update)  Subjective functional status/changes:   [] No changes reported  I didn't try cooking this weekend     OBJECTIVE    15 min Therapeutic Exercise:  [] See flow sheet :   Rationale: increase ROM and increase strength to improve the patients ability to , pinch     Yellow Therabar: 3 ways: 10x   Soft Theraputty: Manipulated with B Hands to reveal/remove 1/4 in pegs 10/10     10 min Therapeutic Activity:  []  See flow sheet :   Rationale: increase ROM and improve coordination  to improve the patients ability to manipulate small items     Perfection: untimed, pieces placed with right hand     20 min Self Care/Home Management: button/unbutton   Rationale: increase ROM, increase strength, and improve coordination  to improve the patients ability to perform self care tasks     Button/Unbutton: unbuttoned at table level using B Hands, pt used smaller button hook and built up handle button hook to perform buttoning with good success.  Information administered to brother on purchasing options/information     Zipper: Pt used B Hands to perform zip/unzip at table level, used button hook to pull zipper     With   [] TE   [] TA   [] neuro   [] other: Patient Education: [x] Review HEP    [] Progressed/Changed HEP based on:   [] positioning   [] body mechanics   [] transfers   [] heat/ice application   [] Splint wear/care   [] Sensory re-education   [] scar management      [] other:            Other Objective/Functional Measures:     Good success with button hook        Pain Level (0-10 scale) post treatment: 0/10    ASSESSMENT/Changes in Function: coordination improving    Patient will continue to benefit from skilled OT services to modify and progress therapeutic interventions, address ROM deficits, address strength deficits, and instruct in home and community integration to attain remaining goals. []  See Plan of Care  []  See progress note/recertification  []  See Discharge Summary         Progress towards goals / Updated goals:  1. Caregiver to be familiar with adaptive equipment to ease bathing. Status at PN 7/22/2022: Caregiver reports using shower chair during bathing task. Caregiver reports continuing to use dycem. Goal not met. 2. Patient will be independent in home exercise program for UE strengthening within limits of diagnosis. Status at PN 7/22/2022: Caregiver reports that they have not been able to do HEP with theraputty due to family visiting and personal obligations. Goal not met. 3.  Patient will be minimal assist for bathing for safety. Status at PN 7/22/2022: Caregiver reports continuing to help pt in and out of the tub (Mod A). Pt has shower chair. Caregiver reports that he has used towel under pt arm for support and guidance during assistance for transfer. Continue to address. 4.  Patient will be able to participate in desired activities incorporating goals of improving strength. Status at PN 7/22/2022: Caregiver reports that pt has been swimming at the pool and playing football. Continue to address.         PLAN  []  Upgrade activities as tolerated     [x]  Continue plan of care  []  Update interventions per flow sheet       []  Discharge due to:_  []  Other:_      REMA Scott  8/9/2022  11:19 AM        Future Appointments   Date Time Provider Luke Brook   8/17/2022  3:45 PM Karina Figueroa, OTR/L MMCPTPB [facility]   8/17/2022  4:30 PM Nino Murillo, PT MMCPTPB [facility]   8/19/2022 12:45 PM Ann Marie Lewis [id number] [facility]   8/19/2022  1:30 PM Nino Murillo, PT MMCPTPB [facility]   8/24/2022  1:30 PM Karina Figueroa, OTR/L MMCPTPB [facility]   8/26/2022 11:15 AM MD SABRINA VidalFP BS AMB   8/26/2022  1:30 PM Ann Marie Lewis [id number] [facility]   8/31/2022  1:30 PM Ann Marie Lewis [id number] [facility]   9/2/2022 12:45 PM Indiana Cardenas, OT MMCPTPB [facility]   9/21/2022  3:00 PM JONATHAN YANCEY BSPSC BS AMB

## 2022-08-09 NOTE — PROGRESS NOTES
PT DAILY TREATMENT NOTE     Patient Name: Melissa Martinez  Date:2022  : 1975  [x]  Patient  Verified  Payor: [address] / Plan: [address] / Product Type: Managed Care Medicaid /    In time:12:48  Out time:1:29  Total Treatment Time (min): 41  Visit #: 3 of 8    Medicare/BCBS Only   Total Timed Codes (min):  41 1:1 Treatment Time:  41       Treatment Area: Generalized weakness [R53.1]    SUBJECTIVE  Pain Level (0-10 scale): 0/10  Any medication changes, allergies to medications, adverse drug reactions, diagnosis change, or new procedure performed?: [x] No    [] Yes (see summary sheet for update)  Subjective functional status/changes:   [] No changes reported  Pt and brother report that they would like pt to be as independent as possible. Currently, brother has to assist with supine<>sit transfers and pt does not have head control. He has an 8# DVD binder that he wants to be able to carry around the house. They had tried doing the chin tucks with a ball at home, but brother believes the ball is too big, and pt is unable to hold ball under his chin.        OBJECTIVE    12 min Therapeutic Exercise:  [x] See flow sheet : c/s interventions - see below    Rationale: increase strength, improve coordination, and increase proprioception to improve the patients ability to improve head control to maximize independence with transfers     15 min Therapeutic Activity:  [x]  See flow sheet : dynamic gait with carrying a cup of water and ball balanced on cone; carrying 8# weight to simulate carrying at home    Rationale: increase strength, improve coordination, improve balance, and increase proprioception  to improve the patients ability to improve ability/safety with carrying with household tasks      14 min Neuromuscular Re-education:  [x]  See flow sheet : obstacle course, standing balance interventions    Rationale: increase strength, improve coordination, improve balance, and increase proprioception  to improve the patients ability to reduce fall risk and improve ease/safety with daily tasks             With   [] TE   [] TA   [] neuro   [] other: Patient Education: [x] Review HEP    [] Progressed/Changed HEP based on:   [] positioning   [] body mechanics   [] transfers   [] heat/ice application    [] other:      Other Objective/Functional Measures:     Therapeutic Exercise- Cervical Progression:  Had pt perform DNF chin tuck with ball with max elevation of HOB and slowly decreased HOB to supine, 10 x 5\" hold at 4 heights with progressive lowering of HOB  Pt able to maintain chin tuck in each position    Attempted global cervical flexion with small lift from pillow. Pt unable to complete without assistance with HOB elevated ~10* from max elevation. Pt completed 10x 5\" with maxA from therapist    Therapeutic Activity - Dynamic Gait; Carrying 8# to Simulate DVD Binder:  Dynamic Gait with holding cup of water: sit to stand, forward ambulation with EO, ambulation with turns on command, ambulation with horizontal/vertical head turns    Challenged with ambulation with turns on command while holding cup of water when distracted by conversation. Challenged with sit<>stand transfer while holding cup of water. Required min-modA from therapist intermittently to maintain balance     Pt reported his hands were cold with holding cup of water and requested to discontinue     Performed walking slow marches with ball balanced on large base of cone. Challenged with slow movements to perform SLS and balancing ball simultaneously     No LOB with carrying 8# ankle weights in pillow case to simulate DVD binder. Carried 2 x 30'    Neuro Re-Education: Obstacle Course, Standing Balance Interventions   Obstacle Course: cone weaving and low ramin step over holding ball balanced on cone. Able to perform obstacle course with ball balanced on large base of cone without LOB or dropping ball.   Attempted obstacle course with ball balanced on short base of cone and pt was unable to balance ball on cone while completing obstacle course     Posterior lean and limited corrective strategies with Romberg EO/EC  Cues for core activation and to maintain pelvis over CRUZ. Continued posterior pelvis placement/trunk lean, but improvement with cuing    CGA with gait belt for all standing/gait interventions. Therapist assistance required to maintain balance from min-maxA multiple times throughout session      Pain Level (0-10 scale) post treatment: 0/10    ASSESSMENT/Changes in Function:     Pt is making slow, steady progress in therapy. He continues to be challenged with head control for supine<>sit transfers. DNF control is improving; however, global cervical flexion remains limited. He continues to demonstrate posterior lean and decreased corrective strategies with standing balance. He is improving awareness with reducing genu recurvatum but continues to demonstrate knee buckling at times likely d/t quad strength/proprioceptive deficits. Will continue to address strength, proprioception, and balance deficits in order to reduce fall risk, reduce caregiver burden, maximize independence, and improve QOL. Patient will continue to benefit from skilled PT services to modify and progress therapeutic interventions, address functional mobility deficits, address ROM deficits, address strength deficits, analyze and address soft tissue restrictions, analyze and cue movement patterns, analyze and modify body mechanics/ergonomics, assess and modify postural abnormalities, address imbalance/dizziness, and instruct in home and community integration to attain remaining goals. Progress towards goals / Updated goals:  Goal: Patient will be able to perform 1 step up onto 6inch step without UE support to increase safety with negotiating curb to get into home. Status at evaluation/last progress note: 1 UE support   2. Goal:  Patient will perform Romberg balance on foam with eyes closed without LOB for 30 seconds in order to demonstrate improve safety at home. Status at evaluation/last progress note: 2 seconds  Current Status: gait belt and Min A for balance (7/29/22) No Change 8/9/22  3. Goal:  Patient will be able to ambulate 100 feet with SBA without instances of instability to increase safety with home navigation. Status at evaluation/last progress note: CGA   4. Goal: Patient will perform 10 standing heel raises in order to demonstrate improving ankle strength for increase ease of ambulation.    Status at evaluation/last progress note: unable to perform   Current Status: unable to perform in standing with BUE support (7/29/22)       PLAN  [x]  Upgrade activities as tolerated     [x]  Continue plan of care  []  Update interventions per flow sheet       []  Discharge due to:_  []  Other:_      Sage Reyna, PT 8/9/2022  1:19 PM    Future Appointments   Date Time Provider Luke Caballeroi   8/9/2022  1:30 PM Geeta CHEN [facility]   8/12/2022 11:15 AM Ronni Schuster PT MMCPTPB [facility]   8/12/2022 12:00 PM Helen Keane OT MMCPTPB [facility]   8/26/2022 11:15 AM MD TARA Osborne BS AMB   9/21/2022  3:00 PM PPA SPIROMETRY BSPSC BS AMB

## 2022-08-12 ENCOUNTER — APPOINTMENT (OUTPATIENT)
Dept: PHYSICAL THERAPY | Age: 47
End: 2022-08-12
Payer: COMMERCIAL

## 2022-08-17 ENCOUNTER — HOSPITAL ENCOUNTER (OUTPATIENT)
Dept: PHYSICAL THERAPY | Age: 47
Discharge: HOME | End: 2022-08-17
Payer: COMMERCIAL

## 2022-08-17 PROCEDURE — 97530 THERAPEUTIC ACTIVITIES: CPT

## 2022-08-17 PROCEDURE — 97110 THERAPEUTIC EXERCISES: CPT

## 2022-08-17 PROCEDURE — 97112 NEUROMUSCULAR REEDUCATION: CPT

## 2022-08-17 PROCEDURE — 97535 SELF CARE MNGMENT TRAINING: CPT

## 2022-08-17 NOTE — PROGRESS NOTES
OT DAILY TREATMENT NOTE     Patient Name: Melissa Martinez  Date:2022  : 1975  [x]  Patient  Verified  Payor: [address] [place] [address] Road / Plan: [address] / Product Type: Managed Care Medicaid /    In time:1245  Out time:130  Total Treatment Time (min): 45  Visit #: 6 of 8      Treatment Area: Weakness of right upper extremity [R29.898]  Weakness of left upper extremity [R29.898]    SUBJECTIVE  Pain Level (0-10 scale): 0/10  Any medication changes, allergies to medications, adverse drug reactions, diagnosis change, or new procedure performed?: [x] No    [] Yes (see summary sheet for update)  Subjective functional status/changes:   [] No changes reported  Still trouble putting shirt on  Trouble tying shoes tight enough  Drops soap when bathing  Using shower chiar and doing well  Help stepping over tub    OBJECTIVE          20 min Therapeutic Exercise:  [] See flow sheet :   Rationale: increase strength to improve the patients ability to grasp  Recheck  pinch  UE HEP with 1# cuff weights x 10    15 min Therapeutic Activity:  []  See flow sheet :   Rationale: increase ROM and increase strength  to improve the patients ability to reach  Reaching activity in standing to place 1 inch pegs  Recheck 9 hole peg     10 min Self Care/Home Management: task modifications   Rationale:  safety   to improve the patients ability to perform tasks independently  Review of adaptive equipment of bathing (bath mitt with soap pocket)  Reviewed current status for self care      With   [] TE   [] TA   [] neuro   [] other: Patient Education: [x] Review HEP    [] Progressed/Changed HEP based on: goals and progress  [] positioning   [] body mechanics   [] transfers   [] heat/ice application   [] Splint wear/care   [] Sensory re-education   [] scar management      [x] other:bath mitt with soap            Other Objective/Functional Measures:    Measurements: Taken with Albert Dynamometer, in Lbs Level 2 4/12 8/17 Change     Right 10 12 +2     Left 10 12 +2            Pinch Measurements: Taken with Pinch Gauge, in Lbs   (hand) 4/12 8/17 Change   3 pt      Right 4 4 0   Left  4 4 0                 Lateral      Right 4 4 0   Left 4 4 0                 Tip      Right 6 4 -2   Left 4 4 0                          9 hole right 22 (Was 25)  Left  26 (Was 26)  Pain Level (0-10 scale) post treatment: 0/10    ASSESSMENT/Changes in Function: improved independence in self care, improved right hand fine motor speed    Patient will continue to benefit from skilled OT services to modify and progress therapeutic interventions, address strength deficits, and instruct in home and community integration to attain remaining goals. []  See Plan of Care  []  See progress note/recertification  []  See Discharge Summary         Progress towards goals / Updated goals:  1. Caregiver to be familiar with adaptive equipment to ease bathing. Status at PN 7/22/2022: Caregiver reports using shower chair during bathing task. Caregiver reports continuing to use dycem. Goal not met. Current status: Progressing, discussed bath mitt as addition     2. Patient will be independent in home exercise program for UE strengthening within limits of diagnosis. Status at PN 7/22/2022: Caregiver reports that they have not been able to do HEP with theraputty due to family visiting and personal obligations. Goal not met. Current status: Added general UE s.  trengthening with 1#.  Compliant with putty at home. Progressing            3. Patient will be minimal assist for bathing for safety. Status at PN 7/22/2022: Caregiver reports continuing to help pt in and out of the tub (Mod A). Pt has shower chair. Caregiver reports that he has used towel under pt arm for support and guidance during assistance for transfer. Continue to address. Current status;  Able to bathe with supervision but requires assist for transfer to shower chair due to balance 4.  Patient will be able to participate in desired activities incorporating goals of improving strength. Status at PN 7/22/2022: Caregiver reports that pt has been swimming at the pool and playing football. Continue to address. Current status;  Patient and brother participating in games at home    PLAN  []  Upgrade activities as tolerated     [x]  Continue plan of care  []  Update interventions per flow sheet       []  Discharge due to:_  []  Other:_      Mani Huffman, OTR/L 8/17/2022  12:44 PM    Future Appointments   Date Time Provider Luke Brook   8/17/2022 12:45 PM Rodrigo Darling OTR/L [facility]   8/17/2022  1:30 PM Arsh Mack PTA [facility]   8/19/2022 12:45 PM Sherman Ch BSKELGF [facility]   8/19/2022  1:30 PM Nino Mcnair, PT [facility]   8/24/2022 12:00 PM Sherman Ch BPWOBKR [facility]   8/26/2022 11:15 AM Michelle Lemos MD HVFP BS AMB   8/26/2022  1:30 PM Sherman Ch ZCHFTCI [facility]   8/31/2022  1:30 PM Sherman Ch AEGLGEP [facility]   9/2/2022 12:45 PM Marissa Oconnor, OT [facility]   9/21/2022  3:00 PM PPA SPIROMETRY BSPSC BS AMB

## 2022-08-17 NOTE — PROGRESS NOTES
[facility]  [address] [place] [address]  [phone] fax    Occupational Therapy Progress Note  Patient name: Meli Ross Start of Care: 22   Referral source: Oleg Aguiar MD : 1975   Medical/Treatment Diagnosis: Weakness of right upper extremity [R29.898]  Weakness of left upper extremity [R29.898]  Payor: [address] [place] [address] Road / Plan: [address] / Product Type: Managed Care Medicaid /  Onset Date:Birth     Prior Hospitalization: see medical history Provider#: [id number]   Medications: Verified on Patient Summary List    Comorbidities: myotonic muscular dystrophy, hearing impairment  Prior Level of Function:I self care  Visits from Start of Care: 11   Missed Visits: 0    Established Goals:         Excellent           Good         Limited           None  [] Increased ROM   []  []  []  []  [] Increased Strength  []  []  []  []  [] Increased Mobility  []  []  []  []   [] Decreased Pain   []  []  []  []  [] Decreased Swelling  []  []  []  []  [x] Increased Fine Motor Skills []  []  [x]  []  [] Increased ADL [place] []  [x]  []  []    Key Functional Changes: Improved fine motor speed right. Other measures near equal as anticipated with diagnosis. Prior goals and progress:  1. Caregiver to be familiar with adaptive equipment to ease bathing. Status at PN 2022: Caregiver reports using shower chair during bathing task. Caregiver reports continuing to use dycem. Goal not met. Current status: Progressing, discussed bath mitt as addition     2. Patient will be independent in home exercise program for UE strengthening within limits of diagnosis. Status at PN 2022: Caregiver reports that they have not been able to do HEP with theraputty due to family visiting and personal obligations. Goal not met. Current status: Added general UE s.  trengthening with 1#.  Compliant with putty at home.   Progressing 3.  Patient will be minimal assist for bathing for safety. Status at PN 7/22/2022: Caregiver reports continuing to help pt in and out of the tub (Mod A). Pt has shower chair. Caregiver reports that he has used towel under pt arm for support and guidance during assistance for transfer. Continue to address. Current status; Able to bathe with supervision but requires assist for transfer to shower chair due to balance     4. Patient will be able to participate in desired activities incorporating goals of improving strength. Status at PN 7/22/2022: Caregiver reports that pt has been swimming at the pool and playing football. Continue to address. Current status; Patient and brother participating in games at home. Met    Updated Goals: to be achieved in 4 weeks:  .Goal; Caregiver to be familiar with adaptive equipment to ease bathing. Current status: Progressing, discussed bath mitt as addition     Goal: Patient will be independent in home exercise program for UE strengthening within limits of diagnosis. Current status: Added general UE s.  trengthening with 1#.  Compliant with putty at home. Progressing            Goal  Patient will be minimal assist for bathing for safety. Current status;  Able to bathe with supervision but requires assist for transfer to shower chair due to balance     Goal: Patient will don t shirt without assist.    Goal: Finalize home program to maximize strength within limits of disability  ASSESSMENT/RECOMMENDATIONS:  [x]Continue therapy per initial plan/protocol at a frequency of  2 x per week for 4 weeks  []Continue therapy with the following recommended changes:_____________________      _____________________________________________________________________  []Discontinue therapy progressing towards or have reached established goals  []Discontinue therapy due to lack of appreciable progress towards goals  []Discontinue therapy due to lack of attendance or compliance  []Await Physician's recommendations/decisions regarding therapy  []Other:________________________________________________________________    Thank you for this referral.   TIFFANIE Rice 8/17/2022 1:46 PM  NOTE TO PHYSICIAN:  PLEASE COMPLETE THE ORDERS BELOW AND   FAX TO [facility]: ([phone]  If you are unable to process this request in 24 hours please contact our office: [phone]    I have read the above report and request that my patient continue as recommended. I have read the above report and request that my patient continue therapy with the following changes/special instructions:________________________________________________________  I have read the above report and request that my patient be discharged from therapy.     [facility] Signature:____________Date:_________TIME:________     Tom Higginbotham MD  ** Signature, Date and Time must be completed for valid certification **

## 2022-08-17 NOTE — PROGRESS NOTES
PT DAILY TREATMENT NOTE     Patient Name: Arsh Banuelos  Date:2022  : 1975  [x]  Patient  Verified  Payor: [address] / Plan: [address] / Product Type: Managed Care Medicaid /    In time: 1:32  Out time: 2:12  Total Treatment Time (min): 40  Visit #: 4 of 8    Medicare/BCBS Only   Total Timed Codes (min): 40 1:1 Treatment Time: 40       Treatment Area: Generalized weakness [R53.1]    SUBJECTIVE  Pain Level (0-10 scale): 0/10  Any medication changes, allergies to medications, adverse drug reactions, diagnosis change, or new procedure performed?: [x] No    [] Yes (see summary sheet for update)  Subjective functional status/changes:   [] No changes reported  Pt and brother reports appt is next Friday, it is still swollen but not pain, tender if you touch it. Pt later reports he has pain in his hip flexors and proximal quads when he sits and when he first stands but it goes away immediately when he ambulates.     OBJECTIVE    8 min Therapeutic Exercise:  [x] See flow sheet : c/s interventions - see below    Rationale: increase strength, improve coordination, and increase proprioception to improve the patients ability to improve head control to maximize independence with transfers     17 min Therapeutic Activity:  [x]  See flow sheet : dynamic gait with carrying a cup of water and ball balanced on cone; carrying 8# weight to simulate carrying at home; sit to stands    Rationale: increase strength, improve coordination, improve balance, and increase proprioception  to improve the patients ability to improve ability/safety with carrying with household tasks      15 min Neuromuscular Re-education:  [x]  See flow sheet : obstacle course, standing balance interventions    Rationale: increase strength, improve coordination, improve balance, and increase proprioception  to improve the patients ability to reduce fall risk and improve ease/safety with daily tasks With   [] TE   [] TA   [] neuro   [] other: Patient Education: [x] Review HEP    [] Progressed/Changed HEP based on:   [] positioning   [] body mechanics   [] transfers   [] heat/ice application    [] other:      Other Objective/Functional Measures:     Therapeutic Exercise- Cervical Progression:  Global cervical flexion with small lift from pillow. Pt unable to complete without assistance with HOB elevated ~10* from max elevation. Pt completed 10x 5\" with maxA from therapist    Therapeutic Activity - Dynamic Gait; Carrying 8# to Simulate DVD Binder:  Dynamic gait:  Ambulation with stop and turns  Ambulation with head turns horizontal  Ambulation with 2 hands holding a plastic cup filled with water, ball balanced on cone  Ambulation with 2 hands holding ankle weights (5#-8#) to simulate carrying DVD binder, close to CRUZ and away from CRUZ  Tandem walking    In // worked on dynamic balance:  Reaching for cones at various heights across body and ipsilateral  Added single DB 2# OH reaching and then cross body reaching with 2# to various targets      Performed walking slow marches with ball balanced on large base of cone. No LOB with carrying 8# ankle weights in pillow case to simulate DVD binder. Carried 2 x 30'    Neuro Re-Education: Obstacle Course, Standing Balance Interventions   Obstacle Course: cone weaving and low ramin step over holding ball balanced on cone. Able to perform obstacle course with ball balanced on large base of cone without LOB or dropping ball. Attempted obstacle course with ball balanced on short base of cone and pt was unable to balance ball on cone while completing obstacle course     Obstacle course consisting of:  1/2 foam step overs  Low/high hurdles  4\"\" step ups  BB #1  Foam pad      CGA with gait belt for all standing/gait interventions.   Therapist assistance required to maintain balance from min-maxA multiple times throughout session      Pain Level (0-10 scale) post treatment: 0/10    ASSESSMENT/Changes in Function:   Pt is making slow, steady progress in therapy. He continues to require close S/CGA d/t sudden knee buckling with static and dynamic activities. Minimal cuing for genu recurvatum today. He remains challenged with ambulation with head turns as well as turning, demonstrating scissoring gait pattern. Educated pt and brother on slower smaller steps to prevent scissoring while turning. Discussed with pt and brother that this therapist would discuss pt's subjective complaint of hip flexor/quad pain with pt's current PT team to assess what can be done. Discussed possible tightness vs pain as pt reports immediate relief with ambulation. Poor balance with SLS requiring 1 UE support on bars. Patient will continue to benefit from skilled PT services to modify and progress therapeutic interventions, address functional mobility deficits, address ROM deficits, address strength deficits, analyze and address soft tissue restrictions, analyze and cue movement patterns, analyze and modify body mechanics/ergonomics, assess and modify postural abnormalities, address imbalance/dizziness, and instruct in home and community integration to attain remaining goals. Progress towards goals / Updated goals:  Goal: Patient will be able to perform 1 step up onto 6inch step without UE support to increase safety with negotiating curb to get into home. Status at evaluation/last progress note: 1 UE support   2. Goal:  Patient will perform Romberg balance on foam with eyes closed without LOB for 30 seconds in order to demonstrate improve safety at home. Status at evaluation/last progress note: 2 seconds  Current Status: gait belt and Min A for balance (7/29/22) No Change 8/9/22  3. Goal:  Patient will be able to ambulate 100 feet with SBA without instances of instability to increase safety with home navigation. Status at evaluation/last progress note: CGA   4.    Goal: Patient will perform 10 standing heel raises in order to demonstrate improving ankle strength for increase ease of ambulation.    Status at evaluation/last progress note: unable to perform   Current Status: unable to perform in standing with BUE support (7/29/22)       PLAN  [x]  Upgrade activities as tolerated     [x]  Continue plan of care  []  Update interventions per flow sheet       []  Discharge due to:_  [x]  Other: PN due Greta PTA 8/17/2022  2:12 PM    Future Appointments   Date Time Provider Luke Doe   8/17/2022 12:45 PM Pato Yu OTR/L [facility]   8/17/2022  1:30 PM Nellie Duff PTA [facility]   8/19/2022 12:45 PM Davjavy Barrios CMQYCXL [facility]   8/19/2022  1:30 PM Nino Marshall PT [facility]   8/24/2022 12:00 PM Davied Denis OCZZVCP [facility]   8/26/2022 11:15 AM Williams De La Rosa MD HVFP BS AMB   8/26/2022  1:30 PM Maria A Barrios OXULAVI [facility]   8/31/2022  1:30 PM Maria A Barrios CWNBELM [facility]   9/2/2022 12:45 PM Hussein Oconnor Ro, OT [facility]   9/21/2022  3:00 PM PPA SPIROMETRY BSPSC BS AMB

## 2022-08-19 ENCOUNTER — HOSPITAL ENCOUNTER (OUTPATIENT)
Dept: PHYSICAL THERAPY | Age: 47
Discharge: HOME | End: 2022-08-19
Payer: COMMERCIAL

## 2022-08-19 PROCEDURE — 97112 NEUROMUSCULAR REEDUCATION: CPT

## 2022-08-19 PROCEDURE — 97535 SELF CARE MNGMENT TRAINING: CPT

## 2022-08-19 PROCEDURE — 97530 THERAPEUTIC ACTIVITIES: CPT

## 2022-08-19 PROCEDURE — 97110 THERAPEUTIC EXERCISES: CPT

## 2022-08-19 NOTE — PROGRESS NOTES
OT DAILY TREATMENT NOTE 10-18    Patient Name: Noemi Walter  Date:2022  : 1975  [x]  Patient  Verified  Payor: [address] [place] [address] Road / Plan: [address] / Product Type: Managed Care Medicaid /    In time:1245  Out time:130  Total Treatment Time (min): 45  Visit #: 1 of 8      Treatment Area: Weakness of right upper extremity [R29.898]  Weakness of left upper extremity [R29.898]    SUBJECTIVE  Pain Level (0-10 scale): 0/10  Any medication changes, allergies to medications, adverse drug reactions, diagnosis change, or new procedure performed?: [x] No    [] Yes (see summary sheet for update)  Subjective functional status/changes:   [] No changes reported  \"Sometimes I can't tell it is tender\"    OBJECTIVE    10 min Therapeutic Exercise:  [] See flow sheet :   Rationale: increase ROM and increase strength to improve the patients ability to , pinch     Soft theraputty: Manipulated with B Hands to reveal/remove 1/4 in pegs 10/10     15 min Therapeutic Activity:  []  See flow sheet :   Rationale: increase ROM and improve coordination  to improve the patients ability to manipulate small items    Perfection: B hands, untimed   Dowels+Washers: B Hands, alternating    20 min Self Care/Home Management: Upper Body Dressing   Rationale:  Adaptive strategies   to improve the patients ability to perform self care with increased independence and ease     Tshirt- don/doff, strategies   Buttoning/unbuttoning - Table level     With   [] TE   [] TA   [] neuro   [] other: Patient Education: [x] Review HEP    [] Progressed/Changed HEP based on:   [] positioning   [] body mechanics   [] transfers   [] heat/ice application   [] Splint wear/care   [] Sensory re-education   [] scar management      [] other:            Other Objective/Functional Measures:     TShirt don/doff:Able to don independently, new strategy of takeing B UE out of sleeves, bunching up shirt around neck, and then taking over head better strategy for increased independence as demonstrated in clinic with t shirt      Button/Unbutton: missed one button    Pain Level (0-10 scale) post treatment: 0/10    ASSESSMENT/Changes in Function: able to demonstrate don/doffing of t shirt  in clinic    Patient will continue to benefit from skilled OT services to modify and progress therapeutic interventions, address ROM deficits, address strength deficits, and instruct in home and community integration to attain remaining goals. []  See Plan of Care  []  See progress note/recertification  []  See Discharge Summary         Progress towards goals / Updated goals:  .Goal; Caregiver to be familiar with adaptive equipment to ease bathing. Current status: Progressing, discussed bath mitt as addition     Goal: Patient will be independent in home exercise program for UE strengthening within limits of diagnosis. Current status: Added general UE s.  trengthening with 1#.  Compliant with putty at home. Progressing            Goal  Patient will be minimal assist for bathing for safety. Current status; Able to bathe with supervision but requires assist for transfer to shower chair due to balance     Goal: Patient will don t shirt without assist.  Current Status (8/19/2022):  Able to don/doff on this date with updated technique, check for carryover at next appointment      Goal: Finalize home program to maximize strength within limits of disability    PLAN  []  Upgrade activities as tolerated     [x]  Continue plan of care  []  Update interventions per flow sheet       []  Discharge due to:_  []  Other:_      REMA Kilpatrick  8/19/2022  10:05 AM        Future Appointments   Date Time Provider Luke Doe   8/19/2022  1:30 PM Negrito Corrales PT [facility]   8/24/2022 12:00 PM Kaitlynn Roy WMJMBTU [facility]   8/26/2022 11:15 AM MD TARA Wagner BS AMB   8/26/2022  1:30 PM Kaitlynn Roy CVYGUDA [facility]   8/31/2022  1:30 PM Shavon Luz Maria Álvarez GNKJQVP [facility]   9/2/2022 12:45 PM Anastasia, [address], OT [facility]   9/21/2022  3:00 PM PPA SPIROMETRY BSPSC BS AMB

## 2022-08-19 NOTE — PROGRESS NOTES
[facility] [place] [employer]  [address] [facility]  [phone] fax    Physical Therapy Progress Note  Patient name: Sachin Coronado Start of Care: 2022   Referral source: Vanessa Bear MD : 1975                Medical Diagnosis: Muscle weakness [M62.81]  Payor: [address] [place] [address] Road / Plan: [place] / Product Type: Managed Care Medicaid /  Onset Date: 2022                Treatment Diagnosis: Impaired gait/balance; Muscle weakness    Prior Hospitalization: see medical history Provider#: [id number]   Medications: Verified on Patient summary List    Comorbidities:Neurological disease, Myotonic Dystrophy Type 1 Back pain, GI disease, Hearing impairment, other disorders, Visual impairment-2019- cataracts surgery   Prior Level of Function: Patient lives with brother and has a home attendant with him to assist as needed. Visits from Start of Care: 12    Missed Visits: 0    Established Goals:         Excellent           Good         Limited           None  [x] Increased ROM   []  []  [x]  []  [x] Increased Strength  []  []  [x]  []  [x] Increased Mobility  []  [x]  []  []   [x] Decreased Pain   []  []  [x]  []  [] Decreased Swelling  []  []  []  []    Key Functional Changes:  pt. Is progressing slowly towards goals. He continues to have episodes of his legs giving out when walking and he has difficulty being able to tell when this is about to occur. He was able to ascend/descend 6 inch steps and curbs without UE support and only CGA. He continues to require cues to descend with left leg first. He has poor single leg stability at 1 second bilaterally and continues to have LOB with Romberg balance on foam. He also has been having difficulty with his head control during transfers and requires assistance for head support during transfers at home. He has been limited by pain and swelling in his left calf.  Left mid calf is 27 cm circumference compared to right at 24.5 cm. He also has sensitivity to light palpation over lateral calf. Wells clinical prediction rule for DVT was 0 but he was still advised to follow up with physician. Skilled PT is medically necessary in order to continue to improve balance for increased safety during ambulation and transfers at home. Updated Goals: to be achieved in 4 weeks:  Goal: Patient will perform supine to sit transfers from 45 degree inclined table without assistance in order to increase ease of transfer at home. Status at evaluation/last progress note: assistance for head support     2. Goal:  Patient will perform Romberg balance on foam with eyes closed without LOB for 30 seconds in order to demonstrate improve safety at home. Status at evaluation/last progress note: 2 episodes of LOB during 30 seconds     3. Goal:  Patient will be able to ambulate 100 feet holding 8# with SBA without instances of instability to increase safety with home navigation. Status at evaluation/last progress note: continues to ambulate with SBA     4. Goal: Patient will ambulate backwards for 30 feet without LOB or cueing in order to demonstrate improving balance for increased ease of opening doors at home.    Status at evaluation/last progress note: CGA and multiple LOB    ASSESSMENT/RECOMMENDATIONS:  [x]Continue therapy per initial plan/protocol at a frequency of  2 x per week for 4 weeks  []Continue therapy with the following recommended changes:_____________________      _____________________________________________________________________  []Discontinue therapy progressing towards or have reached established goals  []Discontinue therapy due to lack of appreciable progress towards goals  []Discontinue therapy due to lack of attendance or compliance  []Await Physician's recommendations/decisions regarding therapy  []Other:________________________________________________________________    Thank you for this referral.   Chari Vamshiheather, PT 8/19/2022 3:03 PM    NOTE TO PHYSICIAN:  PLEASE COMPLETE THE ORDERS BELOW AND   FAX TO [facility]: [phone]  If you are unable to process this request in 24 hours please contact our office: [phone]    I have read the above report and request that my patient continue as recommended. I have read the above report and request that my patient continue therapy with the following changes/special instructions:____________________________________  I have read the above report and request that my patient be discharged from therapy.     Physicians signature: ______________________________Date: ______Time:______     Evangelista Panchal MD

## 2022-08-19 NOTE — PROGRESS NOTES
PT DAILY TREATMENT NOTE     Patient Name: Ludwig Kawasaki  Date:2022  : 1975  [x]  Patient  Verified  [facility] [address] / Plan: [address] / Product Type: Managed Care Medicaid /    In time: 1:30  Out time: 2:15  Total Treatment Time (min): 45  Visit #: 7 of 8    Treatment Area: Generalized weakness [R53.1]    SUBJECTIVE  Pain Level (0-10 scale): 0/10  Any medication changes, allergies to medications, adverse drug reactions, diagnosis change, or new procedure performed?: [x] No    [] Yes (see summary sheet for update)  Subjective functional status/changes:   [] No changes reported  Pt. And brother reports continued tenderness to light palpation and swelling over left calf. They reports their biggest concern for PT is his balance. OBJECTIVE    10 min Therapeutic Exercise:  [] See flow sheet : chin tucks with manual resist, step ups   Rationale: increase ROM and increase strength to improve the patients ability to increase ease of transfers at home    20 min Therapeutic Activity:  []  See flow sheet : goal assessment, sit to stands, supine to sit transfers, curb negotiation    Rationale: increase strength, improve coordination, and improve balance  to improve the patients ability to perform transfers at home.       15 min Neuromuscular Re-education:  []  See flow sheet : obstacle course carrying 7#, holding SB, and with side stepping, backward ambulation, ambulation with quick turns, cone pick ups   Rationale: increase strength, improve coordination, and improve balance  to improve the patients ability to perform ADLs          With   [x] TE   [] TA   [] neuro   [] other: Patient Education: [x] Review HEP    [] Progressed/Changed HEP based on:   [] positioning   [] body mechanics   [] transfers   [] heat/ice application    [] other:      Other Objective/Functional Measures:   6 inch step ups: able to perform with CGA  Romberg balance on foam EC: LOB  SLS: 1 second bilaterally   Ambulation with SBA: requires CGA.  Pt. Has difficulty telling when his legs are fatiguing or want to give out  Standing heel raises: held secondary to calf pain  Instability on turning and backward ambulation noted    Pain Level (0-10 scale) post treatment: 0/10    ASSESSMENT/Changes in Function:      []  See Plan of Care  [x]  See progress note/recertification  []  See Discharge Summary         Progress towards goals / Updated goals:  See progress note    PLAN  []  Upgrade activities as tolerated     [x]  Continue plan of care  []  Update interventions per flow sheet       []  Discharge due to:_  []  Other:_      Rey Keenan, PT 8/19/2022  7:38 AM    Future Appointments   Date Time Provider Luke Doe   8/19/2022 12:45 PM [place] [id number] [facility]   8/19/2022  1:30 PM Luh Gallegos, PT MMCPTPB [facility]   8/24/2022 12:00 PM [place] [id number] [facility]   8/26/2022 11:15 AM Garth Villegas MD HVFP BS AMB   8/26/2022  1:30 PM [place] [id number] [facility]   8/31/2022  1:30 PM [place] [id number] [facility]   9/2/2022 12:45 PM Jayne Oconnor OT MMCPTPB [facility]   9/21/2022  3:00 PM PPA SPIROMETRY BSPSC BS AMB

## 2022-08-24 ENCOUNTER — HOSPITAL ENCOUNTER (OUTPATIENT)
Dept: PHYSICAL THERAPY | Age: 47
Discharge: HOME | End: 2022-08-24
Payer: COMMERCIAL

## 2022-08-24 PROCEDURE — 97535 SELF CARE MNGMENT TRAINING: CPT

## 2022-08-24 PROCEDURE — 97110 THERAPEUTIC EXERCISES: CPT

## 2022-08-24 PROCEDURE — 97530 THERAPEUTIC ACTIVITIES: CPT

## 2022-08-24 NOTE — PROGRESS NOTES
OT DAILY TREATMENT NOTE 10-18    Patient Name: Katlyn Reason  Date:2022  : 1975  [x]  Patient  Verified  Payor: [address] [place] [address] Road / Plan: [address] / Product Type: Managed Care Medicaid /    In time:1200  Out time:1240  Total Treatment Time (min): 40  Visit #: 2 of 8      Treatment Area: Weakness of right upper extremity [R29.898]  Weakness of left upper extremity [R29.898]    SUBJECTIVE  Pain Level (0-10 scale): 0/10  Any medication changes, allergies to medications, adverse drug reactions, diagnosis change, or new procedure performed?: [x] No    [] Yes (see summary sheet for update)  Subjective functional status/changes:   [] No changes reported  Patient caregiver reports patient did laundry over the weekend     OBJECTIVE    10 min Therapeutic Exercise:  [] See flow sheet :   Rationale: increase ROM and increase strength to improve the patients ability to reach,     Med Soft theraputty: Manipulated with B Hands to reveal/remove 1/4 in pegs     Yellow Therabar: 3 ways: 15x each     10 min Therapeutic Activity:  []  See flow sheet :   Rationale: increase ROM and improve coordination  to improve the patients ability to manipulate small items     Hartman Translation: picked up from table and translated palm to digit for placement into resistive bank, performed with both hands individually    20 min Self Care/Home Management: Kitchen, Adaptive, T shirt don/doff   Rationale: increase ROM, increase strength, and improve coordination  to improve the patients ability to perform ADL/IADL's with increased independence     Kitchen Activity: Reaching [employer]) into cabinets to put dishes away   T shirt: Don/Doff   Bath Kieran  Kitchen Safety    With   [] TE   [] TA   [] neuro   [] other: Patient Education: [x] Review HEP    [] Progressed/Changed HEP based on:   [] positioning   [] body mechanics   [] transfers   [] heat/ice application   [] Splint wear/care   [] Sensory re-education [] scar management      [] other:            Other Objective/Functional Measures:     SBA with [place] pullover tshirt     Pain Level (0-10 scale) post treatment: 0/10    ASSESSMENT/Changes in Function: improving carryover with strategies learned in clinic     Patient will continue to benefit from skilled OT services to modify and progress therapeutic interventions, address ROM deficits, address strength deficits, and instruct in home and community integration to attain remaining goals. []  See Plan of Care  []  See progress note/recertification  []  See Discharge Summary         Progress towards goals / Updated goals:  Goal; Caregiver to be familiar with adaptive equipment to ease bathing. Current status: Progressing, discussed bath mitt as addition     Goal: Patient will be independent in home exercise program for UE strengthening within limits of diagnosis. Current status: Added general UE s.  trengthening with 1#.  Compliant with putty at home. Progressing            Goal  Patient will be minimal assist for bathing for safety. Current status; Able to bathe with supervision but requires assist for transfer to shower chair due to balance     Goal: Patient will don t shirt without assist.  Current Status (8/19/2022):  Able to don/doff on this date with updated technique, check for carryover at next appointment      Goal: Finalize home program to maximize strength within limits of disability    PLAN  []  Upgrade activities as tolerated     [x]  Continue plan of care  []  Update interventions per flow sheet       []  Discharge due to:_  []  Other:_      Lucas Sharif ,CARDOSO/L  8/24/2022  10:49 AM        Future Appointments   Date Time Provider Luke Doe   8/24/2022 12:00 PM Guadlupe Cowboy MMCPTPB [facility]   8/26/2022 11:15 AM Veronika Erickson MD HVFP BS AMB   8/26/2022  1:30 PM Guadlupe Cowboy [id number] [facility]   8/31/2022  1:30 PM Guadlupe Cowboy [id number] [facility]   9/2/2022 12:45 PM Anastasia Dionne, OT MMCPTPB [facility]   9/21/2022  3:00 PM PPA SPIROMETRY BSPSC BS AMB

## 2022-08-26 ENCOUNTER — OFFICE VISIT (OUTPATIENT)
Dept: FAMILY MEDICINE CLINIC | Age: 47
End: 2022-08-26

## 2022-08-26 ENCOUNTER — HOSPITAL ENCOUNTER (OUTPATIENT)
Dept: GENERAL RADIOLOGY | Age: 47
Discharge: HOME | End: 2022-08-26
Attending: FAMILY MEDICINE
Payer: COMMERCIAL

## 2022-08-26 ENCOUNTER — HOSPITAL ENCOUNTER (OUTPATIENT)
Dept: PHYSICAL THERAPY | Age: 47
End: 2022-08-26
Payer: COMMERCIAL

## 2022-08-26 ENCOUNTER — HOSPITAL ENCOUNTER (OUTPATIENT)
Dept: VASCULAR SURGERY | Age: 47
Discharge: HOME | End: 2022-08-26
Attending: FAMILY MEDICINE
Payer: COMMERCIAL

## 2022-08-26 VITALS
HEIGHT: 62 IN | OXYGEN SATURATION: 97 % | HEART RATE: 69 BPM | BODY MASS INDEX: 19.69 KG/M2 | SYSTOLIC BLOOD PRESSURE: 98 MMHG | WEIGHT: 107 LBS | DIASTOLIC BLOOD PRESSURE: 70 MMHG | TEMPERATURE: 97.8 F | RESPIRATION RATE: 16 BRPM

## 2022-08-26 DIAGNOSIS — M79.605 PAIN OF LEFT LOWER EXTREMITY: ICD-10-CM

## 2022-08-26 DIAGNOSIS — M79.605 PAIN OF LEFT LOWER EXTREMITY: Primary | ICD-10-CM

## 2022-08-26 DIAGNOSIS — M79.89 LEFT LEG SWELLING: ICD-10-CM

## 2022-08-26 DIAGNOSIS — G71.00 MUSCULAR DYSTROPHY (HCC): ICD-10-CM

## 2022-08-26 DIAGNOSIS — H04.123 DRY EYES, BILATERAL: ICD-10-CM

## 2022-08-26 PROCEDURE — 93971 EXTREMITY STUDY: CPT

## 2022-08-26 PROCEDURE — 99213 OFFICE O/P EST LOW 20 MIN: CPT | Performed by: FAMILY MEDICINE

## 2022-08-26 PROCEDURE — 73590 X-RAY EXAM OF LOWER LEG: CPT

## 2022-08-26 RX ORDER — POLYETHYLENE GLYCOL 400 AND PROPYLENE GLYCOL 4; 3 MG/ML; MG/ML
2 SOLUTION/ DROPS OPHTHALMIC 4 TIMES DAILY
Qty: 30 ML | Refills: 1 | Status: SHIPPED | OUTPATIENT
Start: 2022-08-26

## 2022-08-26 NOTE — PROGRESS NOTES
HISTORY OF PRESENT ILLNESS  Arash Eng is a 42 y.o. male. HPI: Here brought by his brother with the complaint of left leg pain and swelling. Noted the left calf swelling and tenderness on direct touch. Denies any fall or injury. Has history of muscular dystrophy and currently under PT OT. Was noted by physical therapist.  Peripheral pulsations of posterior tibial palpable today. No skin changes noted. As soon as stretching to the left calf he shows discomfort. Sitting without any acute distress  No cough cold wheezing or any shortness of breath. Denies any chest pain or palpitation. Not a great historian. Visit Vitals  BP 98/70 (BP 1 Location: Left upper arm, BP Patient Position: Sitting, BP Cuff Size: Adult)   Pulse 69   Temp 97.8 °F (36.6 °C) (Temporal)   Resp 16   Ht 5' 2\" (1.575 m)   Wt 107 lb (48.5 kg)   SpO2 97%   BMI 19.57 kg/m²         ROS: See HPI    Physical Exam  Musculoskeletal:      Comments: Left lower ext: calf tenderness and swelling compare to rt lower ext. No skin changes. Tenderness present on direct pressure. Peripheral pulsations of posterior tibial present. Neurological:      Mental Status: He is alert and oriented to person, place, and time. ASSESSMENT and PLAN    ICD-10-CM ICD-9-CM    1. Pain of left lower extremity: Advised heating pad and Tylenol as needed. Obtaining lower extremity duplex and x-ray. Advised brother to call after hours M79.605 729.5 XR TIB/FIB LT      DUPLEX LOWER EXT VENOUS LEFT      CANCELED: DUPLEX LOWER EXT VENOUS LEFT      2. Dry eyes, bilateral: Having on and off discomfort and eye irritation. Sending to ophthalmology per request.  Having some vision changes according to brother H5. 123 375.15 Systane, propylene glycoL, 0.4-0.3 % drop      REFERRAL TO OPHTHALMOLOGY      3. Muscular dystrophy (HCC)  G71.00 359.1       4.  Left leg swelling  M79.89 729.81 DUPLEX LOWER EXT VENOUS LEFT      Patient's brother understood and agreed with the plan    Follow-up as scheduled or can be decided after study  Please note that this dictation was completed with [employer], the computer voice recognition software. Quite often unanticipated grammatical, syntax, homophones, and other interpretive errors are inadvertently transcribed by the computer software. Please disregard these errors. Please excuse any errors that have escaped final proofreading.

## 2022-08-26 NOTE — PROGRESS NOTES
1. Have you been to the ER, urgent care clinic since your last visit? Hospitalized since your last visit? No    2. Have you seen or consulted any other health care providers outside of the [address] [place] [address] since your last visit? Include any pap smears or colon screening. [facility] Dr. Lilly Liu [facility]astroenter[facility] LOV: 5/24/22    Chief Complaint   Patient presents with    Muscular Dystrophy    Constipation    Dry Eye    Hearing Loss     Left ear      Other     Decreased ADL and elevated LFts.  Also, wants to discuss neurology follow-up    Leg Swelling     Left leg pain and swelling    Medication Refill     Miralax    Referral Request     Referral to eye doctor for eye itching and eye discharge    Results     Dr. Lilly Liu - GI lab results - viewable in care everywhere

## 2022-08-29 ENCOUNTER — TELEPHONE (OUTPATIENT)
Dept: FAMILY MEDICINE CLINIC | Age: 47
End: 2022-08-29

## 2022-08-29 NOTE — PROGRESS NOTES
Called brother , varified patient . and discussed negative for DVT. Heating pad. Advil and tylenol alterante with food if needed. He is aware has elevated LFT so do not take more than couple of tylenol in a day or avoid.

## 2022-08-30 ENCOUNTER — APPOINTMENT (OUTPATIENT)
Dept: PHYSICAL THERAPY | Age: 47
End: 2022-08-30
Payer: COMMERCIAL

## 2022-08-31 ENCOUNTER — HOSPITAL ENCOUNTER (OUTPATIENT)
Dept: PHYSICAL THERAPY | Age: 47
Discharge: HOME | End: 2022-08-31
Payer: COMMERCIAL

## 2022-08-31 PROCEDURE — 97110 THERAPEUTIC EXERCISES: CPT

## 2022-08-31 PROCEDURE — 97530 THERAPEUTIC ACTIVITIES: CPT

## 2022-08-31 PROCEDURE — 97535 SELF CARE MNGMENT TRAINING: CPT

## 2022-08-31 NOTE — PROGRESS NOTES
OT DAILY TREATMENT NOTE 10-18    Patient Name: Meli Ross  Date:2022  : 1975  [x]  Patient  Verified  Payor: [address] [place] [address] Road / Plan: [address] / Product Type: Managed Care Medicaid /    In time:130  Out time:215  Total Treatment Time (min): 45  Visit #: 3 of 8      Treatment Area: Weakness of right upper extremity [R29.898]  Weakness of left upper extremity [R29.898]    SUBJECTIVE  Pain Level (0-10 scale): 0/10  Any medication changes, allergies to medications, adverse drug reactions, diagnosis change, or new procedure performed?: [x] No    [] Yes (see summary sheet for update)  Subjective functional status/changes:   [] No changes reported  No blood clot found during ultrasound or sonogram of Left leg     OBJECTIVE    13 min Therapeutic Exercise:  [] See flow sheet :   Rationale: increase ROM and increase strength to improve the patients ability to     Yellow Therabar: 3 ways   Med Soft theraputty: Manipulated with B Hands to reveal/remove 1/4 in pegs 10/10       14 min Therapeutic Activity:  []  See flow sheet :   Rationale: increase ROM and improve coordination  to improve the patients ability to perform self care tasks, manipulate fasteners     Grooved Pegs- placed individually alternating hands, removed using digit to palm translation with Right hand     Edema Education: administered compression for Left leg (Physical Therapy aware) Patient and caregiver education on issues to look for/when to take off    18 min Self Care/Home Management:    Rationale: increase ROM, increase strength, and improve coordination  to improve the patients ability to perform self care tasks with increased ease and independence     Pt education on elastic shoelaces, administered   Education on Magnetic Buttons   Education on use of velcro with lower body dressing/buttons with pants   Review of bath jocy- suggested to not use pocket on jocy     With   [] TE   [] TA   [] neuro   [] other: Patient Education: [x] Review HEP    [] Progressed/Changed HEP based on:   [] positioning   [] body mechanics   [] transfers   [] heat/ice application   [] Splint wear/care   [] Sensory re-education   [] scar management      [] other:            Other Objective/Functional Measures:     Don/Doff edema sleeve- Independent      Pain Level (0-10 scale) post treatment: 0/10    ASSESSMENT/Changes in Function: inconsistency with carryover with alternate strategy of doffing pullover shirt     Patient will continue to benefit from skilled OT services to modify and progress therapeutic interventions, address ROM deficits, address strength deficits, and instruct in home and community integration to attain remaining goals. []  See Plan of Care  []  See progress note/recertification  []  See Discharge Summary         Progress towards goals / Updated goals:  Goal; Caregiver to be familiar with adaptive equipment to ease bathing. Current status: Progressing, discussed bath mitt as addition     Goal: Patient will be independent in home exercise program for UE strengthening within limits of diagnosis. Current status: Added general UE s.  trengthening with 1#.  Compliant with putty at home. Progressing            Goal  Patient will be minimal assist for bathing for safety. Current status; Able to bathe with supervision but requires assist for transfer to shower chair due to balance     Goal: Patient will don t shirt without assist.  Current Status (8/19/2022):  Able to don/doff on this date with updated technique, check for carryover at next appointment      Goal: Finalize home program to maximize strength within limits of disability       PLAN  []  Upgrade activities as tolerated     [x]  Continue plan of care  []  Update interventions per flow sheet       []  Discharge due to:_  []  Other:_      REMA Suresh  8/31/2022  11:52 AM        Future Appointments   Date Time Provider Luke Doe   8/31/2022 1:30 PM Tamara Henson [id number]   9/2/2022 12:45 PM Bria Martino OT [id number] [address]   9/21/2022  3:00 PM PPA SPIROMETRY BSPSC BS AMB

## 2022-09-02 ENCOUNTER — APPOINTMENT (OUTPATIENT)
Dept: PHYSICAL THERAPY | Age: 47
End: 2022-09-02
Payer: COMMERCIAL

## 2022-09-02 NOTE — TELEPHONE ENCOUNTER
Patient advised that Systane Eye Gel has been discontinued so he will need to wait for the ophthalmology appointment. Contact info for [employer] provided.

## 2022-09-07 ENCOUNTER — APPOINTMENT (OUTPATIENT)
Dept: PHYSICAL THERAPY | Age: 47
End: 2022-09-07
Payer: COMMERCIAL

## 2022-09-13 DIAGNOSIS — G71.11 MYOTONIC DYSTROPHY, TYPE 1 (HCC): Primary | ICD-10-CM

## 2022-09-14 ENCOUNTER — TELEPHONE (OUTPATIENT)
Dept: FAMILY MEDICINE CLINIC | Age: 47
End: 2022-09-14

## 2022-09-14 DIAGNOSIS — E87.5 HYPERKALEMIA: Primary | ICD-10-CM

## 2022-09-14 NOTE — PROGRESS NOTES
Spoke with patient's brother, Bassam Fournier (HIPAA verified-two patient identifiers verified) to advise no acute findings on XR TIB/FIB left. He verbalized understanding.

## 2022-09-14 NOTE — TELEPHONE ENCOUNTER
Spoke with patient's brother, Roselyn Preston (two identifiers verified) to advise Dr. Cox received labs from [facility] that showed patient has elevated potassium. Dr. Cox would like for patient to repeat the potassium lab. Melissa Haider verbalized understanding. He is aware he may complete repeat lab at our [facility] lab. Let the lab  know he is there to complete blood work for Dr. Cox and they will pull order from the system. Melissa Haider verbalized understanding. Taiwo Lau was advised there was no acute findings on patient's XR TIB/FIB left. He verbalized understanding.

## 2022-09-16 ENCOUNTER — HOSPITAL ENCOUNTER (OUTPATIENT)
Dept: PHYSICAL THERAPY | Age: 47
Discharge: HOME | End: 2022-09-16
Payer: COMMERCIAL

## 2022-09-16 ENCOUNTER — HOSPITAL ENCOUNTER (OUTPATIENT)
Dept: PHYSICAL THERAPY | Age: 47
End: 2022-09-16
Payer: COMMERCIAL

## 2022-09-16 ENCOUNTER — TELEPHONE (OUTPATIENT)
Dept: PHYSICAL THERAPY | Age: 47
End: 2022-09-16

## 2022-09-16 PROCEDURE — 97530 THERAPEUTIC ACTIVITIES: CPT

## 2022-09-16 PROCEDURE — 97112 NEUROMUSCULAR REEDUCATION: CPT

## 2022-09-16 PROCEDURE — 97535 SELF CARE MNGMENT TRAINING: CPT

## 2022-09-16 NOTE — PROGRESS NOTES
[facility] - [facility] [employer] MARK  [address] [facility]  [phone] fax    Physical Therapy Progress Note  Patient name: Sarah Xiao Start of Care: 2022   Referral source: Bianca Sewell MD : 1975                Medical Diagnosis: Muscle weakness [M62.81]  Payor: [address] [place] [address] Road / Plan: [address] / Product Type: Managed Care Medicaid /  Onset Date: 2022                Treatment Diagnosis: Impaired gait/balance; Muscle weakness    Prior Hospitalization: see medical history Provider#: [id number]   Medications: Verified on Patient summary List    Comorbidities:Neurological disease, Myotonic Dystrophy Type 1 Back pain, GI disease, Hearing impairment, other disorders, Visual impairment-2019- cataracts surgery   Prior Level of Function: Patient lives with brother and has a home attendant with him to assist as needed. Visits from Start of Care: 13    Missed Visits: 2    Established Goals:         Excellent           Good         Limited           None  [] Increased ROM   []  []  [x]  []  [] Increased Strength  []  []  [x]  []  [] Increased Mobility  []  []  [x]  []   [] Decreased Pain   []  [x]  []  [x]  [] Decreased Swelling  []  []  []  [x]    Key Functional Changes: curb negotiation has improved. Updated Goals: to be achieved in 4 weeks:   Goal: Patient will perform supine to sit transfers from 30 degree inclined table without assistance in order to increase ease of transfer at home. Status at evaluation/last progress note: patient completed supine to sit transfers with [facility] at 45 degrees angle (I) but exhibited decreased cervical control and required assistance for head support. 2.   Goal:  Patient will perform Romberg balance on foam with eyes closed without LOB for 30 seconds in order to demonstrate improve safety at home.    Status at evaluation/last progress note: patient experienced 2 episodes of LOB during 30 seconds     3. Goal:  Patient will be able to ambulate 100 feet with LRAD without instances of instability to increase safety with home navigation. Status at evaluation/last progress note: patient ambulated using  feet on level surface with CG/SBA        4. Goal: Patient will ambulate backwards for 30 feet without LOB or cueing in order to demonstrate improving balance for increased ease of opening doors at home. Status at evaluation/last progress note: patient exhibited decrease control with retroactive ambulation with FWW, including 1 LOB. ASSESSMENT/RECOMMENDATIONS:Mr. Jessica Renee presents for a progress note today, after a month-long gap in PT treatment, due to personal scheduling conflicts. His brother and he report that he has shown limited progress since beginning treatment, primarily with regard to improved curb negotiation. This is probably due to his large gap in treatment. They both spoke of wishing to continue treatment now that his schedule has opened up more. Mr. Jessica Renee has improved with regard to his bed mobility but continues to exhibit decreased cervical control, continues to require assistance with ambulation, decreased stride length and width, and fatigues easily. He continues to experience multiple losses of balance, particularly when on uneven terrain. He would continue to benefit from skilled PT to address remaining deficits for safer ambulation within his household and community.     [x]Continue therapy per initial plan/protocol at a frequency of  2 x per week for 4 weeks  []Continue therapy with the following recommended changes:_____________________      _____________________________________________________________________  []Discontinue therapy progressing towards or have reached established goals  []Discontinue therapy due to lack of appreciable progress towards goals  []Discontinue therapy due to lack of attendance or compliance  []Await Physician's recommendations/decisions regarding therapy  []Other:________________________________________________________________    Thank you for this referral.   Flo Subramanian, PTA 9/16/2022 4:23 PM    NOTE TO PHYSICIAN:  PLEASE COMPLETE THE ORDERS BELOW AND   FAX TO [facility]: [phone]  If you are unable to process this request in 24 hours please contact our office: [phone]    I have read the above report and request that my patient continue as recommended. I have read the above report and request that my patient continue therapy with the following changes/special instructions:____________________________________  I have read the above report and request that my patient be discharged from therapy.     Physicians signature: ______________________________Date: ______Time:______     Maddie Cook MD

## 2022-09-16 NOTE — PROGRESS NOTES
PT DAILY TREATMENT NOTE     Patient Name: Lyle Ramsey  Date:2022  : 1975  [x]  Patient  Verified  Payor: [address] [place] [address] Road / Plan: [address] / Product Type: Managed Care Medicaid /    In time:128  Out time:213  Total Treatment Time (min): 45  Visit #: 8 of 8      Treatment Area: Generalized weakness [R53.1]    SUBJECTIVE  Pain Level (0-10 scale): 0  Any medication changes, allergies to medications, adverse drug reactions, diagnosis change, or new procedure performed?: [x] No    [] Yes (see summary sheet for update)  Subjective functional status/changes:   [] No changes reported  Patient's brother states that patient awoke and reported that his back hurt. He is recovering from a left calf strain and presents to clinic wearing an ACE bandage. Patient pointed to his mid-lumbar spine to indicate back pain. Patient's brother states that he has not been attending appointments in clinic due to having \"other appointments\". Brother states that he would like to continue with PT.     OBJECTIVE    20 min Therapeutic Activity:  [x]  See flow sheet :   Rationale: increase ROM, increase strength, improve coordination, and increase proprioception  to improve the patients ability to complete functional activities with ease. 8 min Neuromuscular Re-education:  [x]  See flow sheet : functional and static balancing   Rationale: increase ROM, increase strength, improve coordination, improve balance, and increase proprioception  to improve the patients ability to improve biomechanics and increase kinesthetic awareness for ease of ADL completion. 17 min Self Care/Home Management: patient and brother heavily educated on increased attendance for improved therapeutic outcomes, as well as use of secure shower handles. FWW ambulation trialed in clinic to potentially add to updated POC for safer ambulation with LRAD.  Patient educated on need to slow down while transferring and ambulating. Patient and brother expressed positive attitude toward potential use of FWW when deemed appropriate for independent use by clinician. Rationale:  to educate patient   to improve the patients ability to improve therapeutic outcomes and increase safety during transfers and household/community negotiaton. With   [x] TE   [] TA   [] neuro   [] other: Patient Education: [x] Review HEP    [] Progressed/Changed HEP based on:   [] positioning   [] body mechanics   [] transfers   [] heat/ice application    [] other:      Other Objective/Functional Measures:   -patient completed 3 supine to sit transfers with [facility] at 45 degrees angle (I) but exhibited decreased cervical control. He completed 2 supine to sit transfers with [facility] at 30 degrees with decreased cervical control.     -patient experienced 2 episodes of LOB during 30 seconds of Rhomberg balance on foam    -patient ambulated using  feet on level surface with CG/SBA    -patient exhibited decrease control with retroactive ambulation with FWW, including 1 LOB. Pain Level (0-10 scale) post treatment: 0    ASSESSMENT/Changes in Function: Mr. Karey Luke presents for a progress note today, after a month-long gap in PT treatment, due to personal scheduling conflicts. His brother and he report that he has shown limited progress since beginning treatment, primarily with regard to improved curb negotiation. This is probably due to his large gap in treatment. They both spoke of wishing to continue treatment now that his schedule has opened up more. Mr. Karey Luke has improved with regard to his bed mobility but continues to exhibit decreased cervical control, continues to require assistance with ambulation, decreased stride length and width, and fatigues easily. He continues to experience multiple losses of balance, particularly when on uneven terrain.  He would continue to benefit from skilled PT to address remaining deficits for safer ambulation within his household and community. Patient will continue to benefit from skilled PT services to modify and progress therapeutic interventions, address functional mobility deficits, address ROM deficits, address strength deficits, analyze and address soft tissue restrictions, analyze and cue movement patterns, analyze and modify body mechanics/ergonomics, assess and modify postural abnormalities, address imbalance/dizziness, and instruct in home and community integration to attain remaining goals. []  See Plan of Care  [x]  See progress note/recertification  []  See Discharge Summary         Progress towards goals / Updated goals:  Goal: Patient will perform supine to sit transfers from 45 degree inclined table without assistance in order to increase ease of transfer at home. Status at evaluation/last progress note: assistance for head support  Current: met, patient completed supine to sit transfers with [facility] at 45 degrees angle (I) but exhibited decreased cervical control. 2.   Goal:  Patient will perform Romberg balance on foam with eyes closed without LOB for 30 seconds in order to demonstrate improve safety at home. Status at evaluation/last progress note: 2 episodes of LOB during 30 seconds   Current: unmet, patient experienced 2 episodes of LOB during 30 seconds    3. Goal:  Patient will be able to ambulate 100 feet holding 8# with SBA without instances of instability to increase safety with home navigation. Status at evaluation/last progress note: continues to ambulate with SBA   Current: Unmet: patient ambulated using  feet on level surface with CG/SBA      4. Goal: Patient will ambulate backwards for 30 feet without LOB or cueing in order to demonstrate improving balance for increased ease of opening doors at home.    Status at evaluation/last progress note: CGA and multiple LOB  Current: unmet: patient exhibited decrease control with retroactive ambulation with FWW, including 1 LOB.     Functional Gains: curb negotiation  Functional Deficits: ambulating on uneven terrain, ambulating safely  Patient Goal: \"to move independently\"      PLAN  [x]  Upgrade activities as tolerated     []  Continue plan of care  []  Update interventions per flow sheet       []  Discharge due to:_  []  Other:_      Roman Kim, VALENTE 9/16/2022  9:21 AM    Future Appointments   Date Time Provider Luke Doe   9/16/2022 12:45 PM Kamran Rather [id number] [facility]   9/16/2022  1:30 PM Marcin Pressley, PTA [id number] [facility]   9/21/2022 12:00 PM Miguel Orlando, PTA MMCPTPB [facility]   9/21/2022  3:00 PM PPA SPIROMETRY BSPSC BS AMB   9/23/2022 12:00 PM Ethelene Ink, PT [id number] [facility]   9/28/2022 12:45 PM Davee Catching, PTA MMCPTPB [facility]   9/30/2022  1:30 PM Ethelene Ink, PT HNABUEL [facility]   10/4/2022  1:30 PM Davee Catching, PTA MMCPTPB [facility]   10/7/2022 12:00 PM Ethelene Ink, PT MMCPTPB [facility]

## 2022-09-19 NOTE — PROGRESS NOTES
[facility] - Christiano Slade  [address] [facility]  [phone] fax    Occupational Therapy Progress Note  Patient name: Valerie Sue Start of Care: 22   Referral source: Chau Flores MD : 1975   Medical/Treatment Diagnosis: Weakness of right upper extremity [R29.898]  Weakness of left upper extremity [R29.898]  Payor: [address] [place] [address] Road / Plan: [address] / Product Type: Managed Care Medicaid /  Onset Date:Birth      Prior Hospitalization: see medical history Provider#: [id number]   Medications: Verified on Patient Summary List     Comorbidities: myotonic muscular dystrophy, hearing impairment  Prior Level of Function:I self care  Visits from Start of Care: 14    Missed Visits: 1  *Limited visits since last Progress note due to conflicting appointments     Established Goals:         Excellent           Good         Limited           None  [] Increased ROM   []  []  []  []  [] Increased Strength  []  []  []  []  [] Increased Mobility  []  []  []  []   [] Decreased Pain   []  []  []  []  [] Decreased Swelling  []  []  []  []  [] Increased Fine Motor Skills []  []  []  []  [x] Increased ADL [place] []  []  [x]  []    Key Functional Changes: starting to incorporate adaptive strategies/equipment into self care and functional tasks, unable to perform formal reassessment due to limited visits , plan to finish out last 5 visits then discharge     Goal; Caregiver to be familiar with adaptive equipment to ease bathing. Status at Last PN: Progressing, discussed bath mitt as addition  Current Status:  Progressing, goal continued for improved carryover from clinic to home    Goal: Patient will be independent in home exercise program for UE strengthening within limits of diagnosis. Status at Last PN: Added general UE s.  trengthening with 1#.  Compliant with putty at home.   Progressing   Current Status: Progressing, intermittent compliance             Goal  Patient will be minimal assist for bathing for safety. Status at Last PN:  Able to bathe with supervision but requires assist for transfer to shower chair due to balance   Current Status: Improving per patient and caregiver report      Goal: Patient will don t shirt without assist.  Status at Last PN:  Able to don/doff on this date with updated technique, check for carryover at next appointment    Current Status: Progressing, cueing required, goal continued      Goal: Finalize home program to maximize strength within limits of disability  Status at Last PN: new goal    Current Status: Not addressed due to limited visits     Updated Goals: to be achieved in 5 weeks:  Goal; Caregiver to be familiar with adaptive equipment to ease bathing. Status at Last PN:   Progressing, goal continued for improved carryover from clinic to home    Goal: Patient will be independent in home exercise program for UE strengthening within limits of diagnosis. Status at Last PN: : Progressing, intermittent compliance             Goal  Patient will be minimal assist for bathing for safety.   Status at Last PN: Improving per patient and caregiver report      Goal: Patient will don t shirt without assist.  Status at Last PN:   Progressing, cueing required, goal continued      Goal: Finalize home program to maximize strength within limits of disability  Status at Last PN: Not addressed due to limited visits      ASSESSMENT/RECOMMENDATIONS:  [x]Continue therapy per initial plan/protocol at a frequency of  1-2 x per week for 5 visits  []Continue therapy with the following recommended changes:_____________________      _____________________________________________________________________  []Discontinue therapy progressing towards or have reached established goals  []Discontinue therapy due to lack of appreciable progress towards goals  []Discontinue therapy due to lack of attendance or compliance  []Await Physician's recommendations/decisions regarding therapy  []Other:________________________________________________________________    Thank you for this referral.   Matt JANAE Wright/L 9/19/2022 10:40 AM  NOTE TO PHYSICIAN:  PLEASE COMPLETE THE ORDERS BELOW AND   [facility]: [phone]  If you are unable to process this request in 24 hours please contact our office: [phone]    I have read the above report and request that my patient continue as recommended. I have read the above report and request that my patient continue therapy with the following changes/special instructions:________________________________________________________  I have read the above report and request that my patient be discharged from therapy.     [de-identified] Signature:____________Date:_________TIME:________     Esau Juarez MD  ** Signature, Date and Time must be completed for valid certification **

## 2022-09-21 ENCOUNTER — HOSPITAL ENCOUNTER (OUTPATIENT)
Dept: PHYSICAL THERAPY | Age: 47
Discharge: HOME | End: 2022-09-21
Payer: COMMERCIAL

## 2022-09-21 ENCOUNTER — OFFICE VISIT (OUTPATIENT)
Dept: PULMONOLOGY | Age: 47
End: 2022-09-21
Payer: COMMERCIAL

## 2022-09-21 VITALS — HEIGHT: 62 IN | WEIGHT: 107 LBS | BODY MASS INDEX: 19.69 KG/M2

## 2022-09-21 DIAGNOSIS — G71.00 MUSCULAR DYSTROPHY (HCC): Primary | ICD-10-CM

## 2022-09-21 PROCEDURE — 94060 EVALUATION OF WHEEZING: CPT | Performed by: INTERNAL MEDICINE

## 2022-09-21 PROCEDURE — 94729 DIFFUSING CAPACITY: CPT | Performed by: INTERNAL MEDICINE

## 2022-09-21 PROCEDURE — 97112 NEUROMUSCULAR REEDUCATION: CPT

## 2022-09-21 PROCEDURE — 97116 GAIT TRAINING THERAPY: CPT

## 2022-09-21 PROCEDURE — 94727 GAS DIL/WSHOT DETER LNG VOL: CPT | Performed by: INTERNAL MEDICINE

## 2022-09-21 PROCEDURE — 97110 THERAPEUTIC EXERCISES: CPT

## 2022-09-21 PROCEDURE — 97530 THERAPEUTIC ACTIVITIES: CPT

## 2022-09-21 NOTE — PROGRESS NOTES
PT DAILY TREATMENT NOTE     Patient Name: Tripp Bonilla  Date:2022  : 1975  [x]  Patient  Verified  [facility] [address] / Plan: [address] / Product Type: Managed Care Medicaid /    In time:  12:00  Out time: 12:50  Total Treatment Time (min): 50  Visit #: 1 of 8    Treatment Area: Generalized weakness [R53.1]    SUBJECTIVE  Pain Level (0-10 scale): 0/10  Any medication changes, allergies to medications, adverse drug reactions, diagnosis change, or new procedure performed?: [x] No    [] Yes (see summary sheet for update)  Subjective functional status/changes:   [] No changes reported  Pt. Reports he is doing pretty good today. He reports less pain in left calf but has been having some back pain in the mornings. OBJECTIVE    17 min Therapeutic Exercise:  [x] See flow sheet :   Rationale: increase ROM and increase strength to improve the patients ability to perform ADLs    15 min Therapeutic Activity:  []  See flow sheet : step ups, curb negotation, transfer training    Rationale: increase ROM and increase strength  to improve the patients ability to perform ADLs     8 min Neuromuscular Re-education:  []  See flow sheet : standing balance activities   Rationale: increase strength, improve coordination, and improve balance  to improve the patients ability to ambulate     10 min Gait Training:  __100_ feet x2 with _RW__ device on level surfaces with _CGA__ level of assist and around cones   Rationale: to increase ease of ambulation. With   [x] TE   [] TA   [] neuro   [] other: Patient Education: [x] Review HEP    [] Progressed/Changed HEP based on:   [] positioning   [] body mechanics   [] transfers   [] heat/ice application    [] other:      Other Objective/Functional Measures:   Pt.  Was educated on lumbar stretches for HEP  Challenged with turning RW and keeping walker on ground during [employer] with curb negotiation and was limited by visual deficits   Continues to require support for his neck during supine to sit and sit to supine transfers     Pain Level (0-10 scale) post treatment: 0/10    ASSESSMENT/Changes in Function:  pt. Is making slow progress towards goals. He demonstrates some improvement in ambulation tolerance with RW and may benefit from further practice in order to improve independence at home. He also continues to have difficulty with bed mobility secondary to poor neck control. Patient will continue to benefit from skilled PT services to modify and progress therapeutic interventions, address functional mobility deficits, address ROM deficits, address strength deficits, analyze and address soft tissue restrictions, analyze and cue movement patterns, and analyze and modify body mechanics/ergonomics to attain remaining goals. Progress towards goals / Updated goals:              Goal: Patient will perform supine to sit transfers from 30 degree inclined table without assistance in order to increase ease of transfer at home. Status at evaluation/last progress note: patient completed supine to sit transfers with [facility] at 45 degrees angle (I) but exhibited decreased cervical control and required assistance for head support. 2.   Goal:  Patient will perform Romberg balance on foam with eyes closed without LOB for 30 seconds in order to demonstrate improve safety at home. Status at evaluation/last progress note: patient experienced 2 episodes of LOB during 30 seconds     3. Goal:  Patient will be able to ambulate 100 feet with LRAD without instances of instability to increase safety with home navigation. Status at evaluation/last progress note: patient ambulated using  feet on level surface with CG/SBA        4. Goal: Patient will ambulate backwards for 30 feet without LOB or cueing in order to demonstrate improving balance for increased ease of opening doors at home.    Status at evaluation/last progress note: patient exhibited decrease control with retroactive ambulation with FWW, including 1 LOB.      PLAN  []  Upgrade activities as tolerated     [x]  Continue plan of care  []  Update interventions per flow sheet       []  Discharge due to:_  []  Other:_      Jonathan Jacobo, PT 9/21/2022  11:46 AM    Future Appointments   Date Time Provider Luke Doe   9/21/2022 12:00 PM Kennymargareth Acevess, PT MMCPTPB [facility]   9/21/2022  3:00 PM PPA SPIROMETRY BSPSC BS AMB   9/23/2022 12:00 PM [facility], PT MMCPTPB [facility]   9/28/2022 12:45 PM Job cMkeon, PTA MMCPTPB [facility]   9/30/2022  1:30 PM Kenny Chaudhry, PT MMCPTPB [facility]   10/4/2022  1:30 PM Job Mckeon, PTA MMCPTPB [facility]   10/7/2022 12:00 PM Kennymargareth Acevess, PT MMCPTPB [facility]

## 2022-09-23 ENCOUNTER — HOSPITAL ENCOUNTER (OUTPATIENT)
Dept: PHYSICAL THERAPY | Age: 47
Discharge: HOME | End: 2022-09-23
Payer: COMMERCIAL

## 2022-09-23 ENCOUNTER — HOSPITAL ENCOUNTER (OUTPATIENT)
Dept: LAB | Age: 47
Discharge: HOME | End: 2022-09-23
Payer: COMMERCIAL

## 2022-09-23 DIAGNOSIS — E87.5 HYPERKALEMIA: ICD-10-CM

## 2022-09-23 LAB — POTASSIUM SERPL-SCNC: 4.4 MMOL/L (ref 3.5–5.5)

## 2022-09-23 PROCEDURE — 97116 GAIT TRAINING THERAPY: CPT

## 2022-09-23 PROCEDURE — 97112 NEUROMUSCULAR REEDUCATION: CPT

## 2022-09-23 PROCEDURE — 84132 ASSAY OF SERUM POTASSIUM: CPT

## 2022-09-23 PROCEDURE — 97530 THERAPEUTIC ACTIVITIES: CPT

## 2022-09-23 PROCEDURE — 97110 THERAPEUTIC EXERCISES: CPT

## 2022-09-23 PROCEDURE — 36415 COLL VENOUS BLD VENIPUNCTURE: CPT

## 2022-09-23 NOTE — PROGRESS NOTES
PT DAILY TREATMENT NOTE     Patient Name: Janeth Pereira  Date:2022  : 1975  [x]  Patient  Verified  Payor: [address] / Plan: [address] / Product Type: Managed Care Medicaid /    In time: 12:00  Out time: 12:40  Total Treatment Time (min): 40  Visit #: 2 of 8        Treatment Area: Generalized weakness [R53.1]    SUBJECTIVE  Pain Level (0-10 scale): 0/10  Any medication changes, allergies to medications, adverse drug reactions, diagnosis change, or new procedure performed?: [x] No    [] Yes (see summary sheet for update)  Subjective functional status/changes:   [] No changes reported  Pt. Reports he is feeling pretty good today today. Pt. Brother reports he has been doing better with log rolling to get out of bed.      OBJECTIVE    8 min Therapeutic Exercise:  [x] See flow sheet :   Rationale: increase strength to improve the patients ability to perform transfers    10 min Therapeutic Activity:  []  See flow sheet : sit to stand transfers, supine to sit and sit to supine transfers   Rationale: increase strength and improve coordination  to improve the patients ability to perform transfers     12 min Neuromuscular Re-education:  []  See flow sheet : standing balance activiites, cone weaving   Rationale: increase strength, improve coordination, and improve balance  to improve the patients ability to ambulate     10 min Gait Training:  _50__ feet x 3 with __RW_ device on level surfaces with CGA___ level of assist with focus on staying in walker during turns   Rationale: to increase ease of ambulation           With   [x] TE   [] TA   [] neuro   [] other: Patient Education: [x] Review HEP    [] Progressed/Changed HEP based on:   [] positioning   [] body mechanics   [] transfers   [] heat/ice application    [] other:      Other Objective/Functional Measures:   Continues to require support on his head during bed mobility but was was to perform with less assistance today  Improved turning with RW today with more difficulty performing on pavement  Challenged with cone weaving with RW  He continues to have difficulty and LOB with curb negotiations with RW but did improve some with repetitions. Pain Level (0-10 scale) post treatment: 0/10    ASSESSMENT/Changes in Function:  pt. Is progressing slowly towards goals. He is having less pain today and demonstrates improving balance with ambulation with RW. He also demonstrated increased ease of log roll transfers today. He did have more difficulty and frequent LOB during standing balance exercises today. Patient will continue to benefit from skilled PT services to modify and progress therapeutic interventions, address functional mobility deficits, address ROM deficits, address strength deficits, analyze and address soft tissue restrictions, analyze and cue movement patterns, analyze and modify body mechanics/ergonomics, and address imbalance/dizziness to attain remaining goals. Progress towards goals / Updated goals:              Goal: Patient will perform supine to sit transfers from 30 degree inclined table without assistance in order to increase ease of transfer at home. Status at evaluation/last progress note: patient completed supine to sit transfers with [facility] at 45 degrees angle (I) but exhibited decreased cervical control and required assistance for head support. 2.   Goal:  Patient will perform Romberg balance on foam with eyes closed without LOB for 30 seconds in order to demonstrate improve safety at home. Status at evaluation/last progress note: patient experienced 2 episodes of LOB during 30 seconds     3. Goal:  Patient will be able to ambulate 100 feet with LRAD without instances of instability to increase safety with home navigation.   Status at evaluation/last progress note: patient ambulated using  feet on level surface with CG/SBA  Progressing: improving stability with ambulation with RW (9/23/22)        4. Goal: Patient will ambulate backwards for 30 feet without LOB or cueing in order to demonstrate improving balance for increased ease of opening doors at home. Status at evaluation/last progress note: patient exhibited decrease control with retroactive ambulation with FWW, including 1 LOB.      PLAN  []  Upgrade activities as tolerated     [x]  Continue plan of care  []  Update interventions per flow sheet       []  Discharge due to:_  []  Other:_      Myrtle Snider, PT 9/23/2022  7:50 AM    Future Appointments   Date Time Provider Luke Doe   9/23/2022 12:00 PM Maddie Delay, PT [facility]   9/28/2022 12:45 PM Citlaly Quiet, PTA [facility]   9/30/2022  1:30 PM Maddie Delay, PT [facility]   10/4/2022  1:30 PM Citlaly Quiet, PTA [facility]   10/7/2022 12:00 PM Maddie Delay, PT [facility]

## 2022-09-28 ENCOUNTER — HOSPITAL ENCOUNTER (OUTPATIENT)
Dept: PHYSICAL THERAPY | Age: 47
Discharge: HOME | End: 2022-09-28
Payer: COMMERCIAL

## 2022-09-28 PROCEDURE — 97530 THERAPEUTIC ACTIVITIES: CPT

## 2022-09-28 PROCEDURE — 97112 NEUROMUSCULAR REEDUCATION: CPT

## 2022-09-28 PROCEDURE — 97116 GAIT TRAINING THERAPY: CPT

## 2022-09-28 NOTE — PROGRESS NOTES
PT DAILY TREATMENT NOTE     Patient Name: Toni Henson  Date:2022  : 1975  [x]  Patient  Verified  Payor: [address] / Plan: [address] / Product Type: Managed Care Medicaid /    In time: 12:45   Out time: 1:30  Total Treatment Time (min): 45  Visit #: 3 of 8    Treatment Area: Generalized weakness [R53.1]    SUBJECTIVE  Pain Level (0-10 scale): 0/10  Any medication changes, allergies to medications, adverse drug reactions, diagnosis change, or new procedure performed?: [x] No    [] Yes (see summary sheet for update)  Subjective functional status/changes:   [] No changes reported  Pt reports no current pain but does end up with sharp pains in the left>right thigh with muscle activation. The dystrophy neurologist appt is on Thursday and they will be asking about pain and questions regarding the diagnosis. The brother hasn't pursued a walker yet because he wanted to see how he would do with it. They  notice most buckling of the legs after standing still to talk and then start up walking. They would need assistance with maneuvering the walker in the bathroom and with car transfers to be able to open the door.       OBJECTIVE    15 min Therapeutic Activity:  [x]  See flow sheet : sit to stand transfers, supine to sit and sit to supine transfers   Rationale: increase strength and improve coordination  to improve the patients ability to perform transfers     15 min Neuromuscular Re-education:  [x]  See flow sheet : standing balance activiites, cone weaving   Rationale: increase strength, improve coordination, and improve balance  to improve the patients ability to ambulate     15 min Gait Training:  _50__ feet x 3 with __RW_ device on level surfaces with CGA___ level of assist with focus on staying in walker during turns   Rationale: to increase ease of ambulation           With   [x] TE   [] TA   [] neuro   [] other: Patient Education: [x] Review HEP    [] Progressed/Changed HEP based on:   [] positioning   [] body mechanics   [] transfers   [] heat/ice application    [] other:      Other Objective/Functional Measures:   Educated about gentle rolling or gentle stretching of the quad without increased pain  Pt tends to hyperextend the left>right in standing  TTP rectus femoris and lateralis closer towards origin  Trial of SAQ for TKE stability but had pain on the left attempting to perform  Educated on watching in standing to reduce genu recurvatum in order to already have quads engaged for decreased buckling upon start up walking  Good form with log rolling and cueing for chin tuck  Improved sequencing with RW on curbs but verbal cues to stay within walker versus forward reaching to reduce chance of falls for ascent and descent    Pain Level (0-10 scale) post treatment: 0/10    ASSESSMENT/Changes in Function:  Pt making slow, steady progress working on [employer] and use for curbs and household ambulation. He continues to experience buckling with start up walking after prolonged standing due to use of genu recurvatum for stability. He has sharp pain in the left quad with muscle activation but has noticeable atrophy and tends to lock left knee > right knee. Will continue to progress functional mobility using RW such as with bathroom navigation and car transfers to decrease fall risk and decrease caregiver burden. Patient will continue to benefit from skilled PT services to modify and progress therapeutic interventions, address functional mobility deficits, address ROM deficits, address strength deficits, analyze and address soft tissue restrictions, analyze and cue movement patterns, analyze and modify body mechanics/ergonomics, and address imbalance/dizziness to attain remaining goals.      Progress towards goals / Updated goals:              Goal: Patient will perform supine to sit transfers from 30 degree inclined table without assistance in order to increase ease of transfer at home. Status at evaluation/last progress note: patient completed supine to sit transfers with [facility] at 45 degrees angle (I) but exhibited decreased cervical control and required assistance for head support. 2.   Goal:  Patient will perform Romberg balance on foam with eyes closed without LOB for 30 seconds in order to demonstrate improve safety at home. Status at evaluation/last progress note: patient experienced 2 episodes of LOB during 30 seconds     3. Goal:  Patient will be able to ambulate 100 feet with LRAD without instances of instability to increase safety with home navigation. Status at evaluation/last progress note: patient ambulated using  feet on level surface with CG/SBA  Progressing: improving stability with ambulation with RW (9/23/22)        4. Goal: Patient will ambulate backwards for 30 feet without LOB or cueing in order to demonstrate improving balance for increased ease of opening doors at home. Status at evaluation/last progress note: patient exhibited decrease control with retroactive ambulation with FWW, including 1 LOB.      PLAN  [x]  Upgrade activities as tolerated     [x]  Continue plan of care  []  Update interventions per flow sheet       []  Discharge due to:_  [x]  Other: work with RW management in small spaces such as bathroom and with car transfers      Kacey Vasquez PTA 9/28/2022  7:50 AM    Future Appointments   Date Time Provider Luke Doe   9/28/2022 12:45 PM Sohail Frye [facility]   9/30/2022 12:45 PM Kirsten Rick OT [facility]   9/30/2022  1:30 PM Curtis Marshall, AGNES [facility]   10/4/2022  1:30 PM Marcos Bryan PTA [facility]   10/7/2022 12:00 PM Curtis Marshall PT [facility]   10/7/2022 12:45 PM Ashanti Treadwell [facility]

## 2022-09-30 ENCOUNTER — HOSPITAL ENCOUNTER (OUTPATIENT)
Dept: PHYSICAL THERAPY | Age: 47
End: 2022-09-30
Payer: COMMERCIAL

## 2022-10-04 ENCOUNTER — HOSPITAL ENCOUNTER (OUTPATIENT)
Dept: PHYSICAL THERAPY | Age: 47
Discharge: HOME | End: 2022-10-04
Payer: COMMERCIAL

## 2022-10-04 PROCEDURE — 97530 THERAPEUTIC ACTIVITIES: CPT

## 2022-10-04 PROCEDURE — 97112 NEUROMUSCULAR REEDUCATION: CPT

## 2022-10-04 PROCEDURE — 97116 GAIT TRAINING THERAPY: CPT

## 2022-10-04 NOTE — PROGRESS NOTES
PT DAILY TREATMENT NOTE     Patient Name: Alanis Bello  Date:10/4/2022  : 1975  [x]  Patient  Verified  Payor: [address] / Plan: [address] / Product Type: Managed Care Medicaid /    In time: 1:30    Out time: 2:15  Total Treatment Time (min): 45  Visit #: 4 of 8    Treatment Area: Generalized weakness [R53.1]    SUBJECTIVE  Pain Level (0-10 scale): 0/10  Any medication changes, allergies to medications, adverse drug reactions, diagnosis change, or new procedure performed?: [x] No    [] Yes (see summary sheet for update)  Subjective functional status/changes:   [] No changes reported  Pt reports neurologist appt is this Thursday to discuss prognosis and plan to see if any additional thought of strength training or supplement use to help. Brother reports pt tends to lose balance backwards when using the restroom standing or trying to open the car door. They haven't pursued the walker further because they feel he is okay walking around the house but occasionally has the knees buckling.        OBJECTIVE    15 min Therapeutic Activity:  [x]  See flow sheet : car transfer; sit to stands    Rationale: increase strength and improve coordination  to improve the patients ability to perform transfers     15 min Neuromuscular Re-education:  [x]  See flow sheet :    Rationale: increase strength, improve coordination, and improve balance  to improve the patients ability to ambulate     15 min Gait Training:  bathroom simulation turning with RW   Rationale: to increase ease of ambulation           With   [x] TE   [] TA   [] neuro   [] other: Patient Education: [x] Review HEP    [] Progressed/Changed HEP based on:   [] positioning   [] body mechanics   [] transfers   [] heat/ice application    [] other:      Other Objective/Functional Measures:   Bathroom deemed after brother assisted set up very small with little room for RW management  Educated brother after MD appt we would need to have a very concrete focus in therapy and to decide if he wants to continue pursuing RW use and management since he seemed hesitant to want to get one yet with brother's condition  Educated we could work on standing balance for restroom use and opening/closing the car door to reduce LOB backwards  Educated we could continue with RW use but it would be most beneficial if pt got one in order to have carryover of training at home instead of just in therapy to limit confusion  Provided email and fax number if the neurologist wanted to reach out to therapy for any assistance or questions    Pain Level (0-10 scale) post treatment: 0/10    ASSESSMENT/Changes in Function:  Pt making slow, steady progress in therapy. While he is able to improve RW management with cueing, there is no carryover at home as there is hesitancy of it's use at home with small spaces and due to the pt still walking around the home without an AD. He has most difficulty with maintaining equal weightbearing anteriorly so LOB with standing tends to be backwards and continues to utilize genu recurvatum for support. Will place more focus on standing balance with UE movements such as car transfers to improve independence and safety and await pt and brother decision on further progression of RW training to limit pt confusion of when and where to use AD. Patient will continue to benefit from skilled PT services to modify and progress therapeutic interventions, address functional mobility deficits, address ROM deficits, address strength deficits, analyze and address soft tissue restrictions, analyze and cue movement patterns, analyze and modify body mechanics/ergonomics, and address imbalance/dizziness to attain remaining goals. Progress towards goals / Updated goals:              Goal: Patient will perform supine to sit transfers from 30 degree inclined table without assistance in order to increase ease of transfer at home.    Status at evaluation/last progress note: patient completed supine to sit transfers with [facility] at 45 degrees angle (I) but exhibited decreased cervical control and required assistance for head support. 2.   Goal:  Patient will perform Romberg balance on foam with eyes closed without LOB for 30 seconds in order to demonstrate improve safety at home. Status at evaluation/last progress note: patient experienced 2 episodes of LOB during 30 seconds     3. Goal:  Patient will be able to ambulate 100 feet with LRAD without instances of instability to increase safety with home navigation. Status at evaluation/last progress note: patient ambulated using  feet on level surface with CG/SBA  Progressing: improving stability with ambulation with RW (9/23/22)        4. Goal: Patient will ambulate backwards for 30 feet without LOB or cueing in order to demonstrate improving balance for increased ease of opening doors at home. Status at evaluation/last progress note: patient exhibited decrease control with retroactive ambulation with FWW, including 1 LOB.      PLAN  [x]  Upgrade activities as tolerated     [x]  Continue plan of care  []  Update interventions per flow sheet       []  Discharge due to:_  []  Other:     Keri Michael PTA 10/4/2022  7:50 AM    Future Appointments   Date Time Provider Luke Doe   10/4/2022  1:30 PM Lesly Davis [facility]   10/7/2022 12:00 PM Ministerio Donald PT [facility]   10/7/2022 12:45 PM Justin Lopez OT [facility]

## 2022-10-07 ENCOUNTER — HOSPITAL ENCOUNTER (OUTPATIENT)
Dept: PHYSICAL THERAPY | Age: 47
Discharge: HOME | End: 2022-10-07
Payer: COMMERCIAL

## 2022-10-07 PROCEDURE — 97535 SELF CARE MNGMENT TRAINING: CPT

## 2022-10-07 PROCEDURE — 97110 THERAPEUTIC EXERCISES: CPT

## 2022-10-07 PROCEDURE — 97530 THERAPEUTIC ACTIVITIES: CPT

## 2022-10-07 PROCEDURE — 97112 NEUROMUSCULAR REEDUCATION: CPT

## 2022-10-07 NOTE — PROGRESS NOTES
[facility] [employer]  [address]  [phone] fax    Continued Plan of Care/ Re-certification for Occupational Therapy Services    Patient name: Tom Simpson Start of Care: 22   Referral source: Gina Chun MD : 1975   Medical/Treatment Diagnosis: Weakness of right upper extremity [R29.898]  Weakness of left upper extremity [R29.898]  Payor: [address] [place] [address] Road / Plan: [address] / Product Type: Managed Care Medicaid /  Onset Date: Birth     Prior Hospitalization: see medical history Provider#: [id number]   Medications: Verified on Patient Summary List    Comorbidities: myotonic muscular dystrophy, hearing impairment  Prior Level of Function: I self care  Visits from Start of Care: 15    Missed Visits: 1    The Plan of Care and following information is based on the patient's current status:    Goal: Caregiver to be familiar with adaptive equipment to ease bathing. Status at last note/certification: Progressing, goal continued for improved carryover from clinic to home  Current Status: Pt/caregiver ed on toothpaste tube squeezer to assist in independence in toothbrushing. Goal not met    Goal: Patient will be independent in home exercise program for UE strengthening within limits of diagnosis. Status at last note/certification: Progressing, intermittent compliance  Current Status: Caregiver reports that pt has been working on reaching and strengthening tasks at home. Continues to require cueing. Goal not met. Goal: Patient will be minimal assist for bathing for safety. Status at last note/certification: Improving per patient and caregiver report   Current Status: Caregiver reports continued use of shower chair, use of detachable shower head. Caregiver reports stand by assist during bathing tasks. Caregiver reports Mod A for tub transfers. Goal not met.     Goal: Patient will don t shirt without assist.  Status at last note/certification: Progressing, cueing required, goal continued   Current Status: Caregiver reports providing supervision for donning and Mod A for doffing. Progressing, continue to monitor. Goal not met. Goal: Finalize home program to maximize strength within limits of disability  Status at last note/certification: Not addressed due to limited visits   Current Status: Caregiver reports continued use of theraputty. Caregiver reports use of 1# weights at home for strengthening. Caregiver reports playing catch/throwing ball with patient. Continue to address. Goal not met. Key functional changes:  Patient is continuing to make progress towards independence with ADL/IADL tasks. Upon discussion with caregiver, pt is continuing to have deficits in doffing sweatshirts/hoodies, squeezing toothpaste, turning/pulling door knobs, and transferring in and out of the tub during bathing tasks. Per caregiver/patient report and observation throughout therapy sessions, patient has made progress with independence in ADL/IADL tasks with assistance from adaptive methods/equipment. Will continue to address strength HEP, adaptive equipment, and participation in ADL/IADL tasks to promote patient independence in daily tasks. Treatment Plan: Therapeutic exercise, Therapeutic activities, Patient education, ADLs/IADLs, and Other      Goals for this certification period to be accomplished in 4 treatments:    Goal: Caregiver to be familiar with adaptive equipment to ease bathing. Status at last note/certification: Progressing, goal continued for improved carryover from clinic to home  Current Status: Pt/caregiver ed on toothpaste tube squeezer to assist in independence in toothbrushing. Goal not met    Goal: Patient will be independent in home exercise program for UE strengthening within limits of diagnosis.   Status at last note/certification: Progressing, intermittent compliance  Current Status: Caregiver reports that pt has been working on reaching and strengthening tasks at home. Continues to require cueing  Goal not met. Goal: Patient will be minimal assist for bathing for safety. Status at last note/certification: Improving per patient and caregiver report   Current Status: Caregiver reports continued use of shower chair, use of detachable shower head. Caregiver reports stand by assist during bathing tasks. Caregiver reports Mod A for tub transfers  Goal not met. Goal: Patient will don t shirt without assist.  Status at last note/certification: Progressing, cueing required, goal continued   Current Status: Caregiver reports providing supervision for donning and Mod A for doffing. Progressing, continue to monitor. Goal not met. Goal: Finalize home program to maximize strength within limits of disability  Status at last note/certification: Not addressed due to limited visits   Current Status: Caregiver reports continued use of theraputty. Caregiver reports use of 1# weights at home for strengthening. Caregiver reports playing catch/throwing ball with patient. Continue to address. Goal not met. *New goal:    Goal: Patient will doff a sweatshirt/hoodie with supervision to complete the task. Frequency / Duration: Patient to be seen 2 times per week for 4 treatments:    Assessment / Recommendations: Pt would continue to benefit from skilled occupational therapy services to increase independence in ADL/IALD tasks. Certification Period: N/A    Ry Chang OTR/L 10/7/2022 11:23 AM    ________________________________________________________________________  I certify that the above Therapy Services are being furnished while the patient is under my care. I agree with the treatment plan and certify that this therapy is necessary.       [de-identified] Signature:____________Date:_________TIME:________     Michelle Ricci MD  ** Signature, Date and Time must be completed for valid certification **      Please sign and return to [facility] [employer]  [address] [place] [address]            [phone] fax

## 2022-10-07 NOTE — PROGRESS NOTES
OCCUPATIONAL THERAPY - DAILY TREATMENT NOTE    Patient Name: Linda Leung        Date: 10/7/2022  : 1975   YES Patient  Verified  Visit #:   4   of   8  Ins[facility]: [facility] [address] / Plan: [address] / Product Type: Managed Care Medicaid /      In time: 12:50 Out time: 1:38   Total Treatment Time: 38       TREATMENT AREA =  Bilateral UE    SUBJECTIVE    Pain Level (on 0 to 10 scale):  0  / 10   Medication Changes/New allergies or changes in medical history, any new surgeries or procedures? NO    If yes, update Summary List   Subjective Functional Status/Changes:  []  No changes reported     \"I feel like I can do it myself. \"       OBJECTIVE    13 min Therapeutic Exercise:  [x]  See flow sheet   Rationale:      increase ROM and increase strength to improve the patients ability to reach, , and pinch for ADL participation. -recheck   -see flow sheet   -\" pegs in medium soft theraputty   -4# clips with sponges bilateral        10 min Therapeutic Activity:    Rationale:    increase ROM and increase strength to improve the patients ability to reach, , and pinch for ADL participation.      -x30 1\" pegs sets of three placement bilateral     15 min Self Care/Home Management:    Rationale:    improve independence in ADLs/IADLs.     -Caregiver/pt ed on toothpaste tube squeezer to increase independence in task   -Caregiver/pt discussion on improvements/current status with ADL/IADLs       min Patient Education:  YES  Reviewed HEP   []  Progressed/Changed HEP based on:   Pt/caregiver education on continuing with current HEP     Other Objective/Functional Measures:    -10/10 1/4\" pegs in medium soft theraputty   -Tolerated 1\" pegs sets of three placement and removal bilateral with no rest breaks  -Mod cueing for 1.5# digiflex bilateral   -Maintained pinch with all digits on 4# clip to retrieve sponges from table top      Post Treatment Pain Level (on 0 to 10) scale: 0  / 10     ASSESSMENT  Assessment/Changes in Function:     Patient is continuing to make progress towards independence with ADL/IADL tasks. Upon discussion with caregiver, pt is continuing to have deficits in doffing sweatshirts/hoodies, squeezing toothpaste, turning/pulling door knobs, and transferring in and out of the tub during bathing tasks. Per caregiver/patient report and observation throughout therapy sessions, patient has made progress with independence in ADL/IADL tasks with assistance from adaptive methods/equipment. Will continue to address strength HEP, adaptive equipment, and participation in ADL/IADL tasks to promote patient independence in daily tasks. []  See Progress Note/Recertification   Patient will continue to benefit from skilled OT services to modify and progress therapeutic interventions, address strength deficits, and instruct in home and community integration to attain remaining goals. Progress toward goals / Updated goals:    Goal; Caregiver to be familiar with adaptive equipment to ease bathing. Status at Last PN:   Progressing, goal continued for improved carryover from clinic to home  Status at PN (10/7/22): Pt/caregiver ed on toothpaste tube squeezer     Goal: Patient will be independent in home exercise program for UE strengthening within limits of diagnosis. Status at Last PN: : Progressing, intermittent compliance  Status at PN (10/7/22): Continuing with HEP. Caregiver reports that pt has been working on reaching and strengthening tasks at home. Continues to require cueing. Goal not met. Goal  Patient will be minimal assist for bathing for safety. Status at Last PN: Improving per patient and caregiver report   Status at PN (10/7/22): Caregiver reports continued use of shower chair, use of detachable shower head. Caregiver reports stand by assist during bathing tasks. Caregiver reports Mod A for tub transfers. Goal not met.       Goal: Patient will don t shirt without assist.  Status at Last PN:   Progressing, cueing required, goal continued   Status at PN (10/7/22): Caregiver reports providing supervision for donning and Mod A for doffing. Progressing, continue to monitor. Goal not met. Goal: Finalize home program to maximize strength within limits of disability  Status at Last PN: Not addressed due to limited visits   Status at PN (10/7/22): Caregiver reports continued use of theraputty. Caregiver reports use of 1# weights at home for strengthening. Caregiver reports playing catch/throwing ball with patient. Continue to address.           PLAN  []  Upgrade activities as tolerated YES Continue plan of care   []  Discharge due to :    []  Other:      Therapist: AKANKSHA Ahn/GREGG    Date: 10/7/2022 Time: 11:20 AM     Future Appointments   Date Time Provider Luke Doe   10/7/2022 12:00 PM Nancy Donohue PT [id number]   10/7/2022 12:45 PM Luh Brambila, [address] [facility]

## 2022-10-07 NOTE — PROGRESS NOTES
PT DAILY TREATMENT NOTE     Patient Name: Socrates Newton  Date:10/7/2022  : 1975  [x]  Patient  Verified  Payor: [address] / Plan: [address] / Product Type: Managed Care Medicaid /    In time:12:00  Out time: 12:40  Total Treatment Time (min): 40  Visit #: 5 of 8    Medicare/BCBS Only   Total Timed Codes (min):  40 1:1 Treatment Time:  40       Treatment Area: Generalized weakness [R53.1]    SUBJECTIVE  Pain Level (0-10 scale): 0/10  Any medication changes, allergies to medications, adverse drug reactions, diagnosis change, or new procedure performed?: [x] No    [] Yes (see summary sheet for update)  Subjective functional status/changes:   [] No changes reported  Pt. Reports he is feeling pretty good today. 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 He also recommended not using a walker at this time. OBJECTIVE    8 min Therapeutic Exercise:  [x] See flow sheet :   Rationale: increase ROM and increase strength to improve the patients ability to ambulate     8 min Therapeutic Activity:  [x]  See flow sheet : stairs, curb negotiation   Rationale: increase strength  to improve the patients ability to perform transfers     24 min Neuromuscular Re-education:  []  See flow sheet : standing balance activities, side stepping, backward ambulation, ambulation with quick stops, ambulation with changing speed. Obstacle course with small hurdles and foam   Rationale: increase strength, improve coordination, and improve balance  to improve the patients ability to ambulate           With   [x] TE   [] TA   [] neuro   [] other: Patient Education: [x] Review HEP    [] Progressed/Changed HEP based on:   [] positioning   [] body mechanics   [] transfers   [] heat/ice application    [] other:      Other Objective/Functional Measures:   Romberg foam eyes closed: pt.  Has frequent LOB  Poor ankle strategy during balance activities  He was educated on trying to recognize when his legs are fatigued and to take frequent rest breaks but pt. Continues to require cues for this  He was challenged with stepping over small hurdles but performed without LOB  He continues to have decreased balance and LE control as he fatigues    Pain Level (0-10 scale) post treatment: 0/10    ASSESSMENT/Changes in Function: pt. Is making limited progress towards goals. He continues to have decreased balance on compliant surfaces and with eyes closed. He also continues to demonstrate decreased ankle strategy in standing. He continues to be limited by fatigue and requires CGA during ambulation for when his legs give out in order to prevent falls. Patient will continue to benefit from skilled PT services to modify and progress therapeutic interventions, address functional mobility deficits, address ROM deficits, address strength deficits, analyze and address soft tissue restrictions, analyze and cue movement patterns, analyze and modify body mechanics/ergonomics, assess and modify postural abnormalities, and address imbalance/dizziness to attain remaining goals. Progress towards goals / Updated goals:              Goal: Patient will perform supine to sit transfers from 30 degree inclined table without assistance in order to increase ease of transfer at home. Status at evaluation/last progress note: patient completed supine to sit transfers with [facility] at 45 degrees angle (I) but exhibited decreased cervical control and required assistance for head support. 2.   Goal:  Patient will perform Romberg balance on foam with eyes closed without LOB for 30 seconds in order to demonstrate improve safety at home. Status at evaluation/last progress note: patient experienced 2 episodes of LOB during 30 seconds  Not met: multiple LOB (10/7/22)     3.    Goal:  Patient will be able to ambulate 100 feet with LRAD without instances of instability to increase safety with home navigation. Status at evaluation/last progress note: patient ambulated using  feet on level surface with CG/SBA  Progressing: improving stability with ambulation with RW (9/23/22)        4. Goal: Patient will ambulate backwards for 30 feet without LOB or cueing in order to demonstrate improving balance for increased ease of opening doors at home. Status at evaluation/last progress note: patient exhibited decrease control with retroactive ambulation with FWW, including 1 LOB.      PLAN  []  Upgrade activities as tolerated     [x]  Continue plan of care  []  Update interventions per flow sheet       []  Discharge due to:_  []  Other:_      Fred Pulido PT 10/7/2022  7:15 AM    Future Appointments   Date Time Provider Luke Doe   10/7/2022 12:00 PM Quentin Yepez, PT [id number]   10/7/2022 12:45 PM Paris Smith, [address] [place] [address] [facility]

## 2022-10-12 ENCOUNTER — HOSPITAL ENCOUNTER (OUTPATIENT)
Dept: PHYSICAL THERAPY | Age: 47
Discharge: HOME | End: 2022-10-12
Payer: COMMERCIAL

## 2022-10-12 PROCEDURE — 97530 THERAPEUTIC ACTIVITIES: CPT

## 2022-10-12 PROCEDURE — 97110 THERAPEUTIC EXERCISES: CPT

## 2022-10-12 PROCEDURE — 97535 SELF CARE MNGMENT TRAINING: CPT

## 2022-10-12 NOTE — PROGRESS NOTES
OT DAILY TREATMENT NOTE 10-18    Patient Name: Tom Simpson  Date:10/12/2022  : 1975  [x]  Patient  Verified  Payor: [address] [place] [address] Road / Plan: [address] / Product Type: Managed Care Medicaid /    In time:130  Out time:226  Total Treatment Time (min): 64  Visit #: 1 of 4      Treatment Area: Weakness of right upper extremity [R29.898]  Weakness of left upper extremity [R29.898]    SUBJECTIVE  Pain Level (0-10 scale): 0/10  Any medication changes, allergies to medications, adverse drug reactions, diagnosis change, or new procedure performed?: [x] No    [] Yes (see summary sheet for update)  Subjective functional status/changes:   [] No changes reported  Patient caregiver reporting buckling in BLE during tub transfers, they are currently using shower chair vs bench. OBJECTIVE    10 min Therapeutic Exercise:  [] See flow sheet :   Rationale: increase ROM and increase strength to improve the patients ability to , lift     Red Therabar: 3 ways: 15x each     20 min Therapeutic Activity:  []  See flow sheet :   Rationale: increase ROM and improve coordination  to improve the patients ability to manipulate small items,     Basketball:  in stand with SBA- using B UE  Basketball Throwing: for balance, overhead tasks, strength building, coordination   Perfection: Pieces placed using B Hands, untimed     26 min Self Care/Home Management: Dressing, Adaptive Equipment   Rationale: increase ROM, increase strength, and improve coordination  to improve the patients ability to perform ADL/IADL    Upper Body Dressing: Patient don/doffed pullover shirt   Tub Transfer Bench: Pt and Pt caregiver education provided, patient able to demonstrate proper transfer techniques in clinic with clinic tub bench.        With   [] TE   [] TA   [] neuro   [] other: Patient Education: [x] Review HEP    [] Progressed/Changed HEP based on:   [] positioning   [] body mechanics   [] transfers   [] heat/ice application   [] Splint wear/care   [] Sensory re-education   [] scar management      [] other:            Other Objective/Functional Measures:     Supervision with min cueing during don/doffing pullover shirt  Able to complete tub transfer bench with supervision for sequencing      Pain Level (0-10 scale) post treatment: 0/10    ASSESSMENT/Changes in Function: Patient might benefit from tub transfer bench  due to patient tendency to have BLE \"give out\" and due to previous falls of both patient and care giver when performing bathtub transfer. Patient will continue to benefit from skilled OT services to modify and progress therapeutic interventions, address ROM deficits, address strength deficits, and instruct in home and community integration to attain remaining goals. []  See Plan of Care  []  See progress note/recertification  []  See Discharge Summary         Progress towards goals / Updated goals:  Goal: Caregiver to be familiar with adaptive equipment to ease bathing. Status at last note/certification: Progressing, goal continued for improved carryover from clinic to home  Current Status: Pt/caregiver ed on toothpaste tube squeezer to assist in independence in toothbrushing. Goal not met     Goal: Patient will be independent in home exercise program for UE strengthening within limits of diagnosis. Status at last note/certification: Progressing, intermittent compliance  Current Status: Caregiver reports that pt has been working on reaching and strengthening tasks at home. Continues to require cueing  Goal not met. Goal: Patient will be minimal assist for bathing for safety. Status at last note/certification: : Caregiver reports continued use of shower chair, use of detachable shower head. Caregiver reports stand by assist during bathing tasks.  Caregiver reports Mod A for tub transfers  Current Status (10/12/2022): Pt/Caregiver education and demonstration of use of tub transfer bench      Goal: Patient will don t shirt without assist.  Status at last note/certification: Progressing, cueing required, goal continued   Current Status: Caregiver reports providing supervision for donning and Mod A for doffing. Progressing, continue to monitor. Goal not met. Goal: Finalize home program to maximize strength within limits of disability  Status at last note/certification: Not addressed due to limited visits   Current Status: Caregiver reports continued use of theraputty. Caregiver reports use of 1# weights at home for strengthening. Caregiver reports playing catch/throwing ball with patient. Continue to address. Goal not met. *New goal:     Goal: Patient will doff a sweatshirt/hoodie with supervision to complete the task. Current Status (10/12/2022):  Met for today, check for consistency     PLAN  []  Upgrade activities as tolerated     [x]  Continue plan of care  []  Update interventions per flow sheet       []  Discharge due to:_  []  Other:_      Dominick Plascencia ,CARDOSO/L  10/12/2022  10:06 AM        Future Appointments   Date Time Provider Luke Doe   10/12/2022  1:30 PM Madeleine Bad [id number] [facility]   10/14/2022  1:30 PM Madeleine Bad [id number] [facility]   10/19/2022  1:30 PM Madeleine Bad [id number] [facility]   10/21/2022  1:30 PM Madeleine Bad [id number] [facility]

## 2022-10-14 ENCOUNTER — HOSPITAL ENCOUNTER (OUTPATIENT)
Dept: PHYSICAL THERAPY | Age: 47
Discharge: HOME | End: 2022-10-14
Payer: COMMERCIAL

## 2022-10-14 PROCEDURE — 97110 THERAPEUTIC EXERCISES: CPT

## 2022-10-14 PROCEDURE — 97535 SELF CARE MNGMENT TRAINING: CPT

## 2022-10-14 PROCEDURE — 97530 THERAPEUTIC ACTIVITIES: CPT

## 2022-10-14 NOTE — PROGRESS NOTES
OT DAILY TREATMENT NOTE 10-18    Patient Name: Alanis Bello  Date:10/14/2022  : 1975  [x]  Patient  Verified  [facility] [address] / Plan: [address] / Product Type: Managed Care Medicaid /    In time:130  Out time:210  Total Treatment Time (min): 40  Visit #: 2 of 4    Treatment Area: Weakness of right upper extremity [R29.898]  Weakness of left upper extremity [R29.898]    SUBJECTIVE  Pain Level (0-10 scale): 0/10  Any medication changes, allergies to medications, adverse drug reactions, diagnosis change, or new procedure performed?: [x] No    [] Yes (see summary sheet for update)  Subjective functional status/changes:   [] No changes reported  Patient reporting side pain this morning when he woke up     OBJECTIVE    15 min Therapeutic Exercise:  [] See flow sheet :   Rationale: increase ROM and increase strength to improve the patients ability to lift,     Red Therabar: 3 ways: 15x  Med Soft Theraputty: Manipulated with B Hands: 10/10     10 min Therapeutic Activity:  []  See flow sheet :   Rationale: increase ROM and improve coordination  to improve the patients ability to manipulate small items     Perfection: untimed, placed using B Hands     15 min Self Care/Home Management: lacing   Rationale: increase ROM, increase strength, and improve coordination  to improve the patients ability to perform functional tasked with increased safety and independence     Review of tub transfer bench   Lacing   Pullover top- don/doff     With   [] TE   [] TA   [] neuro   [] other: Patient Education: [x] Review HEP    [] Progressed/Changed HEP based on:   [] positioning   [] body mechanics   [] transfers   [] heat/ice application   [] Splint wear/care   [] Sensory re-education   [] scar management      [] other:            Other Objective/Functional Measures:     Initial difficulty with lacing that improved as task progressed     Pain Level (0-10 scale) post treatment: 0/10    ASSESSMENT/Changes in Function: plan for DC in 2 more visits     Patient will continue to benefit from skilled OT services to modify and progress therapeutic interventions, address ROM deficits, address strength deficits, and instruct in home and community integration to attain remaining goals. []  See Plan of Care  []  See progress note/recertification  []  See Discharge Summary         Progress towards goals / Updated goals:  Goal: Caregiver to be familiar with adaptive equipment to ease bathing. Status at last note/certification: Progressing, goal continued for improved carryover from clinic to home  Current Status: Pt/caregiver ed on toothpaste tube squeezer to assist in independence in toothbrushing. Goal not met     Goal: Patient will be independent in home exercise program for UE strengthening within limits of diagnosis. Status at last note/certification: Progressing, intermittent compliance  Current Status: Caregiver reports that pt has been working on reaching and strengthening tasks at home. Continues to require cueing  Goal not met. Goal: Patient will be minimal assist for bathing for safety. Status at last note/certification: : Caregiver reports continued use of shower chair, use of detachable shower head. Caregiver reports stand by assist during bathing tasks. Caregiver reports Mod A for tub transfers  Current Status (10/12/2022): Pt/Caregiver education and demonstration of use of tub transfer bench      Goal: Patient will don t shirt without assist.  Status at last note/certification: Progressing, cueing required, goal continued   Current Status: Caregiver reports providing supervision for donning and Mod A for doffing. Progressing, continue to monitor. Goal not met. Goal: Finalize home program to maximize strength within limits of disability  Status at last note/certification: Not addressed due to limited visits   Current Status: Caregiver reports continued use of theraputty. Caregiver reports use of 1# weights at home for strengthening. Caregiver reports playing catch/throwing ball with patient. Continue to address. Goal not met. Goal: Patient will doff a sweatshirt/hoodie with supervision to complete the task. Current Status (10/12/2022):  Met for today, check for consistency     PLAN  []  Upgrade activities as tolerated     [x]  Continue plan of care  []  Update interventions per flow sheet       []  Discharge due to:_  []  Other:_      REMA Rangel  10/14/2022  11:37 AM        Future Appointments   Date Time Provider Luke Doe   10/14/2022  1:30 PM Maximiliano Dao [id number] [facility]   10/19/2022  1:30 PM Maximiliano Dao VINEGVL [facility]   10/21/2022  1:30 PM Maximiliano Dao [id number] [facility]

## 2022-10-19 ENCOUNTER — HOSPITAL ENCOUNTER (OUTPATIENT)
Dept: PHYSICAL THERAPY | Age: 47
Discharge: HOME | End: 2022-10-19
Payer: COMMERCIAL

## 2022-10-19 PROCEDURE — 97535 SELF CARE MNGMENT TRAINING: CPT

## 2022-10-19 PROCEDURE — 97530 THERAPEUTIC ACTIVITIES: CPT

## 2022-10-19 PROCEDURE — 97110 THERAPEUTIC EXERCISES: CPT

## 2022-10-19 NOTE — PROGRESS NOTES
OT DAILY TREATMENT NOTE 10-18    Patient Name: Lucho Mercado  Date:10/19/2022  : 1975  [x]  Patient  Verified  Payor: [address] [place] [address] Road / Plan: [address] / Product Type: Managed Care Medicaid /    In time:130  Out time:215  Total Treatment Time (min): 45  Visit #: 3 of 4      Treatment Area: Weakness of right upper extremity [R29.898]  Weakness of left upper extremity [R29.898]    SUBJECTIVE  Pain Level (0-10 scale): 0/10  Any medication changes, allergies to medications, adverse drug reactions, diagnosis change, or new procedure performed?: [x] No    [] Yes (see summary sheet for update)  Subjective functional status/changes:   [] No changes reported  Brother reports he will be speaking with insurance/MD tomorrow re: transfer tub bench     OBJECTIVE    20 min Therapeutic Exercise:  [] See flow sheet :   Rationale: increase ROM and increase strength to improve the patients ability to     Red Therabar: 3 ways: 15x   Med Soft Theraputty: Manipulated with B Hands to reveal/remove 1/4 in pegs     15 min Therapeutic Activity:  []  See flow sheet :   Rationale: increase ROM and improve coordination  to improve the patients ability to manipulate     Perfection: placed with B Hands   Basketball: in stand- dribble, overhand pass, underhand pass, bounce pass- SBA    10 min Self Care/Home Management:    Rationale: increase ROM, increase strength, and improve coordination  to improve the patients ability to perform self care with increased safety and independence     Review of self care goals   Review of Upper body dressing, toilet transfers     With   [] TE   [] TA   [] neuro   [] other: Patient Education: [x] Review HEP    [] Progressed/Changed HEP based on:   [] positioning   [] body mechanics   [] transfers   [] heat/ice application   [] Splint wear/care   [] Sensory re-education   [] scar management      [] other:            Other Objective/Functional Measures:     LOB at end of basketball task- able to catch self (chair located behind patient in stand)- education on importance of rest breaks when needed      Pain Level (0-10 scale) post treatment: 0/10    ASSESSMENT/Changes in Function: plan for Discharge next visit     Patient will continue to benefit from skilled OT services to modify and progress therapeutic interventions, address ROM deficits, address strength deficits, and instruct in home and community integration to attain remaining goals. []  See Plan of Care  []  See progress note/recertification  []  See Discharge Summary         Progress towards goals / Updated goals:  Goal: Caregiver to be familiar with adaptive equipment to ease bathing. Status at last note/certification: Progressing, goal continued for improved carryover from clinic to home  Current Status: Pt/caregiver ed on toothpaste tube squeezer to assist in independence in toothbrushing. Goal not met     Goal: Patient will be independent in home exercise program for UE strengthening within limits of diagnosis. Status at last note/certification: Progressing, intermittent compliance  Current Status: Caregiver reports that pt has been working on reaching and strengthening tasks at home. Continues to require cueing  Goal not met. Goal: Patient will be minimal assist for bathing for safety. Status at last note/certification: : Caregiver reports continued use of shower chair, use of detachable shower head. Caregiver reports stand by assist during bathing tasks. Caregiver reports Mod A for tub transfers  Current Status (10/12/2022): Pt/Caregiver education and demonstration of use of tub transfer bench      Goal: Patient will don t shirt without assist.  Status at last note/certification: Progressing, cueing required, goal continued   Current Status: Caregiver reports providing supervision for donning and Mod A for doffing. Progressing, continue to monitor. Goal not met.      Goal: Finalize home program to maximize strength within limits of disability  Status at last note/certification: Not addressed due to limited visits   Current Status: Caregiver reports continued use of theraputty. Caregiver reports use of 1# weights at home for strengthening. Caregiver reports playing catch/throwing ball with patient. Continue to address. Goal not met. Goal: Patient will doff a sweatshirt/hoodie with supervision to complete the task. Current Status (10/12/2022):  Met for today, check for consistency        PLAN  []  Upgrade activities as tolerated     [x]  Continue plan of care  []  Update interventions per flow sheet       []  Discharge due to:_  []  Other:_      REMA Langford  10/19/2022  10:04 AM        Future Appointments   Date Time Provider Luke Doe   10/19/2022  1:30 PM Keith Balderas [id number] [facility]   10/21/2022  1:30 PM Keith Balderas [id number] [facility]

## 2022-10-21 ENCOUNTER — HOSPITAL ENCOUNTER (OUTPATIENT)
Dept: PHYSICAL THERAPY | Age: 47
Discharge: HOME | End: 2022-10-21
Payer: COMMERCIAL

## 2022-10-21 PROCEDURE — 97110 THERAPEUTIC EXERCISES: CPT

## 2022-10-21 PROCEDURE — 97530 THERAPEUTIC ACTIVITIES: CPT

## 2022-10-21 PROCEDURE — 97535 SELF CARE MNGMENT TRAINING: CPT

## 2022-10-21 NOTE — PROGRESS NOTES
OT DAILY TREATMENT NOTE 10-18    Patient Name: Kristopher Swan  Date:10/21/2022  : 1975  [x]  Patient  Verified  [facility] [address] [address] / Plan: Soheila Saurav / Product Type: Managed Care Medicaid /    In time:130  Out time:215  Total Treatment Time (min): 45  Visit #: 4 of 4      Treatment Area: Weakness of right upper extremity [R29.898]  Weakness of left upper extremity [R29.898]    SUBJECTIVE  Pain Level (0-10 scale): 010  Any medication changes, allergies to medications, adverse drug reactions, diagnosis change, or new procedure performed?: [x] No    [] Yes (see summary sheet for update)  Subjective functional status/changes:   [] No changes reported  PT brother reporting they are interested in speech therapy     OBJECTIVE    15 min Therapeutic Exercise:  [x] See flow sheet :   Rationale: increase ROM and increase strength to improve the patients ability to     15 min Therapeutic Activity:  [x]  See flow sheet :   Rationale: increase ROM and improve coordination  to improve the patients ability to manipulate small items     15 min Self Care/Home Management: ADaptive   Rationale: increase ROM, increase strength, and improve coordination  to improve the patients ability to perform functional tasks with increased independence       With   [] TE   [] TA   [] neuro   [] other: Patient Education: [x] Review HEP    [] Progressed/Changed HEP based on:   [] positioning   [] body mechanics   [] transfers   [] heat/ice application   [] Splint wear/care   [] Sensory re-education   [] scar management      [] other:            Other Objective/Functional Measures:     Able to tolerate upgrade to medium putty     Pain Level (0-10 scale) post treatment: 0/10    ASSESSMENT/Changes in Function: improved independence with self re tasks     Patient will continue to benefit from skilled OT services to modify and progress therapeutic interventions, address ROM deficits, address strength deficits, and instruct in home and community integration to attain remaining goals. []  See Plan of Care  []  See progress note/recertification  []  See Discharge Summary         Progress towards goals / Updated goals:  Goal: Caregiver to be familiar with adaptive equipment to ease bathing. Status at last note/certification: Progressing, goal continued for improved carryover from clinic to home  Current Status: Pt/caregiver ed on toothpaste tube squeezer to assist in independence in toothbrushing. Status at Discharge (10/21/2022): Goal Met     Goal: Patient will be independent in home exercise program for UE strengthening within limits of diagnosis. Status at last note/certification: Progressing, intermittent compliance  Current Status: Caregiver reports that pt has been working on reaching and strengthening tasks at home. Continues to require cueing  Status at Discharge (10/21/2022):  goal met     Goal: Patient will be minimal assist for bathing for safety. Status at last note/certification: : Caregiver reports continued use of shower chair, use of detachable shower head. Caregiver reports stand by assist during bathing tasks. Caregiver reports Mod A for tub transfers  Status at Discharge (10/21/2022): Goal met for bathing aspect, not met for transfers      Goal: Patient will don t shirt without assist.  Status at last note/certification: Progressing, cueing required, goal continued   Current Status: Caregiver reports providing supervision for donning and Mod A for doffing. Progressing, continue to monitor. Status at Discharge (10/21/2022): not met, some assist still required      Goal: Finalize home program to maximize strength within limits of disability  Status at last note/certification: Not addressed due to limited visits   Current Status: Caregiver reports continued use of theraputty. Caregiver reports use of 1# weights at home for strengthening.  Caregiver reports playing catch/throwing ball with patient. Continue to address. Goal not met. Goal: Patient will doff a sweatshirt/hoodie with supervision to complete the task.    Status at Discharge (10/21/2022):  met    PLAN  []  Upgrade activities as tolerated     [x]  Continue plan of care  []  Update interventions per flow sheet       []  Discharge due to:_  []  Other:_      Clide Flinton ,CARDOSO/L  10/21/2022  11:52 AM        Future Appointments   Date Time Provider Luke Doe   10/21/2022  1:30 PM Efra Moraes QZTRQDH [facility]   11/3/2022  1:30 PM Manuela Sanon MD FP [facility]

## 2022-10-21 NOTE — PROGRESS NOTES
[facility] - [place] [facility] [facility]. [facility]  [phone] fax      Patient Name: Cyril Riedel  : 1975      Occupational Therapy Discharge Instructions        Continue with home exercise program for 2-3 times a day for 4 weeks then decrease to 1-2 times a day as needed/patient discretion. Continue with    [] Ice  as needed  times per day     [x] Heat           Follow up with MD:     [] Upon completion of therapy     [x] As needed      Recommendations:    []   Return to activity with home program                    []  Return to activity with the following modifications :                              []  Practice Hand coordination activities                                      []  Wear Splint as prescribed:                    [] Return to/Join local gym        Additional comments:    - Transfer Tub Bench      Please call with any questions or concerns. Thank you for your participation.          REMA Dixon  10/21/2022  1:46 PM

## 2022-10-26 NOTE — PROGRESS NOTES
[facility] - [facility]  [address] [facility]  [phone] fax    Occupational Therapy Discharge Summary       Patient name: Kam Addison Start of Care: 22   Referral source: Bjorn Ray MD : 1975   Medical/Treatment Diagnosis: Weakness of right upper extremity [R29.898]  Weakness of left upper extremity [R29.898]  Payor: [address] [place] [address] Road / Plan: [address] / Product Type: Managed Care Medicaid /  Onset Date: Birth      Prior Hospitalization: see medical history Provider#: [id number]   Medications: Verified on Patient Summary List     Comorbidities: myotonic muscular dystrophy, hearing impairment  Prior Level of Function: I self care  Visits from Start of Care: 19    Missed Visits: 1  Reporting Period : 10/7/22 to 10/21/22    Summary of Care:Patient was seen for ADL's, therapeutic exercises and activities to improve hand function and improve safety and independence with ADL/IADL's. Patient has HEP. Goal: Caregiver to be familiar with adaptive equipment to ease bathing. Status at last note/certification: Progressing, goal continued for improved carryover from clinic to home  Current Status: Pt/caregiver ed on toothpaste tube squeezer to assist in independence in toothbrushing. Status at Discharge (10/21/2022): Goal Met     Goal: Patient will be independent in home exercise program for UE strengthening within limits of diagnosis. Status at last note/certification: Progressing, intermittent compliance  Current Status: Caregiver reports that pt has been working on reaching and strengthening tasks at home. Continues to require cueing  Status at Discharge (10/21/2022):  goal met     Goal: Patient will be minimal assist for bathing for safety. Status at last note/certification: : Caregiver reports continued use of shower chair, use of detachable shower head. Caregiver reports stand by assist during bathing tasks. Caregiver reports Mod A for tub transfers  Status at Discharge (10/21/2022): Goal met for bathing aspect, not met for transfers      Goal: Patient will don t shirt without assist.  Status at last note/certification: Progressing, cueing required, goal continued   Current Status: Caregiver reports providing supervision for donning and Mod A for doffing. Progressing, continue to monitor. Status at Discharge (10/21/2022): not met, some assist still required      Goal: Finalize home program to maximize strength within limits of disability  Status at last note/certification: Not addressed due to limited visits   Current Status: Caregiver reports continued use of theraputty. Caregiver reports use of 1# weights at home for strengthening. Caregiver reports playing catch/throwing ball with patient. Continue to address. Goal not met. Goal: Patient will doff a sweatshirt/hoodie with supervision to complete the task. Status at Discharge (10/21/2022):  met    ASSESSMENT/Changes in Function:  Patient has made good gains with functional independence during his time in skilled outpatient occupational therapy. Extensive patient and caregiver education provided on adaptive strategies to increase safety with transfers during self care tasks to decrease potential injury to patient and caregiver. Patient would benefit from future maintenance program with skilled OT to keep up current strength and function levels. Patient would also benefit from skilled outpatient Speech therapy to address concerns with stuttering with speech and some cognitive functions. ASSESSMENT/RECOMMENDATIONS:  [x]Discontinue therapy: [x]Patient has reached or is progressing toward set goals      []Patient is non-compliant or has abdicated      []Due to lack of appreciable progress towards set goals    REMA Sandoval  10/26/2022 1:55 PM    NOTE TO PHYSICIAN:  Please complete the following and fax to:    [facility] at [facility] at [phone]  . Retain this original for your records. If you are unable to process this request in   24 hours, please contact our office.      [] I have read the above report and request that my patient continue therapy with the following changes/special instructions:  [] I have read the above report and request that my patient be discharged from therapy    Physician's Signature:____________Date:_________TIME:________     Quinn Pedroza MD  ** Signature, Date and Time must be completed for valid certification **

## 2022-11-02 ENCOUNTER — HOSPITAL ENCOUNTER (OUTPATIENT)
Dept: PHYSICAL THERAPY | Age: 47
Discharge: HOME | End: 2022-11-02
Payer: COMMERCIAL

## 2022-11-02 PROCEDURE — 97112 NEUROMUSCULAR REEDUCATION: CPT

## 2022-11-02 PROCEDURE — 97530 THERAPEUTIC ACTIVITIES: CPT

## 2022-11-02 PROCEDURE — 97110 THERAPEUTIC EXERCISES: CPT

## 2022-11-02 NOTE — PROGRESS NOTES
PT DAILY TREATMENT NOTE     Patient Name: Verena Wang  Date:2022  : 1975  [x]  Patient  Verified  Payor: [address] / Plan: [address] / Product Type: Managed Care Medicaid /    In time: 1:36  Out time: 2:25  Total Treatment Time (min): 49  Visit #: 6 of 8    Medicare/BCBS Only   Total Timed Codes (min):  49 1:1 Treatment Time:  49       Treatment Area: Generalized weakness [R53.1]    SUBJECTIVE  Pain Level (0-10 scale): 0/10  Any medication changes, allergies to medications, adverse drug reactions, diagnosis change, or new procedure performed?: [x] No    [] Yes (see summary sheet for update)  Subjective functional status/changes:   [] No changes reported  Pt reports no current pain but brother states there have been two falls recently which he did not witness. The patient reports bending to pick something up and losing his balance.       OBJECTIVE    10 min Therapeutic Exercise:  [x] See flow sheet :   Rationale: increase ROM and increase strength to improve the patients ability to ambulate     25 min Therapeutic Activity:  [x]  See flow sheet :    Rationale: increase strength  to improve the patients ability to perform transfers     10 min Neuromuscular Re-education:  [x]  See flow sheet :    Rationale: increase strength, improve coordination, and improve balance  to improve the patients ability to ambulate           With   [x] TE   [] TA   [] neuro   [] other: Patient Education: [x] Review HEP    [] Progressed/Changed HEP based on:   [] positioning   [] body mechanics   [] transfers   [] heat/ice application    [] other:      Other Objective/Functional Measures:   Supine<>sit from 30 degree inclined table: met  Romberg foam EC: unable  Ambulation 100 feet: 2 episodes of buckling no LOB with CGA  Ambulation backwards 30 feet: 3 episodes of LOB with CGA   CGA with cone pickups requiring Min A to steady pt upon standing to reduce LOB    Pain Level (0-10 scale) post treatment: 0/10    ASSESSMENT/Changes in Function: See Progress Note. Patient will continue to benefit from skilled PT services to modify and progress therapeutic interventions, address functional mobility deficits, address ROM deficits, address strength deficits, analyze and address soft tissue restrictions, analyze and cue movement patterns, analyze and modify body mechanics/ergonomics, assess and modify postural abnormalities, and address imbalance/dizziness to attain remaining goals. Progress towards goals / Updated goals:              Goal: Patient will perform supine to sit transfers from 30 degree inclined table without assistance in order to increase ease of transfer at home. Status at evaluation/last progress note: patient completed supine to sit transfers with [facility] at 45 degrees angle (I) but exhibited decreased cervical control and required assistance for head support. met     2. Goal:  Patient will perform Romberg balance on foam with eyes closed without LOB for 30 seconds in order to demonstrate improve safety at home. Status at evaluation/last progress note: patient experienced 2 episodes of LOB during 30 seconds  Not met: multiple LOB (10/7/22)  Not met; multiple LOB       3. Goal:  Patient will be able to ambulate 100 feet with LRAD without instances of instability to increase safety with home navigation. Status at evaluation/last progress note: patient ambulated using  feet on level surface with CG/SBA  Progressing: improving stability with ambulation with RW (9/23/22)  Without AD 2 episodes of knee buckling but no LOB ; CGA for safety        4. Goal: Patient will ambulate backwards for 30 feet without LOB or cueing in order to demonstrate improving balance for increased ease of opening doors at home. Status at evaluation/last progress note: patient exhibited decrease control with retroactive ambulation with FWW, including 1 LOB.    3 episodes of LOB with backwards ambulation CGA and Min A to reduce backwards fall (11/2/22)    PLAN  [x]  Upgrade activities as tolerated     [x]  Continue plan of care  []  Update interventions per flow sheet       []  Discharge due to:_  []  Other:_      Isabella Velazquez PTA 11/2/2022  7:15 AM    Future Appointments   Date Time Provider Luke Doe   11/2/2022  1:30 PM Maggi Grigsby, PTA [facility] [address]   11/3/2022  1:30 PM Sabi Dillon [facility]   11/4/2022  1:30 PM Maggi Grigsby, PTA [facility] [address]   11/7/2022 12:45 PM Cuco Hernandez, PT [facility] [address]   11/9/2022  1:30 PM Donsavanna Grigsby, PTA [facility] [address]   11/15/2022  1:30 PM Donsavanna Calista, PTA [facility] [address]

## 2022-11-02 NOTE — PROGRESS NOTES
1. Have you been to the ER, urgent care clinic since your last visit? Hospitalized since your last visit? No    2. Have you seen or consulted any other health care providers outside of the [address] [place] [address] since your last visit? Include any pap smears or colon screening. [facility] Neurology Dr. Marissa Britt: 10/06/22. [facility] Gastroenterology Dr. Echo Hi: 9/07/22. Eye exam LOV: 10/03/22. Chief Complaint   Patient presents with    Dry Eye     Dry eyes    Muscular Dystrophy    Leg Swelling     Left leg swelling      Other     Pain in LLE    LOW BACK PAIN     Low back pain off/on    Medication Evaluation     Requests order for shower bench and wants to discuss if possible to get bed that allows patient to sleep at 30 degree angle     Patient declined flu vaccine.

## 2022-11-03 ENCOUNTER — OFFICE VISIT (OUTPATIENT)
Dept: FAMILY MEDICINE CLINIC | Age: 47
End: 2022-11-03
Payer: COMMERCIAL

## 2022-11-03 VITALS
WEIGHT: 109 LBS | HEART RATE: 63 BPM | RESPIRATION RATE: 16 BRPM | DIASTOLIC BLOOD PRESSURE: 80 MMHG | SYSTOLIC BLOOD PRESSURE: 100 MMHG | TEMPERATURE: 97.2 F | HEIGHT: 62 IN | OXYGEN SATURATION: 98 % | BODY MASS INDEX: 20.06 KG/M2

## 2022-11-03 DIAGNOSIS — H04.123 DRY EYES, BILATERAL: ICD-10-CM

## 2022-11-03 DIAGNOSIS — K59.00 CONSTIPATION, UNSPECIFIED CONSTIPATION TYPE: Primary | ICD-10-CM

## 2022-11-03 DIAGNOSIS — G71.11 MYOTONIC DYSTROPHY, TYPE 1 (HCC): ICD-10-CM

## 2022-11-03 DIAGNOSIS — G71.00 MUSCULAR DYSTROPHY (HCC): ICD-10-CM

## 2022-11-03 PROCEDURE — 99213 OFFICE O/P EST LOW 20 MIN: CPT | Performed by: FAMILY MEDICINE

## 2022-11-03 RX ORDER — MOXIFLOXACIN 5 MG/ML
SOLUTION/ DROPS OPHTHALMIC
COMMUNITY
Start: 2022-09-14 | End: 2022-11-03 | Stop reason: ALTCHOICE

## 2022-11-03 RX ORDER — ERYTHROMYCIN 5 MG/G
OINTMENT OPHTHALMIC
COMMUNITY
Start: 2022-09-14

## 2022-11-03 RX ORDER — WHEAT DEXTRIN 3 G/4 G
POWDER IN PACKET (EA) ORAL
COMMUNITY
Start: 2022-10-03

## 2022-11-03 RX ORDER — POLYETHYLENE GLYCOL 400 AND PROPYLENE GLYCOL 4; 3 MG/ML; MG/ML
2 SOLUTION/ DROPS OPHTHALMIC 4 TIMES DAILY
Qty: 30 ML | Refills: 1 | Status: CANCELLED | OUTPATIENT
Start: 2022-11-03

## 2022-11-03 NOTE — THERAPY RECERTIFICATION
[facility] [employer] [facility] MARK  [address] [facility]  [phone] fax    Physical Therapy Progress Note  Patient name: Verena Wang Start of Care:  2022   Referral source: Sudhir Marcelo MD : 1975   Medical/Treatment Diagnosis: Generalized weakness [R53.1]  Payor: [address] [place] [address] Road / Plan: [address] / Product Type: Managed Care Medicaid /  Onset Date:2022          Prior Hospitalization: see medical history Provider#: [id number]   Medications: Verified on Patient Summary List    Comorbidities:Neurological disease, Myotonic Dystrophy Type 1 Back pain, GI disease, Hearing impairment, other disorders, Visual impairment-2019- cataracts surgery  Prior Level of Function: Patient lives with brother and has a home attendant with him to assist as needed. Visits from Start of Care: 19    Missed Visits: 0    Established Goals:         Excellent           Good         Limited           None  [] Increased ROM   []  []  []  []  [x] Increased Strength  []  []  [x]  []  [x] Increased Mobility  []  []  [x]  []   [x] Decreased Pain   []  [x]  []  []  [] Decreased Swelling  []  []  []  []    Key Functional Changes: improved independence with supine<>sit transfers with head control performing log roll; improved balance on foam eyes open    Updated Goals: to be achieved in 4 weeks:  1. Patient will be able to ambulate 100 feet without instances of instability to increase safety with home navigation. 2. Patient will ambulate backwards for 30 feet without LOB or cueing in order to demonstrate improving balance for increased ease of opening doors at home. 3. Patient will perform Romberg balance on foam with eyes closed without LOB for 30 seconds in order to demonstrate improve safety at home.    4. Patient will be able to retrieve a cone from the floor without LOB with SBA in order to be more independent at home with household chores. ASSESSMENT/RECOMMENDATIONS: Mr. Jose Roberto Pearce is making slow, steady progress towards updated goals in therapy with gap in treatments while awaiting insurance authorization. During his time absent from therapy, he did sustain two falls that were unwitnessed by his brother resulting in hitting his head. The patient noted it was when he was trying to bend down to  an item from the floor and lost his balance. He continues to struggle with EC romberg on compliant surfaces due to decreased ankle strength and proprioception. He continues to have instances of B knee buckling during gait due to continued use of genu recurvatum for stability and lack of TKE strength. He struggles with backwards ambulation due to decrease in anterior LE strength requiring assistance for opening doors and taking steps to sit into chairs. Skilled PT remains medically necessary to progress functional strength and balance for ease of ambulation with decreased fall risk and to improve ease of transfers and assisting with household chores.      [x]Continue therapy per initial plan/protocol at a frequency of  2 x per week for 4 weeks  []Continue therapy with the following recommended changes:_____________________      _____________________________________________________________________  []Discontinue therapy progressing towards or have reached established goals  []Discontinue therapy due to lack of appreciable progress towards goals  []Discontinue therapy due to lack of attendance or compliance  []Await Physician's recommendations/decisions regarding therapy  []Other:________________________________________________________________    Thank you for this referral.   Adelina Thomason, PTA 11/3/2022 11:51 AM    NOTE TO PHYSICIAN:  Erendira Ravi 172   [facility] [phone]  If you are unable to process this request in 24 hours please contact our office: [phone]    I have read the above report and request that my patient continue as recommended. I have read the above report and request that my patient continue therapy with the following changes/special instructions:____________________________________  I have read the above report and request that my patient be discharged from therapy.     Physicians signature: ______________________________Date: ______Time:______     Candy Whitney MD

## 2022-11-03 NOTE — PATIENT INSTRUCTIONS
Please ask neurology office regarding physical therapy and speech therapy. Ask your eye drops refill to ophthalmology.

## 2022-11-03 NOTE — PROGRESS NOTES
HISTORY OF PRESENT ILLNESS  Valentin Carrera is a 42 y.o. male. HPI: Here for routine follow-up. 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 Seen neurology. Brother has a submitted custody paperwork and it is in process. Asking for eyedrop refill which I have advised him to ask from the ophthalmologist that he has already established care. Currently using erythromycin ointment as noted recent diagnosis of keratoconjunctivitis. Advised to have a follow-up with ophthalmologist as recommended. Constipation has been improved on a daily MiraLAX. Abdominal discomfort has improved. He is disabled and will need on and off help with the ADL. Currently sitting comfortable without any acute distress. I have discussed with him to ask with the  from the insurance that his current neurologist is not under covered provider but being referred to them from neurologist/ ce hardy. He will find out how we can get the coverage for current neurologist as they are specialist for muscular dystrophy. Able to ambulate but with support from his brother. No cough cold wheezing. No trouble breathing. Has seen pulmonary in the past.  Had done PFT. No follow-up since then. I have advised to arrange for follow-up to discuss it further but currently has no respiratory discomfort. No appetite or weight changes per brother. Denies any complaints of headache or dizziness. No chest pain or shortness of breath. Visit Vitals  /80 (BP 1 Location: Left upper arm, BP Patient Position: Sitting, BP Cuff Size: Adult)   Pulse 63   Temp 97.2 °F (36.2 °C) (Temporal)   Resp 16   Ht 5' 2\" (1.575 m)   Wt 109 lb (49.4 kg)   SpO2 98%   BMI 19.94 kg/m²     Review medication list, vitals, problem list,allergies. ROS: See HPI    Physical Exam  Pulmonary:      Effort: No respiratory distress. Neurological:      General: No focal deficit present. Mental Status: He is alert.    Psychiatric:      Comments: No behavioral concerns       ASSESSMENT and PLAN    ICD-10-CM ICD-9-CM    1. Constipation, unspecified constipation type: On daily regimen of MiraLAX. Managed by GI at [address] K59.00 564.00     [facility] gastroenterology. had done detox . now daily miralax is helping. abdominal discomfort has improved       2. Dry eyes, bilateral: Advised to follow-up with pulmonologist and ask for refill from the specialist H04. [phone]       3. Muscular dystrophy ([address]  G71.00 359.1       4. Myotonic dystrophy, type 1 ([address]): Established with new neurologist.  See HPI. Currently advised to have a follow-up with the neurologist recommendation regarding speech therapy and physical therapy G71.11 359.21     following neurology. following physical therapy       Brother understood and agreed with the plan  Advised him to complete the thyroid function which was ordered by pulmonologist.  Zita Kulkarni to make a follow-up appointment with pulmonologist to discuss further SBFT was done. Follow-up and Dispositions    Return in about 6 months (around 5/3/2023). Please note that this dictation was completed with [employer], the [employer] voice recognition software. Quite often unanticipated grammatical, syntax, homophones, and other interpretive errors are inadvertently transcribed by the computer software. Please disregard these errors. Please excuse any errors that have escaped final proofreading.

## 2022-11-04 ENCOUNTER — HOSPITAL ENCOUNTER (OUTPATIENT)
Dept: PHYSICAL THERAPY | Age: 47
Discharge: HOME | End: 2022-11-04
Payer: COMMERCIAL

## 2022-11-04 PROCEDURE — 97112 NEUROMUSCULAR REEDUCATION: CPT

## 2022-11-04 PROCEDURE — 97530 THERAPEUTIC ACTIVITIES: CPT

## 2022-11-04 PROCEDURE — 97110 THERAPEUTIC EXERCISES: CPT

## 2022-11-04 NOTE — PROGRESS NOTES
PT DAILY TREATMENT NOTE     Patient Name: Tom Simpson  Date:2022  : 1975  [x]  Patient  Verified  [facility] [address] / Plan: [address] / Product Type: Managed Care Medicaid /    In time: 1:30 Out time: 2:20  Total Treatment Time (min): 50  Visit #: 1 of 8    Medicare/BCBS Only   Total Timed Codes (min): 50 1:1 Treatment Time: 50       Treatment Area: Generalized weakness [R53.1]    SUBJECTIVE  Pain Level (0-10 scale): 0/10  Any medication changes, allergies to medications, adverse drug reactions, diagnosis change, or new procedure performed?: [x] No    [] Yes (see summary sheet for update)  Subjective functional status/changes:   [] No changes reported  Pt reports he wants to work harder to get stronger so he can help his brother and himself. They states most LOB with trying to  bags and the book of DVDs at home and try to carry it. Pt's brother reports a huge decline once leaving [place] because they used to have to walk everywhere and go up/down 4 flights of stairs to their apartment daily.      OBJECTIVE    20 min Therapeutic Exercise:  [x] See flow sheet :   Rationale: increase ROM and increase strength to improve the patients ability to ambulate     15 min Therapeutic Activity:  [x]  See flow sheet : step ups; education on walking program   Rationale: increase strength  to improve the patients ability to perform transfers     15 min Neuromuscular Re-education:  [x]  See flow sheet : foam balance; standing balance; anterior weight shifts; core re-ed   Rationale: increase strength, improve coordination, and improve balance  to improve the patients ability to ambulate           With   [x] TE   [] TA   [] neuro   [] other: Patient Education: [x] Review HEP    [] Progressed/Changed HEP based on:   [] positioning   [] body mechanics   [] transfers   [] heat/ice application    [] other:      Other Objective/Functional Measures:   Continued to have multiple instances of knees buckling with standing balance in //  Most posterior LOB when returning upright from cone pick ups of various heights  Lacks calf strength especially with posterior LOB to regain balance versus stepping backwards  Educated on seated core strengthening to help aide in back pain reduction  Overcompensation of l/s with lifting items from lower surfaces due to decreased trust in B quads    Pain Level (0-10 scale) post treatment: 0/10    ASSESSMENT/Changes in Function: Pt continues to demonstrate lack of TKE with increased knee buckling with ambulation and static balance. He has decreased anterior LE strength to reduce posterior LOB. He has overcompensation of l/s with lifting due to lack of trust in LEs. Will continue to progress strength and balance within functional limits to improve safety at home for decreased fall risk and improved independence with ADLs. Patient will continue to benefit from skilled PT services to modify and progress therapeutic interventions, address functional mobility deficits, address ROM deficits, address strength deficits, analyze and address soft tissue restrictions, analyze and cue movement patterns, analyze and modify body mechanics/ergonomics, assess and modify postural abnormalities, and address imbalance/dizziness to attain remaining goals. Updated Goals: to be achieved in 4 weeks:  1. Patient will be able to ambulate 100 feet without instances of instability to increase safety with home navigation. 2. Patient will ambulate backwards for 30 feet without LOB or cueing in order to demonstrate improving balance for increased ease of opening doors at home. 3. Patient will perform Romberg balance on foam with eyes closed without LOB for 30 seconds in order to demonstrate improve safety at home. 4. Patient will be able to retrieve a cone from the floor without LOB with SBA in order to be more independent at home with household chores.      PLAN  [x] Upgrade activities as tolerated     [x]  Continue plan of care  []  Update interventions per flow sheet       []  Discharge due to:_  []  Other:_      Mikayla Monahan, VALENTE 11/4/2022  7:15 AM    Future Appointments   Date Time Provider Luke Doe   11/4/2022  1:30 PM Shelagh Number, PTA [facility]   11/7/2022 12:45 PM Dawson Calix, PT [facility]   11/9/2022  1:30 PM Shelagh Number, PTA [facility]   11/15/2022  1:30 PM Shelagh Number, PTA [facility]   5/3/2023  1:30 PM Raul Fajardo MD [facility] AM[facility]

## 2022-11-07 ENCOUNTER — APPOINTMENT (OUTPATIENT)
Dept: PHYSICAL THERAPY | Age: 47
End: 2022-11-07
Payer: COMMERCIAL

## 2022-11-09 ENCOUNTER — TELEPHONE (OUTPATIENT)
Dept: NEUROLOGY | Age: 47
End: 2022-11-09

## 2022-11-09 ENCOUNTER — APPOINTMENT (OUTPATIENT)
Dept: PHYSICAL THERAPY | Age: 47
End: 2022-11-09
Payer: COMMERCIAL

## 2022-11-15 ENCOUNTER — HOSPITAL ENCOUNTER (OUTPATIENT)
Dept: PHYSICAL THERAPY | Age: 47
End: 2022-11-15
Payer: COMMERCIAL

## 2022-11-15 ENCOUNTER — PATIENT OUTREACH (OUTPATIENT)
Dept: CASE MANAGEMENT | Age: 47
End: 2022-11-15

## 2022-11-15 DIAGNOSIS — G71.00 MUSCULAR DYSTROPHY (HCC): Primary | ICD-10-CM

## 2022-11-15 NOTE — PROGRESS NOTES
Complex Case Management      Date/Time:  11/15/2022 11:07 AM    Method of communication with patient:phone    [facility] ([facility]) contacted the family by telephone to perform Ambulatory Care Coordination. Verified name and  (PHI) with family as identifiers. Provided introduction to self, and explanation of the Ambulatory Care Manager's role. Reviewed most recent clinic visit w/ family who verbalized understanding. Family given an opportunity to ask questions. Top Challenges reviewed with the Provider   N/a     Hx of Muscular dystrophy, (myotonic type 1)  Pt brother, need form signed [employer] accomidations for their housing authority. PCP can sign. Notified PCP that form will be incoming via fax. Brother states they no longer wish to see their current neurologist as they can't afford it as it is not covered by their insurance. They wish to restart w/ a BS neurologist.  Notified PCP of need for new referral to Dr. Tanna Dhillon. States doing ok otherwise, no other issues/questions    The family agrees to contact the PCP office or the [facility] for questions related to their healthcare. Provided contact information for future reference. Disease Specific:   N/A    Home Health Active: No    DME Active: No    Barriers to care? financial, utilization of services    Advance Care Planning:   Does patient have an Advance Directive:  not on file     Medication(s):   Medication reconciliation was not performed with family, who verbalizes understanding of administration of home medications. There were no barriers to obtaining medications identified at this time. Referral to Pharm D needed: no     Current Outpatient Medications   Medication Sig    BENEFIBER, GUAR GUM, PO Take  by mouth.     Benefiber Sugar Free, dextrin, 3 gram/4 gram packet TAKE 1 PACKET DAILY BY MOUTH    erythromycin (ILOTYCIN) ophthalmic ointment APPLY SMALL AMOUNT TO BOTH EYES 2 TIMES DAILY    Systane, propylene glycoL, 0.4-0.3 % drop Administer 2 Drops to both eyes four (4) times daily. polyethylene glycol 3350 (MIRALAX PO) Take  by mouth. OTC Miralax    Shower Chair XX silvio Need during shower     No current facility-administered medications for this visit.        [facility] follow up appointment(s):   Future Appointments   Date Time Provider Luke Doe   11/15/2022  1:30 PM Camryn Anne [facility]   5/3/2023  1:30 PM Manuela Sanon MD [facility]        Non-[facility] follow up appointment(s):      Goals Addressed                   This Visit's Progress     Attends follow up appointments on schedule        11/15/22 Patient will attend all scheduled appointments through 2/15/22       Knowledge and adherence of prescribed medication (ie. action, side effects, missed dose, etc.).        11/15/22 Pt will take all medications prescribed to be evaluated on each outreach through 2/15/22

## 2022-11-21 ENCOUNTER — HOSPITAL ENCOUNTER (OUTPATIENT)
Dept: PHYSICAL THERAPY | Age: 47
End: 2022-11-21
Payer: COMMERCIAL

## 2022-12-01 ENCOUNTER — PATIENT OUTREACH (OUTPATIENT)
Dept: CASE MANAGEMENT | Age: 47
End: 2022-12-01

## 2022-12-01 NOTE — PROGRESS NOTES
Complex Case Management      Date/Time:  2022 11:07 AM    Method of communication with patient:phone    [facility] ([facility]) contacted the family by telephone to perform Ambulatory Care Coordination. Verified name and  (PHI) with family as identifiers. Provided introduction to self, and explanation of the Ambulatory Care Manager's role. Reviewed most recent clinic visit w/ family who verbalized understanding. Family given an opportunity to ask questions. Top Challenges reviewed with the Provider   N/a     Hx of Muscular dystrophy, (myotonic type 1)  Pt brother, still need form signed Reasonable accommodations for their housing authority. PCP can sign. He states he will verify that it was faxed over. I will be on the look out for it on upcoming IDR visit. Brother curious to see if they could get a DME order for suction to assist w/ dealing w/ pt's secretions. Will discuss w/ PCP. States doing ok otherwise, no other issues/questions    The family agrees to contact the PCP office or the [facility] for questions related to their healthcare. Provided contact information for future reference. Disease Specific:   N/A    Home Health Active: No    DME Active: No    Barriers to care? financial, utilization of services    Advance Care Planning:   Does patient have an Advance Directive:  not on file     Medication(s):   Medication reconciliation was not performed with family, who verbalizes understanding of administration of home medications. There were no barriers to obtaining medications identified at this time. Referral to Pharm D needed: no     Current Outpatient Medications   Medication Sig    BENEFIBER, GUAR GUM, PO Take  by mouth.     Benefiber Sugar Free, dextrin, 3 gram/4 gram packet TAKE 1 PACKET DAILY BY MOUTH    erythromycin (ILOTYCIN) ophthalmic ointment APPLY SMALL AMOUNT TO BOTH EYES 2 TIMES DAILY    Systane, propylene glycoL, 0.4-0.3 % drop Administer 2 Drops to both eyes four (4) times daily. polyethylene glycol 3350 (MIRALAX PO) Take  by mouth. OTC Miralax    Shower Chair XX silvio Need during shower     No current facility-administered medications for this visit. [facility] follow up appointment(s):   Future Appointments   Date Time Provider Luke Doe   12/2/2022  1:30 PM Perez [place], PTA [facility]   12/9/2022  1:30 PM Perez Terence, PTA [facility]   12/16/2022 12:00 PM Niranjan Travis, PT [facility]   12/23/2022  1:30 PM Perez Terence, PTA [facility]   3/27/2023  1:20 PM Leonor Frazier MD [facility] AMB   5/3/2023  1:30 PM Cecy Vasquez MD [facility] AMB        Non-[facility] follow up appointment(s):      Goals Addressed                   This Visit's Progress     Attends follow up appointments on schedule   On track     11/15/22 Patient will attend all scheduled appointments through 2/15/22       Knowledge and adherence of prescribed medication (ie. action, side effects, missed dose, etc.).    On track     11/15/22 Pt will take all medications prescribed to be evaluated on each outreach through 2/15/22

## 2022-12-02 ENCOUNTER — HOSPITAL ENCOUNTER (OUTPATIENT)
Dept: PHYSICAL THERAPY | Age: 47
Discharge: HOME | End: 2022-12-02
Payer: COMMERCIAL

## 2022-12-02 PROCEDURE — 97110 THERAPEUTIC EXERCISES: CPT

## 2022-12-02 PROCEDURE — 97112 NEUROMUSCULAR REEDUCATION: CPT

## 2022-12-02 PROCEDURE — 97530 THERAPEUTIC ACTIVITIES: CPT

## 2022-12-02 NOTE — PROGRESS NOTES
PT DAILY TREATMENT NOTE     Patient Name: Linda Leung  Date:2022  : 1975  [x]  Patient  Verified  Payor: [address] / Plan: [address] / Product Type: Managed Care Medicaid /    In time: 1:39 Out time: 2:19  Total Treatment Time (min): 40  Visit #: 2 of 8    Medicare/BCBS Only   Total Timed Codes (min): 40 1:1 Treatment Time: 40       Treatment Area: Generalized weakness [R53.1]    SUBJECTIVE  Pain Level (0-10 scale): 0/10  Any medication changes, allergies to medications, adverse drug reactions, diagnosis change, or new procedure performed?: [x] No    [] Yes (see summary sheet for update)  Subjective functional status/changes:   [] No changes reported  Brother reports no significant changes. He thinks he hasnt gotten better or worste. Brother says that pt has been doing his walking program due to being sick. Pt reports his right big toes curls under causing him to fall.      OBJECTIVE    24 min Therapeutic Activity:  [x]  See flow sheet : goal assessment    Rationale: increase strength  to improve the patients ability to perform transfers    8 min Therapeutic Exercise:  [x]  See flow sheet :    Rationale: increase strength  to improve the patients ability to perform transfers     8 min Neuromuscular Re-education:  [x]  See flow sheet :    Rationale: increase strength, improve coordination, and improve balance  to improve the patients ability to ambulate           With   [x] TE   [] TA   [] neuro   [] other: Patient Education: [x] Review HEP    [] Progressed/Changed HEP based on:   [] positioning   [] body mechanics   [] transfers   [] heat/ice application    [] other: tennis ball to roll out left plantar aspect; calf stretch on step; quad sets for home     Other Objective/Functional Measures:   TTP left bottom foot  Rhomberg foam EC: 7 sec  Ambulation around gym with multiple instances of instability requiring CGA  Backwards ambulation with increased buckling  One UE needed on // to  cone with CGA     Pain Level (0-10 scale) post treatment: 0/10    ASSESSMENT/Changes in Function: see progress note    Patient will continue to benefit from skilled PT services to modify and progress therapeutic interventions, address functional mobility deficits, address ROM deficits, address strength deficits, analyze and address soft tissue restrictions, analyze and cue movement patterns, analyze and modify body mechanics/ergonomics, assess and modify postural abnormalities, and address imbalance/dizziness to attain remaining goals. Updated Goals: to be achieved in 4 weeks:  1. Patient will be able to ambulate 100 feet without instances of instability to increase safety with home navigation. Increased instability and CGA for safety  2. Patient will ambulate backwards for 30 feet without LOB or cueing in order to demonstrate improving balance for increased ease of opening doors at home. Multiple instances of knee bucking with CGA  3. Patient will perform Romberg balance on foam with eyes closed without LOB for 30 seconds in order to demonstrate improve safety at home. (12/2/2022): 7 seconds  4. Patient will be able to retrieve a cone from the floor without LOB with SBA in order to be more independent at home with household chores. (12/2/22); one hand on bars with leg buckling and CGA    PLAN  [x]  Upgrade activities as tolerated     [x]  Continue plan of care  []  Update interventions per flow sheet       []  Discharge due to:_  []  Other:_      MALLORY Grant 12/2/2022  7:15 AM    I was present during the entire treatment, directing and participating in the treatment.    NASRA Ureña     Future Appointments   Date Time Provider Luke Doe   12/2/2022  1:30 PM Sohail Frye [facility]   12/9/2022  1:30 PM Marcos Bryan PTA [facility]   12/16/2022 12:00 PM Curtis Marshall, PT [facility]   12/23/2022  1:30 PM Waldo Macdonald Kandy Ribeiro [facility]   3/27/2023  1:20 PM Lazarus Palmer, MD [facility] BS AMB   5/3/2023  1:30 PM Dottie Cortez MD [facility] BS AMB

## 2022-12-02 NOTE — THERAPY RECERTIFICATION
[facility] - Humberto Sharp  [facility]  [phone] fax    Physical Therapy Progress Note  Patient name: Nora Parra Start of Care:  2022   Referral source: Guy Thompson MD : 1975   Medical/Treatment Diagnosis: Generalized weakness [R53.1]  Payor: [address] [place] [address] Road / Plan: [address] / Product Type: Managed Care Medicaid /  Onset Date:2022          Prior Hospitalization: see medical history Provider#: [id number]   Medications: Verified on Patient Summary List    Comorbidities:Neurological disease, Myotonic Dystrophy Type 1 Back pain, GI disease, Hearing impairment, other disorders, Visual impairment-2019- cataracts surgery  Prior Level of Function: Patient lives with brother and has a home attendant with him to assist as needed. Visits from Start of Care: 21    Missed Visits: 5    Established Goals:         Excellent           Good         Limited           None  [x] Increased ROM   []  []  [x]  []  [x] Increased Strength  []  []  [x]  []  [x] Increased Mobility  []  []  [x]  []   [x] Decreased Pain   []  []  [x]  []  [] Decreased Swelling  []  []  []  []    Key Functional Changes: limited due to decreased attendance secondary to insurance authorization and pt sickness    Updated Goals: to be achieved in 4 weeks:  1. Patient will perform Romberg balance on foam with eyes closed without LOB for 30 seconds in order to demonstrate improve safety at home. 2. Patient will be able to ambulate 100 feet without instances of instability to increase safety with home navigation. 3. Patient will be compliant with a final comprehensive home program for continued progression of strengthening and stretching to reduce fall risk and improve ease of household negotiation.      ASSESSMENT/RECOMMENDATIONS: Mr. Raj Mascorro made limited progress towards updated goals due to time out of therapy awaiting insurance authorization and then from being sick for the past two weeks. Since his time away, his brother notes limited activity or exercising and has seen a decline in his strength. The patient continues to have knee buckling into mid range squat but more extensive since sickness. He continues to require close guarding with ambulation both around the clinic and backwards due to decreased B LE strength. He grimaces with strength training but due to cognitive deficits it is hard to delineate if it is true pain or muscle activation that the patient is not used to. He demonstrates most weakness in his quadriceps and calves. He requires 1 UE support for picking up objects from the floor to decrease fall risk. Skilled PT remains medically necessary to progress pt towards a home program that his brother can assist with for consistent strengthening and stretching to aide in functional mobility and safety with ambulation, transfers and assisting with ADLs. [x]Continue therapy per initial plan/protocol at a frequency of  2-3 x per week for 4 weeks  []Continue therapy with the following recommended changes:_____________________      _____________________________________________________________________  []Discontinue therapy progressing towards or have reached established goals  []Discontinue therapy due to lack of appreciable progress towards goals  []Discontinue therapy due to lack of attendance or compliance  []Await Physician's recommendations/decisions regarding therapy  []Other:________________________________________________________________    Thank you for this referral.   Avel Herrmann, PTA 12/2/2022 2:39 PM    NOTE TO PHYSICIAN:  [address] AND   FAX TO [facility]: (21 21 09  If you are unable to process this request in 24 hours please contact our office: [phone]    I have read the above report and request that my patient continue as recommended.   I have read the above report and request that my patient continue therapy with the following changes/special instructions:____________________________________  I have read the above report and request that my patient be discharged from therapy.     Physicians signature: ______________________________Date: ______Time:______     Gina Chun MD

## 2022-12-06 ENCOUNTER — PATIENT OUTREACH (OUTPATIENT)
Dept: CASE MANAGEMENT | Age: 47
End: 2022-12-06

## 2022-12-06 NOTE — PROGRESS NOTES
Complex Case Management      Date/Time:  2022 11:07 AM    Method of communication with patient:phone    [address] ([facility]) contacted the family by telephone to perform Ambulatory Care Coordination. Verified name and  (PHI) with family as identifiers. Provided introduction to self, and explanation of the Ambulatory Care Manager's role. Reviewed most recent clinic visit w/ family who verbalized understanding. Family given an opportunity to ask questions. Top Challenges reviewed with the Provider   N/a     Hx of Muscular dystrophy, (myotonic type 1)  Pt brother, still need form signed Reasonable accommodations for their housing authority. PCP can sign. Brother states that that the form was faxed. I will be on the look out for it on upcoming IDR visit. States doing ok otherwise, no other issues/questions    The family agrees to contact the PCP office or the [facility] for questions related to their healthcare. Provided contact information for future reference. Disease Specific:   N/A    Home Health Active: No    DME Active: No    Barriers to care? financial, utilization of services    Advance Care Planning:   Does patient have an Advance Directive:  not on file     Medication(s):   Medication reconciliation was not performed with family, who verbalizes understanding of administration of home medications. There were no barriers to obtaining medications identified at this time. Referral to Pharm D needed: no     Current Outpatient Medications   Medication Sig    BENEFIBER, GUAR GUM, PO Take  by mouth. Benefiber Sugar Free, dextrin, 3 gram/4 gram packet TAKE 1 PACKET DAILY BY MOUTH    erythromycin (ILOTYCIN) ophthalmic ointment APPLY SMALL AMOUNT TO BOTH EYES 2 TIMES DAILY    Systane, propylene glycoL, 0.4-0.3 % drop Administer 2 Drops to both eyes four (4) times daily. polyethylene glycol 3350 (MIRALAX PO) Take  by mouth.  OTC Miralax    Shower Chair XX silvio Need during shower     No current facility-administered medications for this visit. [facility] follow up appointment(s):   Future Appointments   Date Time Provider Luke Brook   12/9/2022  1:30 PM Judy Perez [facility]   12/16/2022 12:00 PM Suleman Ortiz, PT [facility]   12/23/2022  1:30 PM Bria Echavarria PTA [facility]   3/27/2023  1:20 PM Allan Perkins MD [facility] BS AMB   5/3/2023  1:30 PM West Kellogg MD [facility] BS AMB        Non-[facility] follow up appointment(s):      Goals Addressed                   This Visit's Progress     Attends follow up appointments on schedule   On track     11/15/22 Patient will attend all scheduled appointments through 2/15/22       Knowledge and adherence of prescribed medication (ie. action, side effects, missed dose, etc.).    On track     11/15/22 Pt will take all medications prescribed to be evaluated on each outreach through 2/15/22

## 2022-12-09 ENCOUNTER — HOSPITAL ENCOUNTER (OUTPATIENT)
Dept: PHYSICAL THERAPY | Age: 47
Discharge: HOME | End: 2022-12-09
Payer: COMMERCIAL

## 2022-12-09 PROCEDURE — 97110 THERAPEUTIC EXERCISES: CPT

## 2022-12-09 PROCEDURE — 97530 THERAPEUTIC ACTIVITIES: CPT

## 2022-12-09 NOTE — PROGRESS NOTES
PT DAILY TREATMENT NOTE     Patient Name: Velia Bustos  Date:2022  : 1975  [x]  Patient  Verified  Payor: [address] / Plan: [address] / Product Type: Managed Care Medicaid /    In time: 1:30  Out time: 2:00  Total Treatment Time (min): 30  Visit #: 1 of 12    Medicare/BCBS Only   Total Timed Codes (min): 30 1:1 Treatment Time: 30       Treatment Area: Generalized weakness [R53.1]    SUBJECTIVE  Pain Level (0-10 scale): 0/10  Any medication changes, allergies to medications, adverse drug reactions, diagnosis change, or new procedure performed?: [x] No    [] Yes (see summary sheet for update)  Subjective functional status/changes:   [] No changes reported  Pt's brother reports he has been feeling sick with thyroid issues so hasn't been able to do much to help his brother. He notes more instances of buckling recently and difficulty with backwards walking. He is going to try to get him back to more strength training. They are looking into an apt on a second floor that will give him more opportunities to go up and down the stairs and has a walk in shower too.      OBJECTIVE    15 min Therapeutic Activity:  [x]  See flow sheet : step ups; mini squats   Rationale: increase strength  to improve the patients ability to perform transfers    15 min Therapeutic Exercise:  [x]  See flow sheet :    Rationale: increase strength  to improve the patients ability to perform transfers             With   [x] TE   [] TA   [] neuro   [] other: Patient Education: [x] Review HEP    [] Progressed/Changed HEP based on:   [] positioning   [] body mechanics   [] transfers   [] heat/ice application    [] other: tennis ball to roll out left plantar aspect; calf stretch on step; quad sets for home     Other Objective/Functional Measures:   Slight pain with prolonged incline stretch   Left quad pain(pt reports) with walking after session likely due to more use of quads during session but didn't complain of pain during session  Increased episodes throughout session of buckling to mid range  Challenged with posterior stepping and weight shifting    Pain Level (0-10 scale) post treatment: 0/10    ASSESSMENT/Changes in Function: Pt with increasing episodes of buckling and more weakness since most recent sickness. He has difficulty with backwards ambulation and returning to upright from mid range buckle without someone or something to assist him. He continues to complain of left calf pain. The brother has been struggling with his own health making it difficult to always assist him with exercises. Patient will continue to benefit from skilled PT services to modify and progress therapeutic interventions, address functional mobility deficits, address ROM deficits, address strength deficits, analyze and address soft tissue restrictions, analyze and cue movement patterns, analyze and modify body mechanics/ergonomics, assess and modify postural abnormalities, and address imbalance/dizziness to attain remaining goals. Updated Goals: to be achieved in 4 weeks:  1. Patient will perform Romberg balance on foam with eyes closed without LOB for 30 seconds in order to demonstrate improve safety at home. 2. Patient will be able to ambulate 100 feet without instances of instability to increase safety with home navigation. 3. Patient will be compliant with a final comprehensive home program for continued progression of strengthening and stretching to reduce fall risk and improve ease of household negotiation.      PLAN  [x]  Upgrade activities as tolerated     [x]  Continue plan of care  []  Update interventions per flow sheet       []  Discharge due to:_  []  Other:_      Jason Jolly PTA 12/9/2022  7:15 AM      Future Appointments   Date Time Provider Luke Doe   12/9/2022  1:30 PM Arlette Gregory PTA [facility]   12/16/2022 12:00 PM Lili Stokes PT [facility]   12/23/2022  1:30 PM Favian Mayes, PTA [facility]   3/27/2023  1:20 PM Lazaro Christensen MD [facility] BS AMB   5/3/2023  1:30 PM Chris Barnett MD [facility] BS AMB

## 2022-12-16 ENCOUNTER — HOSPITAL ENCOUNTER (OUTPATIENT)
Dept: PHYSICAL THERAPY | Age: 47
Discharge: HOME | End: 2022-12-16
Payer: COMMERCIAL

## 2022-12-16 PROCEDURE — 97110 THERAPEUTIC EXERCISES: CPT

## 2022-12-16 PROCEDURE — 97530 THERAPEUTIC ACTIVITIES: CPT

## 2022-12-16 PROCEDURE — 97112 NEUROMUSCULAR REEDUCATION: CPT

## 2022-12-16 NOTE — PROGRESS NOTES
PT DAILY TREATMENT NOTE     Patient Name: Kennedi Dollar  Date:2022  : 1975  [x]  Patient  Verified  Payor: [address] / Plan: [address] / Product Type: Managed Care Medicaid /    In time: 11:55  Out time: 12:35  Total Treatment Time (min): 40  Visit #: 2 of 12    Treatment Area: Generalized weakness [R53.1]    SUBJECTIVE  Pain Level (0-10 scale): 0/10  Any medication changes, allergies to medications, adverse drug reactions, diagnosis change, or new procedure performed?: [x] No    [] Yes (see summary sheet for update)  Subjective functional status/changes:   [] No changes reported  Pt. Reports he has been weaker lately. He denies falling but is having more episodes of stumbling. Increased episodes of legs giving out during posterior hygiene when toileting. Also having episodes of near falls stepping into shower. OBJECTIVE    22 min Therapeutic Exercise:  [x] See flow sheet :   Rationale: increase ROM and increase strength to improve the patients ability to perform ADLs    10 min Therapeutic Activity:  []  See flow sheet : cone pick ups, cone reaching   Rationale: increase strength and improve coordination  to improve the patients ability to perform ADLs     8 min Neuromuscular Re-education:  []  See flow sheet : standing balance activities   Rationale: increase strength, improve coordination, and improve balance  to improve the patients ability to ambulate           With   [x] TE   [] TA   [] neuro   [] other: Patient Education: [x] Review HEP    [] Progressed/Changed HEP based on:   [] positioning   [] body mechanics   [] transfers   [] heat/ice application    [] other:      Other Objective/Functional Measures:   Pt.  Required frequent breaks during exercises  Challenged with maintaining trunk control during rows and decreased mobility with TB extensions secondary to decreased strength  Had complaints of left quad pain after some exercises  Required more assistance with ambulation and had significant instability after PT today     Pain Level (0-10 scale) post treatment: 0/10    ASSESSMENT/Changes in Function:  pt. Is making limited progress towards goals. He continues to demonstrate instability with ambulation that worsens with fatigue and pt. Has been educated on activity modification and taking frequent breaks to reduce excessive fatigue. He continues to have episodes of his legs giving out. Pt. Continues to be compliant with HEP. Patient will continue to benefit from skilled PT services to modify and progress therapeutic interventions, address functional mobility deficits, address ROM deficits, address strength deficits, analyze and address soft tissue restrictions, analyze and cue movement patterns, analyze and modify body mechanics/ergonomics, assess and modify postural abnormalities, and address imbalance/dizziness to attain remaining goals. Progress towards goals / Updated goals:  1. Patient will perform Romberg balance on foam with eyes closed without LOB for 30 seconds in order to demonstrate improve safety at home. Not met: multiple LOB (12/16/22)  2. Patient will be able to ambulate 100 feet without instances of instability to increase safety with home navigation. 3. Patient will be compliant with a final comprehensive home program for continued progression of strengthening and stretching to reduce fall risk and improve ease of household negotiation.      PLAN  []  Upgrade activities as tolerated     [x]  Continue plan of care  []  Update interventions per flow sheet       []  Discharge due to:_  []  Other:_      Kellie Velasquez, PT 12/16/2022  7:11 AM    Future Appointments   Date Time Provider Luke Doe   12/16/2022 12:00 PM Mercy Epstein PT [facility]   12/23/2022  1:30 PM Catie Carcamo PTA [facility]   3/27/2023  1:20 PM Marie Cornejo MD [facility] BS AMB   5/3/2023  1:30 PM Jenna Chow MD [facility] BS AMB

## 2022-12-20 ENCOUNTER — PATIENT OUTREACH (OUTPATIENT)
Dept: CASE MANAGEMENT | Age: 47
End: 2022-12-20

## 2022-12-20 NOTE — PROGRESS NOTES
Complex Case Management      Date/Time:  2022 11:07 AM    Method of communication with patient:phone    [address] ([facility]) contacted the family by telephone to perform Ambulatory Care Coordination. Verified name and  (PHI) with family as identifiers. Provided introduction to self, and explanation of the Ambulatory Care Manager's role. Reviewed most recent clinic visit w/ family who verbalized understanding. Family given an opportunity to ask questions. Top Challenges reviewed with the Provider   N/a     Hx of Muscular dystrophy, (myotonic type 1)  Relayed to pt's brother that the form for reasonable accomodation was signed and faxed. He states he had just went and picked up a copy. Pt's brother states concern that the patient is developing an ingrown toenail to his Right great toe. Will forward to PCP for podiatry referral.  States doing ok otherwise, no other issues/questions    The family agrees to contact the PCP office or the [facility] for questions related to their healthcare. Provided contact information for future reference. Disease Specific:   N/A    Home Health Active: No    DME Active: No    Barriers to care? financial, utilization of services    Advance Care Planning:   Does patient have an Advance Directive:  not on file     Medication(s):   Medication reconciliation was not performed with family, who verbalizes understanding of administration of home medications. There were no barriers to obtaining medications identified at this time. Referral to Pharm D needed: no     Current Outpatient Medications   Medication Sig    BENEFIBER, GUAR GUM, PO Take  by mouth. Benefiber Sugar Free, dextrin, 3 gram/4 gram packet TAKE 1 PACKET DAILY BY MOUTH    erythromycin (ILOTYCIN) ophthalmic ointment APPLY SMALL AMOUNT TO BOTH EYES 2 TIMES DAILY    Systane, propylene glycoL, 0.4-0.3 % drop Administer 2 Drops to both eyes four (4) times daily.     polyethylene glycol 3350 (MIRALAX PO) Take  by mouth. OTC Miralax    Shower Chair XX silvio Need during shower     No current facility-administered medications for this visit. [facility] follow up appointment(s):   Future Appointments   Date Time Provider Luke Brook   12/23/2022  1:30 PM Mague Mora [facility]   3/27/2023  1:20 PM Yamilet Arciniega MD [facility] BS AMB   5/3/2023  1:30 PM Job Pinedo MD [facility] BS AMB        Non-[facility] follow up appointment(s):      Goals Addressed                   This Visit's Progress     Attends follow up appointments on schedule   On track     11/15/22 Patient will attend all scheduled appointments through 2/15/22       Knowledge and adherence of prescribed medication (ie. action, side effects, missed dose, etc.).    On track     11/15/22 Pt will take all medications prescribed to be evaluated on each outreach through 2/15/22

## 2022-12-23 ENCOUNTER — HOSPITAL ENCOUNTER (OUTPATIENT)
Dept: PHYSICAL THERAPY | Age: 47
Discharge: HOME | End: 2022-12-23
Payer: COMMERCIAL

## 2022-12-23 PROCEDURE — 97112 NEUROMUSCULAR REEDUCATION: CPT

## 2022-12-23 PROCEDURE — 97530 THERAPEUTIC ACTIVITIES: CPT

## 2022-12-23 PROCEDURE — 97110 THERAPEUTIC EXERCISES: CPT

## 2022-12-23 NOTE — THERAPY DISCHARGE
PT DISCHARGE DAILY NOTE AND [id number]    Patient name: Toni Henson Start of Care: 2022   Referral source: Ashlyn Rocha MD : 1975   Medical/Treatment Diagnosis: Generalized weakness [R53.1] Onset Date:2022          Prior Hospitalization: see medical history Provider#: [id number]   Medications: Verified on Patient Summary List    Comorbidities:Neurological disease, Myotonic Dystrophy Type 1 Back pain, GI disease, Hearing impairment, other disorders, Visual impairment-2019- cataracts surgery  Prior Level of Function: Patient lives with brother and has a home attendant with him to assist as needed. Visits from Start of Care: 24    Missed Visits: 5    Reporting Period : 2022 to 2022    Date:2022  : 1975  [x]  Patient  Verified  Payor: [address] [place] [address] / Plan: [address] / Product Type: Managed Care Medicaid /    In time:1:20  Out time:2:00  Total Treatment Time (min): 40  Visit #: 5 of 12    SUBJECTIVE  Pain Level (0-10 scale): 0/10  Any medication changes, allergies to medications, adverse drug reactions, diagnosis change, or new procedure performed?: [x] No    [] Yes (see summary sheet for update)  Subjective functional status/changes:   [] No changes reported  Brother reports trying the stairs at the current apartment and states that his brother would have fallen if he wasn't there. They have a new neurologist appt in March. He states he has noticed a change in mental health and swallowing and some toe pain as well. He had a physical the other day and the blood work checked out fine.      OBJECTIVE      15 min Therapeutic Exercise:  [x] See flow sheet :   Rationale: increase ROM, increase strength, improve coordination, improve balance, and increase proprioception to improve the patients ability to ambulate with decreased fall risk    15 min Therapeutic Activity:  [x]  See flow sheet : therapy plan   Rationale: increase ROM, increase strength, improve coordination, improve balance, and increase proprioception  to improve the patients ability to ambulate with decreased fall risk     10 min Neuromuscular Re-education:  [x]  See flow sheet :   Rationale: increase ROM, increase strength, improve coordination, improve balance, and increase proprioception  to improve the patients ability to ambulate with decreased fall risk       With   [] TE   [] TA   [] neuro   [] other: Patient Education: [x] Review HEP    [] Progressed/Changed HEP based on:   [] positioning   [] body mechanics   [] transfers   [] heat/ice application    [] other:      Other Objective/Functional Measures:   Continues with excessive buckling throughout session  Frequent rest breaks due to fatigue  Posterior LOB with balance interventions  CGA with ambulation  Educated brother on calling/emailing MD to ask about use of RW  Discussed D/C of therapy at this time to work on strength at home and the gym and come back to therapy if new neurologist has specifics to work on in therapy  Educated on towel scrunches and marble pick ups for intrinsic strengthening  Provided RTB for rows    Pain Level (0-10 scale) post treatment: 0/10    Summary of Care:  Goal:Patient will perform Romberg balance on foam with eyes closed without LOB for 30 seconds in order to demonstrate improve safety at home. Status at last note/certification: unable  Status at discharge: not met    Goal: Patient will be able to ambulate 100 feet without instances of instability to increase safety with home navigation. Status at last note/certification: constant B knee buckling  Status at discharge: not met    Goal: Patient will be compliant with a final comprehensive home program for continued progression of strengthening and stretching to reduce fall risk and improve ease of household negotiation.    Status at last note/certification: Initiated  Status at discharge: met    ASSESSMENT/Changes in Function: Mr. Lukas Lopez has made slow progress towards final goals in therapy with gap in treatments due to illness and insurance authorizations. There has been a steady decline in his physical capabilities more recently as patient appears more lethargic and has increased buckling of the knees both with standing and walking. He was advised on discussing a rolling walker use with the neurologist for fall risk. He continues to lack PF strength bilaterally to assist with stairs, standing balance and gait pattern. He lacks TKE strength to reduce buckling and is only able to engage his quads at mid range without reports of increased pain. His brother was advised in a low rep exercise program to assess pt tolerance for building strength without causing increased pain. Pt will D/C from therapy at this time due to plateau and slight decline in progress to workout independently with brother at home for UE and LE strengthening and follow up with neurologist for further diagnostic testing and planning.      Thank you for this referral!      PLAN  [x]Discontinue therapy: []Patient has reached or is progressing toward set goals      []Patient is non-compliant or has abdicated      [x]Due to lack of appreciable progress towards set Charlie 296, PTA 12/23/2022  2:13 PM

## 2023-01-05 DIAGNOSIS — L60.0 INGROWN NAIL OF GREAT TOE: Primary | ICD-10-CM

## 2023-01-17 ENCOUNTER — PATIENT OUTREACH (OUTPATIENT)
Dept: CASE MANAGEMENT | Age: 48
End: 2023-01-17

## 2023-01-17 DIAGNOSIS — R13.10 DYSPHAGIA, UNSPECIFIED TYPE: Primary | ICD-10-CM

## 2023-01-17 NOTE — PROGRESS NOTES
Complex Case Management      Date/Time:  2023 11:07 AM    Method of communication with patient:phone    [facility] ([facility]) contacted the family by telephone to perform Ambulatory Care Coordination. Verified name and  (PHI) with family as identifiers. Provided introduction to self, and explanation of the Ambulatory Care Manager's role. Reviewed most recent clinic visit w/ family who verbalized understanding. Family given an opportunity to ask questions. Top Challenges reviewed with the Provider   N/a     Hx of Muscular dystrophy, (myotonic type 1)  Per pt's brother, they had not heard from the podiatry office. Called office and they stated they had not received the referral.  Spoke to PCP's nurse, Dariel Paige, who stated she had send the fax but will send it again. Pt's brother also states request for Speech Therapy services from [address] [place] [address] who currently provides pt's PT. Passed request to Dariel Paige, who will pass to the PCP. States doing ok otherwise, no other issues/questions    The family agrees to contact the PCP office or the [facility] for questions related to their healthcare. Provided contact information for future reference. Disease Specific:   N/A    Home Health Active: No    DME Active: No    Barriers to care? financial, utilization of services    Advance Care Planning:   Does patient have an Advance Directive:  not on file     Medication(s):   Medication reconciliation was not performed with family, who verbalizes understanding of administration of home medications. There were no barriers to obtaining medications identified at this time. Referral to Pharm D needed: no     Current Outpatient Medications   Medication Sig    BENEFIBER, GUAR GUM, PO Take  by mouth.     Benefiber Sugar Free, dextrin, 3 gram/4 gram packet TAKE 1 PACKET DAILY BY MOUTH    erythromycin (ILOTYCIN) ophthalmic ointment APPLY SMALL AMOUNT TO BOTH EYES 2 TIMES DAILY    Systane, propylene glycoL, 0.4-0.3 % drop Administer 2 Drops to both eyes four (4) times daily. polyethylene glycol 3350 (MIRALAX PO) Take  by mouth. OTC Miralax    Shower Chair XX silvio Need during shower     No current facility-administered medications for this visit. [facility] follow up appointment(s):   Future Appointments   Date Time Provider Luke Doe   3/27/2023  1:20 PM Jose Sarabia MD [facility] BS AMB   5/3/2023  1:30 PM Patsie Closs, MD [facility] BS AMB        Non-[facility] follow up appointment(s):      Goals Addressed                   This Visit's Progress     Attends follow up appointments on schedule   On track     11/15/22 Patient will attend all scheduled appointments through 2/15/22       Knowledge and adherence of prescribed medication (ie. action, side effects, missed dose, etc.).    On track     11/15/22 Pt will take all medications prescribed to be evaluated on each outreach through 2/15/22

## 2023-01-30 ENCOUNTER — PATIENT OUTREACH (OUTPATIENT)
Dept: CASE MANAGEMENT | Age: 48
End: 2023-01-30

## 2023-01-30 NOTE — PROGRESS NOTES
Complex Case Management      Date/Time:  2023 11:07 AM    Method of communication with patient:phone    [facility] ([facility]) contacted the family by telephone to perform Ambulatory Care Coordination. Verified name and  (PHI) with family as identifiers. Provided introduction to self, and explanation of the Ambulatory Care Manager's role. Reviewed most recent clinic visit w/ family who verbalized understanding. Family given an opportunity to ask questions. Top Challenges reviewed with the Provider   N/a     Hx of Muscular dystrophy, (myotonic type 1)  Per brother, has an appt w/ podiatry. Also informed him that the referral for speech therapy has been placed and he should be able to start w/ his [facility] provider. States doing ok otherwise, no other issues/questions    The family agrees to contact the PCP office or the [facility] for questions related to their healthcare. Provided contact information for future reference. Disease Specific:   N/A    Home Health Active: No    DME Active: No    Barriers to care? financial, utilization of services    Advance Care Planning:   Does patient have an Advance Directive:  not on file     Medication(s):   Medication reconciliation was not performed with family, who verbalizes understanding of administration of home medications. There were no barriers to obtaining medications identified at this time. Referral to Pharm D needed: no     Current Outpatient Medications   Medication Sig    BENEFIBER, GUAR GUM, PO Take  by mouth. Benefiber Sugar Free, dextrin, 3 gram/4 gram packet TAKE 1 PACKET DAILY BY MOUTH    erythromycin (ILOTYCIN) ophthalmic ointment APPLY SMALL AMOUNT TO BOTH EYES 2 TIMES DAILY    Systane, propylene glycoL, 0.4-0.3 % drop Administer 2 Drops to both eyes four (4) times daily. polyethylene glycol 3350 (MIRALAX PO) Take  by mouth.  OTC Miralax    Shower Chair XX silvio Need during shower     No current facility-administered medications for this visit. [facility] follow up appointment(s):   No future appointments. Non-[facility] follow up appointment(s):      Goals Addressed                   This Visit's Progress     Attends follow up appointments on schedule   On track     11/15/22 Patient will attend all scheduled appointments through 2/15/22       Knowledge and adherence of prescribed medication (ie. action, side effects, missed dose, etc.).    On track     11/15/22 Pt will take all medications prescribed to be evaluated on each outreach through 2/15/22

## 2023-02-13 DIAGNOSIS — R13.10 DYSPHAGIA, UNSPECIFIED TYPE: Primary | ICD-10-CM

## 2023-02-14 DIAGNOSIS — G71.11 MYOTONIC DYSTROPHY, TYPE 1 (HCC): Primary | ICD-10-CM

## 2023-02-16 ENCOUNTER — HOSPITAL ENCOUNTER (OUTPATIENT)
Facility: HOSPITAL | Age: 48
Discharge: HOME | End: 2023-02-16
Payer: COMMERCIAL

## 2023-02-16 DIAGNOSIS — M20.21 HALLUX RIGIDUS OF RIGHT FOOT: ICD-10-CM

## 2023-02-16 DIAGNOSIS — M20.22 HALLUX RIGIDUS OF LEFT FOOT: ICD-10-CM

## 2023-02-16 PROCEDURE — 73620 X-RAY EXAM OF FOOT: CPT

## 2023-02-17 ENCOUNTER — CARE COORDINATION (OUTPATIENT)
Facility: CLINIC | Age: 48
End: 2023-02-17

## 2023-02-17 NOTE — CARE COORDINATION
Patient has graduated from the Complex Care program on 2/17/23. Patient/family has the ability to self-manage at this time. Care management goals have been completed. No further Ambulatory Care Manager follow up scheduled. Patient has Ambulatory Care Manager's contact information for any further questions, concerns, or needs.   Patients upcoming visits:    Future Appointments   Date Time Provider Bart Woods   3/27/2023  1:20 PM Claudia Pollard MD [facility] BS AMB   5/3/2023  1:30 PM Shahnaz Gurrola MD [facility] BS AMB

## 2023-03-27 ENCOUNTER — OFFICE VISIT (OUTPATIENT)
Age: 48
End: 2023-03-27
Payer: COMMERCIAL

## 2023-03-27 VITALS
SYSTOLIC BLOOD PRESSURE: 98 MMHG | WEIGHT: 113.4 LBS | BODY MASS INDEX: 20.87 KG/M2 | OXYGEN SATURATION: 98 % | HEIGHT: 62 IN | DIASTOLIC BLOOD PRESSURE: 78 MMHG | TEMPERATURE: 97.2 F | RESPIRATION RATE: 22 BRPM | HEART RATE: 56 BPM

## 2023-03-27 DIAGNOSIS — G71.11 MYOTONIC MUSCULAR DYSTROPHY (HCC): Primary | ICD-10-CM

## 2023-03-27 PROCEDURE — 99214 OFFICE O/P EST MOD 30 MIN: CPT | Performed by: STUDENT IN AN ORGANIZED HEALTH CARE EDUCATION/TRAINING PROGRAM

## 2023-03-27 PROCEDURE — 99212 OFFICE O/P EST SF 10 MIN: CPT | Performed by: STUDENT IN AN ORGANIZED HEALTH CARE EDUCATION/TRAINING PROGRAM

## 2023-03-27 RX ORDER — POLYETHYLENE GLYCOL 3350 17 G/17G
17 POWDER, FOR SOLUTION ORAL DAILY
COMMUNITY
Start: 2022-09-07 | End: 2023-09-07

## 2023-03-27 ASSESSMENT — ENCOUNTER SYMPTOMS
BACK PAIN: 1
SHORTNESS OF BREATH: 1
NAUSEA: 0
COUGH: 0
VOMITING: 0

## 2023-03-27 NOTE — PROGRESS NOTES
07/12/2022 15.7  cm Final    RV Free Wall Peak S' 07/12/2022 16  cm/s Final    LA Volume A/L 07/12/2022 22  mL Final    LA Volume 2C 07/12/2022 21  18 - 58 mL Final    LA Volume 4C 07/12/2022 16 (A)  18 - 58 mL Final    MV A Velocity 07/12/2022 0.55  m/s Final    MV E Wave Deceleration Time 07/12/2022 218.1  ms Final    MV E Velocity 07/12/2022 0.71  m/s Final    LV E' Lateral Velocity 07/12/2022 9  cm/s Final    LV E' Septal Velocity 07/12/2022 9  cm/s Final    TAPSE 07/12/2022 1.9  1.7 cm Final    Aortic Root 07/12/2022 3.3  cm Final    Fractional Shortening 2D 07/12/2022 35  28 - 44 % Final    LVIDd Index 07/12/2022 2.14  cm/m2 Final    LVIDs Index 07/12/2022 1.38  cm/m2 Final    LV RWT Ratio 07/12/2022 0.58   Final    LV Mass 2D 07/12/2022 79.8 (A)  88 - 224 g Final    LV Mass 2D Index 07/12/2022 55.0  49 - 115 g/m2 Final    MV E/A 07/12/2022 1.29   Final    E/E' Ratio (Averaged) 07/12/2022 7.89   Final    E/E' Lateral 07/12/2022 7.89   Final    E/E' Septal 07/12/2022 7.89   Final    LA Volume Index A/L 07/12/2022 15  16 - 34 mL/m2 Final    LVOT Stroke Volume Index 07/12/2022 34.0  mL/m2 Final    LVOT Area 07/12/2022 3.1  cm2 Final    LA Volume Index 2C 07/12/2022 14 (A)  16 - 34 mL/m2 Final    LA Volume Index 4C 07/12/2022 11 (A)  16 - 34 mL/m2 Final    Ao Root Index 07/12/2022 2.28  cm/m2 Final    Left Ventricular Ejection Fraction 07/12/2022 63   Final-Edited    LVEF MODALITY 07/12/2022 ECHO   Final-Edited           1. [facility]ot[facility] dyst[facility] ([facility]  - [facility] - [facility]. [place]  - [address] [place] [address]. [facility]. [place]     A 41years old male patient here for follow-up of myotonic dystrophy. Last seen in the clinic by Dr. Ariane Hodges. Referred to see a neuromuscular doctor; he was seen once and unable to continue because of insurance reasons.   Had attended physical therapy for

## 2023-04-17 ENCOUNTER — APPOINTMENT (OUTPATIENT)
Facility: HOSPITAL | Age: 48
End: 2023-04-17
Payer: COMMERCIAL

## 2023-04-17 ENCOUNTER — HOSPITAL ENCOUNTER (EMERGENCY)
Facility: HOSPITAL | Age: 48
Discharge: HOME | End: 2023-04-17
Attending: EMERGENCY MEDICINE
Payer: COMMERCIAL

## 2023-04-17 VITALS
TEMPERATURE: 97.9 F | RESPIRATION RATE: 18 BRPM | BODY MASS INDEX: 21.9 KG/M2 | DIASTOLIC BLOOD PRESSURE: 81 MMHG | SYSTOLIC BLOOD PRESSURE: 117 MMHG | WEIGHT: 116 LBS | HEIGHT: 61 IN | HEART RATE: 81 BPM | OXYGEN SATURATION: 100 %

## 2023-04-17 DIAGNOSIS — S92.515A CLOSED NONDISPLACED FRACTURE OF PROXIMAL PHALANX OF LESSER TOE OF LEFT FOOT, INITIAL ENCOUNTER: Primary | ICD-10-CM

## 2023-04-17 PROCEDURE — 73660 X-RAY EXAM OF TOE(S): CPT

## 2023-04-17 PROCEDURE — 99283 EMERGENCY DEPT VISIT LOW MDM: CPT

## 2023-04-17 ASSESSMENT — PAIN - FUNCTIONAL ASSESSMENT: PAIN_FUNCTIONAL_ASSESSMENT: 0-10

## 2023-04-17 ASSESSMENT — PAIN SCALES - GENERAL: PAINLEVEL_OUTOF10: 8

## 2023-04-18 NOTE — ED PROVIDER NOTES
Radiologist below, if available at the time of this note:    XR TOE LEFT (MIN 2 VIEWS)   Final Result   1. Proximal fourth phalangeal fracture with minimal displacement. LABS:  Labs Reviewed - No data to display    All other labs were within normal range or not returned as of this dictation. EMERGENCY DEPARTMENT COURSE and DIFFERENTIAL DIAGNOSIS/MDM:   Vitals:    Vitals:    04/17/23 2006   BP: 117/81   Pulse: 81   Resp: 18   Temp: 97.9 °F (36.6 °C)   TempSrc: Oral   SpO2: 100%   Weight: 116 lb (52.6 kg)   Height: 5' 1\" (1.549 m)         Discussion:       Poss prob fx. Nvi. Stalbe for dc and close f/up         FINAL IMPRESSION      1. Closed nondisplaced fracture of proximal phalanx of lesser toe of left foot, initial encounter          DISPOSITION/PLAN   DISPOSITION Decision To Discharge 04/17/2023 10:04:06 PM      PATIENT REFERRED TO:  [facility] EMERGENCY DEPT  [address] [facility]  [phone]  Go to   As needed, If symptoms worsen    MD Ronald Kwok [address] [place] [address]., [address] [place] [address]  [phone]    Schedule an appointment as soon as possible for a visit in 2 days  If symptoms worsen      DISCHARGE MEDICATIONS:  Discharge Medication List as of 4/17/2023 10:16 PM        Controlled Substances Monitoring:     No flowsheet data found.     (Please note that portions of this note were completed with a voice recognition program.  Efforts were made to edit the dictations but occasionally words are mis-transcribed.)    Carmella Acevedo MD (electronically signed)  Attending Emergency Physician          Osmar Lockwood MD  04/19/23 9513

## 2023-04-18 NOTE — ED TRIAGE NOTES
Caregiver reports that the patient hit his toe off the bed post causing pain and bruising. Patient is able to move the toe but has intense pain with movement. Patient took 400 mg ibuprofen prior to arriving to the ED.

## 2023-04-18 NOTE — ED NOTES
Patient up for discharge. Discharge instructions reviewed with patient and patient verbalized understanding of. Patient was discharged ambulatory.        Juan J Pugh RN  04/17/23 [id number]

## 2023-04-26 ENCOUNTER — HOSPITAL ENCOUNTER (OUTPATIENT)
Facility: HOSPITAL | Age: 48
Setting detail: RECURRING SERIES
Discharge: HOME | End: 2023-04-29
Payer: COMMERCIAL

## 2023-04-26 DIAGNOSIS — H04.123 DRY EYE SYNDROME OF BILATERAL LACRIMAL GLANDS: ICD-10-CM

## 2023-04-26 PROCEDURE — 92610 EVALUATE SWALLOWING FUNCTION: CPT

## 2023-04-26 RX ORDER — POLYETHYLENE GLYCOL 400 AND PROPYLENE GLYCOL 4; 3 MG/ML; MG/ML
SOLUTION/ DROPS OPHTHALMIC
Qty: 30 ML | Refills: 1 | Status: SHIPPED | OUTPATIENT
Start: 2023-04-26

## 2023-04-26 NOTE — THERAPY EVALUATION
[facility] [place] [employer] [facility]  [place]  [address] [place] [address]  [phone] fax    Plan of Care/ Statement of Necessity for Speech Therapy Services    Patient name: Allan Pascual Start of Care: 2023   Referral source: Olman Sal MD : 1975    Medical Diagnosis: Dysphagia [R13.10]  Payor: [facility] [address] / Plan: [address] / Product Type: *No Product type* /  Onset [id number]    Treatment Diagnosis: Dysphagia   Prior Hospitalization: see medical history Provider#: [id number]   Medications: Verified on Patient summary List    Comorbidities: Myotonic muscular dystrophy   Prior Level of Function: [employer]       The Plan of Care and following information is based on the information from the initial evaluation. Assessment/ key information: Based on the objective information below, the patient presents with mild-moderate oral dysphagia and possible pharyngeal and esophageal dysphagia. Patient presents today with brother who is primary caregiver and health historian. Patient and brother report that he frequently has coughing spells upon swallowing both food and drink. Brother reports that there have episodes so intense that they result in oral or nasal regurgitation and that he has had to finger sweep debris from patient's oropharynx. He reports that the patient often eats too quickly and tries to talk with his mouth full. He also had difficulty with pills feeling like they are getting stuck in his throat, so he is now taking his vitamins in gummy form. Currently on regular diet and thin liquids, however brother reports that he tries to keep foods wet/moist.     Oral mechanism revealed patient with some missing dentition and moderately reduced lingual ROM. Tongue is fissured. Labial seal and ROM were Wfl. Lingual strength appeared [employer].  The patient tolerated PO trials of thin liquid +/- straw, mixed consistency solid (fruit cup), and regular

## 2023-04-26 NOTE — PROGRESS NOTES
Lingual base of tongue  [] Rinse mouth/expel after meal   [] Vocal Fold Exercises (f)  [] Alternate liquid swallows every _ bites (c)  [] Falsetto/laryngeal elevation exercises (f)  [] Discourage liquid wash between bites (c)  [] Thermal application (c,f)  [] Multiple swallows     [] Sour bolus (f)  [] Patient needs cues     [] Cold bolus (f)  [] Patient does not need cues   [] Pharyngeal exercise (f)  [] [place] Bracket (c,f)    [] Breath hold  [] Encourage/stimulate lip closure (c)   [] Effortful Swallow (c,f)  [x] Empty mouth before next bite (c)   [] Tongue base retraction  [x] Cue patient to slow down (c)    [] Tongue hold  [x] Encourage coughing (c)    [] Laryngeal closure  []Chin tuck (c)  []Head turn  (c)  []Head turn , chin tuck (c)    Patient/Caregiver instruction/education: [] Review HEP    [] Progressed/Changed    HEP/Handouts given: aspiration precautions, compensatory strategies, soft foods handout       Pain Level (0-10 scale) post treatment: 0    ASSESSMENT  [x]  See Plan of Care    PLAN  []  Upgrade activities as tolerated     [x]  Continue plan of care  []  Discharge due to:__  [] Other:__    APOLLO Buitrago 4/26/2023  4:25 PM

## 2023-04-28 ENCOUNTER — HOSPITAL ENCOUNTER (OUTPATIENT)
Facility: HOSPITAL | Age: 48
Setting detail: RECURRING SERIES
Discharge: HOME | End: 2023-05-01
Payer: COMMERCIAL

## 2023-04-28 PROCEDURE — 97166 OT EVAL MOD COMPLEX 45 MIN: CPT

## 2023-04-28 PROCEDURE — 97162 PT EVAL MOD COMPLEX 30 MIN: CPT

## 2023-05-23 ENCOUNTER — HOSPITAL ENCOUNTER (OUTPATIENT)
Facility: HOSPITAL | Age: 48
Setting detail: RECURRING SERIES
Discharge: HOME | End: 2023-05-26
Payer: COMMERCIAL

## 2023-05-23 PROCEDURE — 97535 SELF CARE MNGMENT TRAINING: CPT

## 2023-05-23 PROCEDURE — 97110 THERAPEUTIC EXERCISES: CPT

## 2023-05-23 PROCEDURE — 97530 THERAPEUTIC ACTIVITIES: CPT

## 2023-05-23 PROCEDURE — 92526 ORAL FUNCTION THERAPY: CPT

## 2023-05-23 NOTE — PROGRESS NOTES
OCCUPATIONAL THERAPY - DAILY TREATMENT NOTE    Patient Name: Liliana Davis    Date: 2023    : 1975  Insurance: Payor: [id number] / [facility] [address] / Product Type: *No Product type* /      Patient  verified Yes     Visit #   Current / Total 2 15   Time   In / Out 120 200   Total Treatment Time 40   Pain   In / Out 0 0   Subjective Functional Status/Changes: I can do things by myself. Changes to:  Meds, Allergies, Med Hx, Sx Hx? If yes, update Summary List no       TREATMENT AREA =  Generalized muscle weakness [M62.81]    OBJECTIVE    Therapeutic Procedures:   Tx Min Billable or 1:1 Min (if diff from Tx Min) Procedure, Rationale, Specifics   20  96349 Self Care/Home Management (timed):  improve patient knowledge and understanding of home safety, activity modification, and physical therapy expectations, procedures and progression  to maximize progress  (see flow sheet as applicable)    Donning/doffing shoes with shoe horn  Tying shoe lace practice while seated in chair and on seated on floor     10  02698 Therapeutic Exercise (timed):  increase ROM, strength, coordination, and proprioception to increase strength (see flow sheet as applicable)    Mixing yellow and red putty for home use     10  02236 Therapeutic Activity (timed):  use of dynamic activities replicating functional movements to maximize progress (see flow sheet as applicable)    Education on progressing of therapy this round and ADLs/IADLs                  40  MC BC Totals Reminder: bill using total billable min of TIMED therapeutic procedures (example: do not include dry needle or estim unattended, both untimed codes, in totals to left)  8-22 min = 1 unit; 23-37 min = 2 units; 38-52 min = 3 units; 53-67 min = 4 units; 68-82 min = 5 units   Total Total         Billed concurrently with other treatments Patient Education:  Reviewed HEP     Objective Information/Functional Measures/Assessment    Pt provided

## 2023-05-23 NOTE — PROGRESS NOTES
ST DAILY TREATMENT NOTE    Patient Name: Ariane Kulkarni  Date:2023  : 1975  [x]  Patient  Verified  Payor: Payor: [address] / Plan: [address] / Product Type: *No Product type* /   In time:2:00  Out time:240  Total Treatment Time (min): 40  Visit #: 2 of 16  *PN due 23*    Treatment Diagnosis: Dysphagia [R13.10]    SUBJECTIVE  Pain Level (0-10 scale): 0  Any medication changes, allergies to medications, adverse drug reactions, diagnosis change, or new procedure performed?: [x] No    [] Yes (see summary sheet for update)    Subjective functional status/changes:   [] No changes reported  Pt presents to tx this date accompanied by his brother. Advised them that I resent request for MBS, hopefully will receive soon. Pt reports doing okay with his swallowing. He voices concern re his fluency and intelligibility. Pt's brother reports his brother has rapid intake, frequently talks with food in his mouth and is essentially coughing with all meals. Unable to report his endurance with meals, though he reports typically he eats 1 large meal daily and has snacks x2 additionally. OBJECTIVE  Treatment provided includes:  Increase/Improve:  []  Voice Quality []  Cognitive Linguistic Skills []  Laryngeal/Pharyngeal Exercises   []  Vocal Loudness []  Reading Comprehension []  Swallowing Skills    []  Vocal Cord Function []  Auditory Comprehension []  Oral Motor Skills   []  Resonance []  Writing Skills []  Compensatory strategies    []  Speech Intelligibility []  Expressive Language []  Attention   []  Breath Support/Coord.  []  Receptive language []  Memory   []  Articulation []  Safety Awareness []    []  Fluency []  Word Retrieval []        Treatment Provided:  -pt/caregiver educ  -comp strategies  -po trials       Patient/Caregiver  Education: [x] Review HEP      HEP/Handouts given: comp strategy placemat     Pain Level (0-10 scale) post treatment: 0    ASSESSMENT   []

## 2023-05-25 ENCOUNTER — HOSPITAL ENCOUNTER (OUTPATIENT)
Facility: HOSPITAL | Age: 48
Setting detail: RECURRING SERIES
Discharge: HOME | End: 2023-05-28
Payer: COMMERCIAL

## 2023-05-25 PROCEDURE — 97110 THERAPEUTIC EXERCISES: CPT

## 2023-05-25 PROCEDURE — 97530 THERAPEUTIC ACTIVITIES: CPT

## 2023-05-25 PROCEDURE — 97112 NEUROMUSCULAR REEDUCATION: CPT

## 2023-05-31 ENCOUNTER — APPOINTMENT (OUTPATIENT)
Facility: HOSPITAL | Age: 48
End: 2023-05-31
Payer: COMMERCIAL

## 2023-06-06 ENCOUNTER — APPOINTMENT (OUTPATIENT)
Facility: HOSPITAL | Age: 48
End: 2023-06-06
Payer: COMMERCIAL

## 2023-06-06 DIAGNOSIS — G71.11 MYOTONIC MUSCULAR DYSTROPHY (HCC): Primary | ICD-10-CM

## 2023-06-07 ENCOUNTER — HOSPITAL ENCOUNTER (OUTPATIENT)
Facility: HOSPITAL | Age: 48
Setting detail: RECURRING SERIES
Discharge: HOME | End: 2023-06-10
Payer: COMMERCIAL

## 2023-06-07 PROCEDURE — 97535 SELF CARE MNGMENT TRAINING: CPT

## 2023-06-07 PROCEDURE — 97110 THERAPEUTIC EXERCISES: CPT

## 2023-06-07 PROCEDURE — 97530 THERAPEUTIC ACTIVITIES: CPT

## 2023-06-07 PROCEDURE — 97112 NEUROMUSCULAR REEDUCATION: CPT

## 2023-06-07 NOTE — PROGRESS NOTES
OCCUPATIONAL THERAPY - DAILY TREATMENT NOTE    Patient Name: Lauren Win    Date: 2023    : 1975  Insurance: [facility] / Plan: [facility] [address] / Product Type: *No Product type* /      Patient  verified Yes     Visit #   Current / Total 3 15   Time   In / Out 1040 1120   Total Treatment Time 40   Pain   In / Out 0 0   Subjective Functional Status/Changes: Patient reporting he had fall recently in bathtub when standing to perform bathing      TREATMENT AREA =  Generalized muscle weakness [M62.81]    OBJECTIVE    Therapeutic Procedures:   Tx Min Billable or 1:1 Min (if diff from Tx Min) Procedure, Rationale, Specifics   18  53468 Therapeutic Exercise (timed):  increase ROM, strength, coordination, and proprioception to  (see flow sheet as applicable)    Recheck for PN  Yellow Therabar: 3 ways: 15x each   1 in peg removal: 15# using BUE, 50x      12  38731 Therapeutic Activity (timed):  use of dynamic activities replicating functional movements to improve ability to manipulate small items  (see flow sheet as applicable)    Recheck for PN    1 in peg placement: Right/left alternating- 25x each hand 15#     10  12957 Self Care/Home Management (timed):  improve patient knowledge and understanding of posture/ergonomics, home safety, activity modification, and physical therapy expectations, procedures and progression  to improve functional independence and safety  (see flow sheet as applicable)    Recheck for PN                        40 40 MC BC Totals Reminder: bill using total billable min of TIMED therapeutic procedures (example: do not include dry needle or estim unattended, both untimed codes, in totals to left)  8-22 min = 1 unit; 23-37 min = 2 units; 38-52 min = 3 units; 53-67 min = 4 units; 68-82 min = 5 units   Total Total             Billed concurrently with other treatments Patient Education:  Reviewed HEP     Objective Information/Functional
clothing   Current Status (6/7/2023): ongoing Min A     3. Patient and caregiver will be familiar with safety strategies for ADLs/IADLs to promote proper body mechanics and improve performance in daily tasks. Status at IE: Not yet initiated   Current Status (6/7/2023): Progressing       Non-Medicare, can change goals, can adjust or add frequency duration, no signature required      New Goals to be achieved in __12__ treatments    Short Term Goals: To be accomplished in 4-8 treatments  Patient/caregiver will be familiar with HEP to address and maintain ROM and strengthening in order to improve performance in ADLs/IADLs. Status at PN (6/7/2023):  Limited carryover, goal to be carried over      2. Patient/caregiver will be familiar with implementing activity modification strategies into daily routine to improve performance in ADLs/IADLs. Status at PN (6/7/2023): some carryover, improving      Long Term Goals: To be accomplished in 8-15 treatments  Patient will be able to safely participate in bathing with Min A. Status at PN (6/7/2023):Goal to be continued due to limited carryover     2. Patient will be able to don/doff sweat shirt with no more than Min A. Status at PN (6/7/2023): ongoing Min A     3. Patient and caregiver will be familiar with safety strategies for ADLs/IADLs to promote proper body mechanics and improve performance in daily tasks. Status at PN (6/7/2023):Progressing    Frequency / Duration:   Patient to be seen   2   times per week for   12    treatments:    RECOMMENDATIONS  We would like to continue therapy for progress to goals stated above. Continue per initial Plan of Care. If you have any questions/comments please contact us directly. Thank you for allowing us to assist in the care of your patient.     [address], MICHELLE, PARADA/L       6/7/2023       11:35 AM

## 2023-06-07 NOTE — PROGRESS NOTES
[facility] BC Totals Reminder: bill using total billable min of TIMED therapeutic procedures (example: do not include dry needle or estim unattended, both untimed codes, in totals to left)  8-22 min = 1 unit; 23-37 min = 2 units; 38-52 min = 3 units; 53-67 min = 4 units; 68-82 min = 5 units   Total Total     [x]  Patient Education billed concurrently with other procedures   [x] Review HEP    [] Progressed/Changed HEP, detail:    [] Other detail:       Objective Information/Functional Measures/Assessment  HEP compliance: daily   FOTO: 25/100  Standing unsupported: 27 seconds  Bed mobility: rolling independent; sit to supine independent  Right side lying to sit Min A for head control  Sit to stand: posterior thighs to chair with instability  Tub transfers: Min A to reduce fall for stepping into/out of the tub   Stairs: step to step with B HR with CGA    See Progress note. Patient will continue to benefit from skilled PT / OT services to modify and progress therapeutic interventions, analyze and address functional mobility deficits, analyze and address ROM deficits, analyze and address strength deficits, analyze and address soft tissue restrictions, analyze and cue for proper movement patterns, analyze and modify for postural abnormalities, analyze and address imbalance/dizziness, and instruct in home and community integration to address functional deficits and attain remaining goals.     Progress toward goals / Updated goals:  [x]  See Progress Note/Recertification    PLAN  Yes  Continue plan of care  []  Upgrade activities as tolerated  []  Discharge due to :  []  Other:    Darius Bell PTA    6/7/2023    7:51 AM    Future Appointments   Date Time Provider [place]   6/7/2023 10:40 AM MICHELLE Desai [facility]   6/7/2023 11:20 AM Darius Bell PTA [facility]   6/9/2023  9:20 AM Darius Bell PTA [facility]   6/9/2023 10:00 AM APOLLO Rodriguez [facility]   6/9/2023 10:40 AM Montaukmarce Milner
exiting/entering the home. Status at last eval: step to pattern with CGA and verbal cueing to keep knees soft using B HR    4. Patient will perform sit to stand transfer with no more than supervision without LOB to improve independence with getting out of his bed in the morning. Status at last eval: CGA to reduce posterior LOB and cues to reduce genu recurvatum    5. Patient will perform tub transfers in/out with SBA to improve independence with entering/exiting his tub to decrease fall risk  Status at last eval: Min A for balance to get in/out of the tub then sits to shower chair for independent bathing    Frequency / Duration:   Patient to be seen   2   times per week for   6    weeks:    RECOMMENDATIONS  We would like to continue therapy for progress to goals stated above. Continue per initial Plan of Care. If you have any questions/comments please contact us directly. Thank you for allowing us to assist in the care of your patient.     oJsie Cummings, PTA       6/7/2023       7:57 AM

## 2023-06-16 ENCOUNTER — HOSPITAL ENCOUNTER (OUTPATIENT)
Facility: HOSPITAL | Age: 48
Setting detail: RECURRING SERIES
End: 2023-06-16
Payer: COMMERCIAL

## 2023-06-19 ENCOUNTER — APPOINTMENT (OUTPATIENT)
Facility: HOSPITAL | Age: 48
End: 2023-06-19
Payer: COMMERCIAL

## 2023-06-19 ENCOUNTER — HOSPITAL ENCOUNTER (OUTPATIENT)
Facility: HOSPITAL | Age: 48
Setting detail: RECURRING SERIES
End: 2023-06-19
Payer: COMMERCIAL

## 2023-06-23 ENCOUNTER — HOSPITAL ENCOUNTER (OUTPATIENT)
Facility: HOSPITAL | Age: 48
Setting detail: RECURRING SERIES
Discharge: HOME | End: 2023-06-26
Payer: COMMERCIAL

## 2023-06-23 PROCEDURE — 97530 THERAPEUTIC ACTIVITIES: CPT

## 2023-06-23 PROCEDURE — 97110 THERAPEUTIC EXERCISES: CPT

## 2023-06-23 PROCEDURE — 97112 NEUROMUSCULAR REEDUCATION: CPT

## 2023-06-23 PROCEDURE — 97535 SELF CARE MNGMENT TRAINING: CPT

## 2023-06-27 ENCOUNTER — HOSPITAL ENCOUNTER (OUTPATIENT)
Facility: HOSPITAL | Age: 48
Setting detail: RECURRING SERIES
Discharge: HOME | End: 2023-06-30
Payer: COMMERCIAL

## 2023-06-27 PROCEDURE — 92507 TX SP LANG VOICE COMM INDIV: CPT

## 2023-06-27 PROCEDURE — 97535 SELF CARE MNGMENT TRAINING: CPT

## 2023-06-27 PROCEDURE — 92526 ORAL FUNCTION THERAPY: CPT

## 2023-06-27 PROCEDURE — 97112 NEUROMUSCULAR REEDUCATION: CPT

## 2023-06-27 PROCEDURE — 97530 THERAPEUTIC ACTIVITIES: CPT

## 2023-06-27 PROCEDURE — 97110 THERAPEUTIC EXERCISES: CPT

## 2023-06-30 ENCOUNTER — HOSPITAL ENCOUNTER (OUTPATIENT)
Facility: HOSPITAL | Age: 48
Setting detail: RECURRING SERIES
End: 2023-06-30
Payer: COMMERCIAL

## 2023-06-30 PROCEDURE — 97110 THERAPEUTIC EXERCISES: CPT

## 2023-06-30 PROCEDURE — 97530 THERAPEUTIC ACTIVITIES: CPT

## 2023-06-30 PROCEDURE — 97112 NEUROMUSCULAR REEDUCATION: CPT

## 2023-06-30 PROCEDURE — 97535 SELF CARE MNGMENT TRAINING: CPT

## 2023-06-30 PROCEDURE — 92507 TX SP LANG VOICE COMM INDIV: CPT

## 2023-07-27 ENCOUNTER — OFFICE VISIT (OUTPATIENT)
Age: 48
End: 2023-07-27
Payer: COMMERCIAL

## 2023-07-27 VITALS
DIASTOLIC BLOOD PRESSURE: 70 MMHG | HEART RATE: 70 BPM | SYSTOLIC BLOOD PRESSURE: 98 MMHG | RESPIRATION RATE: 20 BRPM | WEIGHT: 114.2 LBS | TEMPERATURE: 98 F | HEIGHT: 62 IN | OXYGEN SATURATION: 96 % | BODY MASS INDEX: 21.02 KG/M2

## 2023-07-27 DIAGNOSIS — G71.11 MYOTONIC MUSCULAR DYSTROPHY (HCC): Primary | ICD-10-CM

## 2023-07-27 PROCEDURE — 99214 OFFICE O/P EST MOD 30 MIN: CPT | Performed by: STUDENT IN AN ORGANIZED HEALTH CARE EDUCATION/TRAINING PROGRAM

## 2023-07-27 ASSESSMENT — ENCOUNTER SYMPTOMS
COUGH: 0
VOMITING: 0
BACK PAIN: 1
SHORTNESS OF BREATH: 1

## 2023-07-27 NOTE — PROGRESS NOTES
Loc Machado is a 41 y.o. male . presents for Follow-up (Follow-up on referral to PT/OT/ST)  A 41years old male patient here for follow-up of myotonic dystrophy. He was brought by his brother who gives most of the history. Last seen in the clinic in March 2023. Referred for physical and Occupational Therapy. His brother is his home health care provider [paid]. Also trying to get additional help. Unable to maintain PT/OT follow-up; noncompliance. Symptoms are about the same. When he is outside, uses a wheelchair. He is able to stand for some time when he is at home; can go to the bathroom. But his legs do not buckle; might fall. Comes and for long time. Has difficulty opening bottle tops with his hands. Might have difficulty peeling; able to use scissors. No worsening myoclonia. He is able to relate his. Had barium swallow evaluation; had some residual fluid tested substance. His brother mentioned that when he coughs, the food comes out. He head drops back. 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 Last seen in the clinic in March 2022 by Dr. Leyla Sanchez. He came today with his brother. Has been having symptoms since was very young. Has difficulty keeping his head up; his head drops backwards when he was young. Had intellectual disability; attended special education. Started to have weakness as an adult. Progressive difficulty ambulation. Currently, is able to stand and able to walk short distances without any support. His legs shake; walks slowly. He is a house he holds onto the wall. Able to make it to the bathroom. Has a wheelchair for long distances. His left calf is bigger; also has pain. Also complains of pain over the left foot. He is unable to bend his toes. He has some difficulty releasing from his hands; his brother states that it is occasionally. He has difficulty lifting his upper extremities above the shoulder.   Has

## 2023-08-04 ENCOUNTER — TELEPHONE (OUTPATIENT)
Facility: HOSPITAL | Age: 48
End: 2023-08-04

## 2023-09-14 NOTE — PROGRESS NOTES
1. \"Have you been to the ER, urgent care clinic since your last visit? Hospitalized since your last visit? \"   Aj Martinez Screen for tube placement in right ear to drain fluid LOV: 9/12/23. 2. \"Have you seen or consulted any other health care providers outside of the [address] [place] [address] since your last visit? \" Three tooth extractions LOV: 8/14/23  - [facility]    3. For patients aged 43-73: Has the patient had a colonoscopy / FIT/ Cologuard?  No    Chief Complaint   Patient presents with    Annual Exam     Physical for department of [address] [place] [address]

## 2023-09-15 ENCOUNTER — OFFICE VISIT (OUTPATIENT)
Age: 48
End: 2023-09-15
Payer: COMMERCIAL

## 2023-09-15 VITALS
OXYGEN SATURATION: 95 % | BODY MASS INDEX: 22.05 KG/M2 | HEIGHT: 62 IN | WEIGHT: 119.8 LBS | SYSTOLIC BLOOD PRESSURE: 100 MMHG | HEART RATE: 69 BPM | TEMPERATURE: 96.9 F | RESPIRATION RATE: 16 BRPM | DIASTOLIC BLOOD PRESSURE: 70 MMHG

## 2023-09-15 DIAGNOSIS — Z00.00 ENCOUNTER FOR WELL ADULT EXAM WITHOUT ABNORMAL FINDINGS: Primary | ICD-10-CM

## 2023-09-15 DIAGNOSIS — G71.11 MYOTONIC DYSTROPHY, TYPE 1 (HCC): ICD-10-CM

## 2023-09-15 DIAGNOSIS — D72.819 LEUKOPENIA, UNSPECIFIED TYPE: ICD-10-CM

## 2023-09-15 DIAGNOSIS — R14.0 BLOATING: ICD-10-CM

## 2023-09-15 DIAGNOSIS — R13.10 DYSPHAGIA, UNSPECIFIED TYPE: ICD-10-CM

## 2023-09-15 DIAGNOSIS — H53.9 VISION CHANGES: ICD-10-CM

## 2023-09-15 DIAGNOSIS — K59.00 CONSTIPATION, UNSPECIFIED CONSTIPATION TYPE: ICD-10-CM

## 2023-09-15 DIAGNOSIS — R26.89 BALANCE PROBLEM: ICD-10-CM

## 2023-09-15 PROCEDURE — 99396 PREV VISIT EST AGE 40-64: CPT | Performed by: FAMILY MEDICINE

## 2023-09-15 RX ORDER — OFLOXACIN 3 MG/ML
SOLUTION/ DROPS OPHTHALMIC
COMMUNITY
Start: 2023-09-12

## 2023-09-15 RX ORDER — FAMOTIDINE 20 MG/1
20 TABLET, FILM COATED ORAL 2 TIMES DAILY PRN
Qty: 180 TABLET | Refills: 0 | Status: SHIPPED | OUTPATIENT
Start: 2023-09-15

## 2023-09-15 RX ORDER — DIAPER,BRIEF,ADULT, DISPOSABLE
EACH MISCELLANEOUS
COMMUNITY
Start: 2023-09-14

## 2023-09-15 RX ORDER — POLYETHYLENE GLYCOL 3350 17 G/17G
POWDER, FOR SOLUTION ORAL
COMMUNITY

## 2023-09-15 SDOH — ECONOMIC STABILITY: FOOD INSECURITY: WITHIN THE PAST 12 MONTHS, THE FOOD YOU BOUGHT JUST DIDN'T LAST AND YOU DIDN'T HAVE MONEY TO GET MORE.: NEVER TRUE

## 2023-09-15 SDOH — ECONOMIC STABILITY: INCOME INSECURITY: HOW HARD IS IT FOR YOU TO PAY FOR THE VERY BASICS LIKE FOOD, HOUSING, MEDICAL CARE, AND HEATING?: NOT VERY HARD

## 2023-09-15 SDOH — ECONOMIC STABILITY: FOOD INSECURITY: WITHIN THE PAST 12 MONTHS, YOU WORRIED THAT YOUR FOOD WOULD RUN OUT BEFORE YOU GOT MONEY TO BUY MORE.: NEVER TRUE

## 2023-09-15 SDOH — ECONOMIC STABILITY: HOUSING INSECURITY
IN THE LAST 12 MONTHS, WAS THERE A TIME WHEN YOU DID NOT HAVE A STEADY PLACE TO SLEEP OR SLEPT IN A SHELTER (INCLUDING NOW)?: NO

## 2023-09-15 ASSESSMENT — PATIENT HEALTH QUESTIONNAIRE - PHQ9
SUM OF ALL RESPONSES TO PHQ QUESTIONS 1-9: 0
2. FEELING DOWN, DEPRESSED OR HOPELESS: 0
SUM OF ALL RESPONSES TO PHQ QUESTIONS 1-9: 0
SUM OF ALL RESPONSES TO PHQ9 QUESTIONS 1 & 2: 0
1. LITTLE INTEREST OR PLEASURE IN DOING THINGS: 0

## 2023-09-15 NOTE — PROGRESS NOTES
Well Adult Note  Name: Alma Delia Helton Date: 9/15/2023   MRN: [id number] Sex: Male   Age: 41 y.o. Ethnicity:  / Adelaidachito Vazquez   : 1975 Race: White (non-)      Jossyshamar ContrerasBill is here for well adult exam.  History:  Here for physical.  Sitting in a wheelchair without any discomfort. History of myotonic dystrophy. Does have home care since last week during daytime and brother present at nighttime to help him. Ambulating difficulty due to generalize muscle weakness in lower ext. Unable to hold cane or walker . Use wall support in presence of care taker. Swallowing difficulties. Unremarkable GI/[place]. Awaiting swallow study done. Reviewed records in the chart. Gastroparesis. Following ENT regarding fluid behind TM. Had tube placed over rt ear and now plan for left side. Discuss with brother to communicate with ENT specialist and GI for need for EGD as he will need anesthesia for both procedure. Weight and appetite has been stable. No recent choking episode. Has been following dentist regularly per the recommendation. Has been following ophthalmology for his chronic vision changes. Per brother noted bad mouth breath on and off. Notice bloating. Keeping food and water in mouth before swallow etc.   Could be the reason. For now advise to rinse mouth frequently. Also treating with famotidine for possible acid reflux. Advise to take as needed. Patient is sitting comfortably without any acute distress. Able to answer all the questions appropriately. Patient and mother both did not have any specific concerns. No behavioral changes. Reviewed labs. Borderline low WBC. No cold cough wheezing. No fever. Will observe. Vitals been stable.   CMP:  Lab Results   Component Value Date/Time     2022 03:06 PM    K 4.4 2022 01:15 PM     2022 03:06 PM    CO2 33 2022 03:06 PM    BUN 13 2022 03:06 PM    CREATININE 0.41 2022 03:06 PM

## 2024-01-31 ENCOUNTER — OFFICE VISIT (OUTPATIENT)
Age: 49
End: 2024-01-31
Payer: COMMERCIAL

## 2024-01-31 VITALS
SYSTOLIC BLOOD PRESSURE: 115 MMHG | HEART RATE: 67 BPM | BODY MASS INDEX: 24.92 KG/M2 | DIASTOLIC BLOOD PRESSURE: 72 MMHG | HEIGHT: 61 IN | TEMPERATURE: 97.6 F | WEIGHT: 132 LBS | RESPIRATION RATE: 19 BRPM

## 2024-01-31 DIAGNOSIS — G71.11 MYOTONIC MUSCULAR DYSTROPHY (HCC): Primary | ICD-10-CM

## 2024-01-31 PROCEDURE — 99212 OFFICE O/P EST SF 10 MIN: CPT

## 2024-01-31 PROCEDURE — 99212 OFFICE O/P EST SF 10 MIN: CPT | Performed by: PSYCHIATRY & NEUROLOGY

## 2024-01-31 NOTE — PROGRESS NOTES
Critical access hospital  [address], [place] [address]  Office:  [phone]  Fax: [phone]      Neurology Follow Up Note    Reason for visit: Presurgical evaluation (Kevin Naidu MD, ENT, [facility])    Patient is a 48-year-old man with a diagnosis of myotonic dystrophy.  He is here today with his brother for a discussion about presurgical clearance.  Several years ago he was found to have a cholesteatoma on the left side.  His brother thinks that the first scan which show this was in 2020.  He had another scan in 2023, the results of which are unclear.  He has seen Dr. Naidu who has recommended surgical treatment.  There is concern about progressive hearing loss on the left side.  Dr. Naidu has recommended cardiology evaluation, pulmonology evaluation, and a neurological evaluation presurgical.  Patient's brother tells me that the cardiology evaluation has been done and revealed no issues.  Pulmonary evaluation apparently was inconclusive because the patient could not comply with instructions for pulmonary function testing.    Review of systems: Patient has problems with swallowing, and frequently chokes on food, he snores heavily, and has trouble holding his head up.  He can walk, but his lower extremities are weak.    The exam shows that he is in a wheelchair.  He is alert.  He has a myotonic facies.  Tongue is midline, there is weakness of flexion and extension of his head, and weakness with mild muscle atrophy in his upper extremities.    Assessment: The major issues appear to me to be related to possible complications from anesthesia and intubation; general anesthesia has been recommended by his surgeon.  My recommendation to the patient's brother was that he have a anesthesia evaluation prior to the actual day of surgery, which he said is to be an outpatient procedure.     Everton Tabares MD

## 2024-03-19 ENCOUNTER — PROCEDURE VISIT (OUTPATIENT)
Age: 49
End: 2024-03-19
Payer: COMMERCIAL

## 2024-03-19 ENCOUNTER — TELEPHONE (OUTPATIENT)
Age: 49
End: 2024-03-19

## 2024-03-19 VITALS — BODY MASS INDEX: 24.29 KG/M2 | HEIGHT: 62 IN | WEIGHT: 132 LBS

## 2024-03-19 DIAGNOSIS — G71.00 MUSCULAR DYSTROPHY, UNSPECIFIED (HCC): Primary | ICD-10-CM

## 2024-03-19 PROCEDURE — 94727 GAS DIL/WSHOT DETER LNG VOL: CPT | Performed by: INTERNAL MEDICINE

## 2024-03-19 PROCEDURE — 94729 DIFFUSING CAPACITY: CPT | Performed by: INTERNAL MEDICINE

## 2024-03-19 PROCEDURE — 94060 EVALUATION OF WHEEZING: CPT | Performed by: INTERNAL MEDICINE

## 2024-03-19 NOTE — TELEPHONE ENCOUNTER
Patient had PFT 3.19.24 for medical clearance for the use of anesthesia because pt has MMD Type1.   Patient's brother /Joseph Abdul is requesting a call call back with results, then have results forwarded to Dr Kevin Reyez.

## 2024-03-22 ENCOUNTER — TELEPHONE (OUTPATIENT)
Age: 49
End: 2024-03-22

## 2024-03-22 NOTE — TELEPHONE ENCOUNTER
Received fax from [facility] for TYMPANOPLASTY WITH MASTOIDECTOMY AND OSSICULAR CHAIN RECONSTRUCTION. Procedure date 4/5/24. Will discuss with Dr. Vaca.

## 2024-03-26 NOTE — PROGRESS NOTES
\"Have you been to the ER, urgent care clinic since your last visit?  Hospitalized since your last visit?\"    NO    “Have you seen or consulted any other health care providers outside of [facility] since your last visit?”    Dr. Kevin Naidu LOV: 3/14/24. [facility] LOV: 3/15/24        “Have you had a colorectal cancer screening such as a colonoscopy/FIT/Cologuard?    NO    No colonoscopy on file  No cologuard on file  No FIT/FOBT on file   No flexible sigmoidoscopy on file     Chief Complaint   Patient presents with    Pre-op Exam     [facility]         Click Here for Release of Records Request

## 2024-03-27 ENCOUNTER — OFFICE VISIT (OUTPATIENT)
Age: 49
End: 2024-03-27
Payer: COMMERCIAL

## 2024-03-27 VITALS
BODY MASS INDEX: 24.69 KG/M2 | WEIGHT: 134.2 LBS | SYSTOLIC BLOOD PRESSURE: 102 MMHG | DIASTOLIC BLOOD PRESSURE: 78 MMHG | TEMPERATURE: 96.8 F | HEIGHT: 62 IN | HEART RATE: 70 BPM | RESPIRATION RATE: 16 BRPM | OXYGEN SATURATION: 95 %

## 2024-03-27 DIAGNOSIS — H71.92 CHOLESTEATOMA OF LEFT EAR: ICD-10-CM

## 2024-03-27 DIAGNOSIS — Z01.818 PRE-OP EVALUATION: ICD-10-CM

## 2024-03-27 DIAGNOSIS — G71.11 MYOTONIC DYSTROPHY, TYPE 1 (HCC): Primary | ICD-10-CM

## 2024-03-27 PROCEDURE — 99212 OFFICE O/P EST SF 10 MIN: CPT | Performed by: FAMILY MEDICINE

## 2024-03-27 RX ORDER — ERYTHROMYCIN 5 MG/G
OINTMENT OPHTHALMIC
COMMUNITY
Start: 2024-02-19

## 2024-03-27 RX ORDER — LIFITEGRAST 50 MG/ML
1 SOLUTION/ DROPS OPHTHALMIC 2 TIMES DAILY
COMMUNITY
Start: 2024-02-28

## 2024-03-27 RX ORDER — CYCLOSPORINE 0.5 MG/ML
1 EMULSION OPHTHALMIC 2 TIMES DAILY
COMMUNITY
Start: 2024-02-21

## 2024-03-27 NOTE — PROGRESS NOTES
Marky Abdul is a 48 y.o. male complains of   Chief Complaint   Patient presents with    Pre-op Exam     [facility] Dr. Kevin Naidu on 4/05/24 - [facility]       HPI: Here regarding consultation about having the surgery for left ear cholesteatoma.  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  Mental and physical disability.  Accompanied with caretaker and the brother.  Working with evaluation for risk of anesthesia with the pulmonary, neurology.  Advised to have a consultation from anesthesiologist due to his underlying medical conditions.  Currently he is a sitting comfortable on a wheelchair.  No cough cold wheezing.  Not using any extra respiratory muscles.      Allergies   Allergen Reactions    Shellfish Allergy Hives    Shellfish-Derived Products Other (See Comments)     Patient indicated Seafood/Shellfish allergy- \"Swelling of throat and Hives\"        ROS:  Review of Systems    Medication :  Current Outpatient Medications   Medication Sig Dispense Refill    erythromycin (ROMYCIN) 5 MG/GM ophthalmic ointment APPLE SMALL AMOUNT TO BOTH EYES TWICE A DAY      RESTASIS 0.05 % ophthalmic emulsion Place 1 drop into both eyes in the morning and at bedtime      XIIDRA 5 % SOLN Place 1 drop into both eyes in the morning and at bedtime      Propylene Glycol (SYSTANE COMPLETE OP) Apply to eye      polyethylene glycol (GLYCOLAX) 17 g packet Take by mouth      NONFORMULARY Shower Chair      SYSTANE 0.4-0.3 % ophthalmic solution ADMINISTER 2 DROPS TO BOTH EYES FOUR (4) TIMES DAILY. 30 mL 1    Wheat Dextrin (BENEFIBER) POWD TAKE 1 PACKET DAILY BY MOUTH       No current facility-administered medications for this visit.            OBJECTIVE:  /78 (Site: Left Upper Arm, Position: Sitting, Cuff Size: Small Adult)   Pulse 70   Temp 96.8 °F (36 °C) (Temporal)   Resp 16   Ht 1.575 m (5' 2\")   Wt 60.9 kg (134 lb 3.2 oz)   SpO2 95%   BMI 24.55 kg/m²       Physical Exam  Cardiovascular:      Rate and Rhythm:

## 2024-03-28 NOTE — TELEPHONE ENCOUNTER
Verbal order and read back per Jessica Vaca, DO  Patient can proceed at low risk from a cardiac standpoint. Clearance form faxed to [phone].

## 2024-04-17 ENCOUNTER — OFFICE VISIT (OUTPATIENT)
Age: 49
End: 2024-04-17
Payer: COMMERCIAL

## 2024-04-17 VITALS
SYSTOLIC BLOOD PRESSURE: 120 MMHG | HEART RATE: 79 BPM | BODY MASS INDEX: 24.66 KG/M2 | OXYGEN SATURATION: 99 % | HEIGHT: 62 IN | WEIGHT: 134 LBS | DIASTOLIC BLOOD PRESSURE: 68 MMHG

## 2024-04-17 DIAGNOSIS — G71.00 MUSCULAR DYSTROPHY, UNSPECIFIED (HCC): ICD-10-CM

## 2024-04-17 DIAGNOSIS — R94.31 ABNORMAL EKG: ICD-10-CM

## 2024-04-17 DIAGNOSIS — Z01.810 PREOP CARDIOVASCULAR EXAM: Primary | ICD-10-CM

## 2024-04-17 PROBLEM — K80.20 CALCULUS OF GALLBLADDER WITHOUT CHOLECYSTITIS WITHOUT OBSTRUCTION: Status: ACTIVE | Noted: 2022-09-07

## 2024-04-17 PROBLEM — R79.89 ELEVATED LFTS: Status: ACTIVE | Noted: 2022-04-22

## 2024-04-17 PROBLEM — K59.09 CHRONIC CONSTIPATION: Status: ACTIVE | Noted: 2022-09-07

## 2024-04-17 PROCEDURE — 99214 OFFICE O/P EST MOD 30 MIN: CPT | Performed by: INTERNAL MEDICINE

## 2024-04-17 RX ORDER — POLYETHYLENE GLYCOL 3350 17 G/17G
17 POWDER, FOR SOLUTION ORAL DAILY
COMMUNITY

## 2024-04-17 ASSESSMENT — PATIENT HEALTH QUESTIONNAIRE - PHQ9
SUM OF ALL RESPONSES TO PHQ QUESTIONS 1-9: 0
SUM OF ALL RESPONSES TO PHQ9 QUESTIONS 1 & 2: 0
2. FEELING DOWN, DEPRESSED OR HOPELESS: NOT AT ALL
1. LITTLE INTEREST OR PLEASURE IN DOING THINGS: NOT AT ALL

## 2024-04-17 ASSESSMENT — ANXIETY QUESTIONNAIRES
4. TROUBLE RELAXING: NOT AT ALL
6. BECOMING EASILY ANNOYED OR IRRITABLE: NOT AT ALL
5. BEING SO RESTLESS THAT IT IS HARD TO SIT STILL: NOT AT ALL
7. FEELING AFRAID AS IF SOMETHING AWFUL MIGHT HAPPEN: NOT AT ALL
2. NOT BEING ABLE TO STOP OR CONTROL WORRYING: NOT AT ALL
1. FEELING NERVOUS, ANXIOUS, OR ON EDGE: NOT AT ALL
GAD7 TOTAL SCORE: 0
3. WORRYING TOO MUCH ABOUT DIFFERENT THINGS: NOT AT ALL

## 2024-04-17 NOTE — PROGRESS NOTES
Marky Abdul    Chief Complaint   Patient presents with    New Patient     Patient referred by Dr FLORENCIA Delatorre for Muscular Dystrophy       HPI    Marky Abdul is a 46 y.o. with muscular dystrophy here for perioperative risk stratification prior to endoscopy and possible ear surgery. Pt's brother is his new caregiver since their father passed away, he gives majority of history. Says his brother cant walk safely totally independently, so he holds him up to walk.    He has no CP, no swelling, no palpitations, has never been observed passing out.  They have concern he has had no CV eval and can he undergo anesthesia.    Past Medical History:   Diagnosis Date    Cholesteatoma     Muscular dystrophy (HCC)        No past surgical history on file.    Current Outpatient Medications   Medication Sig Dispense Refill    Systane, propylene glycoL, 0.4-0.3 % drop Administer 2 Drops to both eyes four (4) times daily. 30 mL 1    Shower Chair XX garrett Need during shower (Patient not taking: Reported on 5/26/2022) 1 Each 0       Allergies   Allergen Reactions    Seafood Other (comments)     Patient indicated Seafood/Shellfish allergy- \"Swelling of throat and Hives\"        Social History     Socioeconomic History    Marital status: SINGLE     Spouse name: Not on file    Number of children: Not on file    Years of education: Not on file    Highest education level: Not on file   Occupational History    Not on file   Tobacco Use    Smoking status: Never Smoker    Smokeless tobacco: Never Used   Vaping Use    Vaping Use: Never used   Substance and Sexual Activity    Alcohol use: Never    Drug use: Never    Sexual activity: Never   Other Topics Concern    Not on file   Social History Narrative    Not on file     Social Determinants of Health     Financial Resource Strain:     Difficulty of Paying Living Expenses: Not on file   Food Insecurity:     Worried About Running Out of Food in the Last Year: Not on file    Ran Out of Food in the Last

## 2024-04-17 NOTE — PATIENT INSTRUCTIONS
Verbal order with read back Jessica Vaca, DO  Low risk for upcoming TYMPANOPLASTY WITH MASTOIDECTOMY AND OSSICULAR CHAIN RECONSTRUCTION

## 2024-04-17 NOTE — PROGRESS NOTES
Marky Abdul presents today for   Chief Complaint   Patient presents with    Follow-up     Over Due up/Lsn 2022       Marky Abdul preferred language for health care discussion is english/other.    Is someone accompanying this pt? no    Is the patient using any DME equipment during OV? no    Depression Screening:  Depression: Not at risk (4/17/2024)    PHQ-2     PHQ-2 Score: 0        Learning Assessment:  Who is the primary learner? Patient    What is the preferred language for health care of the primary learner? ENGLISH    How does the primary learner prefer to learn new concepts? DEMONSTRATION    Answered By patient    Relationship to Learner SELF           Pt currently taking Anticoagulant therapy? no    Pt currently taking Antiplatelet therapy ? no      Coordination of Care:  1. Have you been to the ER, urgent care clinic since your last visit? Hospitalized since your last visit? non    2. Have you seen or consulted any other health care providers outside of the [facility] since your last visit? Include any pap smears or colon screening. no

## 2024-07-16 ENCOUNTER — OFFICE VISIT (OUTPATIENT)
Facility: CLINIC | Age: 49
End: 2024-07-16
Payer: COMMERCIAL

## 2024-07-16 VITALS
TEMPERATURE: 97.1 F | SYSTOLIC BLOOD PRESSURE: 90 MMHG | DIASTOLIC BLOOD PRESSURE: 70 MMHG | HEIGHT: 62 IN | HEART RATE: 80 BPM | RESPIRATION RATE: 16 BRPM | BODY MASS INDEX: 24.51 KG/M2 | OXYGEN SATURATION: 97 %

## 2024-07-16 DIAGNOSIS — K52.9 ENTERITIS: Primary | ICD-10-CM

## 2024-07-16 DIAGNOSIS — G71.11 MYOTONIC DYSTROPHY, TYPE 1 (HCC): ICD-10-CM

## 2024-07-16 DIAGNOSIS — R13.10 DYSPHAGIA, UNSPECIFIED TYPE: ICD-10-CM

## 2024-07-16 DIAGNOSIS — R10.11 RIGHT UPPER QUADRANT ABDOMINAL PAIN: ICD-10-CM

## 2024-07-16 DIAGNOSIS — Z09 HOSPITAL DISCHARGE FOLLOW-UP: ICD-10-CM

## 2024-07-16 PROCEDURE — 1111F DSCHRG MED/CURRENT MED MERGE: CPT | Performed by: FAMILY MEDICINE

## 2024-07-16 PROCEDURE — 99214 OFFICE O/P EST MOD 30 MIN: CPT | Performed by: FAMILY MEDICINE

## 2024-07-16 RX ORDER — DICYCLOMINE HCL 20 MG
20 TABLET ORAL 4 TIMES DAILY PRN
Qty: 60 TABLET | Refills: 0 | Status: SHIPPED | OUTPATIENT
Start: 2024-07-16

## 2024-07-16 RX ORDER — DICYCLOMINE HCL 20 MG
20 TABLET ORAL 4 TIMES DAILY PRN
COMMUNITY
Start: 2024-07-10 | End: 2024-07-16 | Stop reason: SDUPTHER

## 2024-07-16 RX ORDER — ACETAMINOPHEN 500 MG
1000 TABLET ORAL 3 TIMES DAILY PRN
COMMUNITY
Start: 2024-07-10

## 2024-07-16 ASSESSMENT — ENCOUNTER SYMPTOMS
EYE REDNESS: 0
COUGH: 0
NAUSEA: 0
VOMITING: 0
BLOOD IN STOOL: 0
SHORTNESS OF BREATH: 0
CHEST TIGHTNESS: 0
ABDOMINAL DISTENTION: 0
ABDOMINAL PAIN: 1
DIARRHEA: 0
CONSTIPATION: 0
WHEEZING: 0

## 2024-07-16 NOTE — PROGRESS NOTES
\"Have you been to the ER, urgent care clinic since your last visit?  Hospitalized since your last visit?\"    [facility] ED LOV: 7/10/24 for enteritis.    “Have you seen or consulted any other health care providers outside of [facility] since your last visit?”    NO    Chief Complaint   Patient presents with    Follow-Up from Hospital     [facility] ED on 7/10/24 - Enteritis. Pain and tenderness in stomach after eating. No pain at this time.    Fall     Had two falls on 7/05/24. Skin left knee when he fell. Also,Paramedics came to the house because brother thought he was choking on 7/06/24    Blood Pressure Check     Had low readings on BP machine 97/75 and 83/64 - taken last night around 7:04 PM and 7:15 PM.       Patient has not had colonoscopy.    No colonoscopy on file  No cologuard on file  No FIT/FOBT on file   No flexible sigmoidoscopy on file         Click Here for Release of Records Request

## 2024-07-16 NOTE — PROGRESS NOTES
[facility]  2024    Marky Abdul (: 1975) is a 48 y.o. male, here for the following medical concerns:    Chief Complaint   Patient presents with    Follow-Up from Hospital      ED on 7/10/24 - Enteritis. Pain and tenderness in stomach after eating. No pain at this time.    Fall     Had two falls on 24. Skin left knee when he fell. Also,Paramedics came to the house because brother thought he was choking on 24    Blood Pressure Check     Had low readings on BP machine 97/75 and 83/64 - taken last night around 7:04 PM and 7:15 PM.        ASSESSMENT/ PLAN  1. Enteritis  Uncontrolled but stable. Will work on eating a more bland and liquid based diet. Continue with bentyl PRN. Once he is tolerating soft, bland foods, may progress as tolerated. Follow up in 2 weeks to gauge improvement, sooner if needed. Discussed the red flags such as fever, increasing abd pain and blood in stool as well as inability to tolerate PO.   - dicyclomine (BENTYL) 20 MG tablet; Take 1 tablet by mouth 4 times daily as needed (abdominal cramping)  Dispense: 60 tablet; Refill: 0    2. Right upper quadrant abdominal pain  As above.   - dicyclomine (BENTYL) 20 MG tablet; Take 1 tablet by mouth 4 times daily as needed (abdominal cramping)  Dispense: 60 tablet; Refill: 0    3. Dysphagia, unspecified type  New referral to SLP for evaluation and treatment options.   - SLP eval and treat; Future    4. Myotonic dystrophy, type 1 (HCC)  Stable.     I have spent 30 minutes on chart review, care coordination and patient counseling regarding disease state, lifestyle modifications and/or health maintenance screening.      Return in about 2 weeks (around 2024).    Future Appointments   Date Time Provider Department Center   2024 11:30 AM Ami Delatorre MD HR  [facility]         HPI  ED visit on 7/10/24. PMH muscular dystrophy, presented with left sided abd pain. CT Abd/pel suggestive of enteritis.

## 2024-07-31 ENCOUNTER — OFFICE VISIT (OUTPATIENT)
Facility: CLINIC | Age: 49
End: 2024-07-31
Payer: COMMERCIAL

## 2024-07-31 VITALS
TEMPERATURE: 96.8 F | SYSTOLIC BLOOD PRESSURE: 110 MMHG | OXYGEN SATURATION: 95 % | DIASTOLIC BLOOD PRESSURE: 76 MMHG | RESPIRATION RATE: 16 BRPM | HEART RATE: 67 BPM | BODY MASS INDEX: 24.51 KG/M2 | HEIGHT: 62 IN

## 2024-07-31 DIAGNOSIS — R13.10 DYSPHAGIA, UNSPECIFIED TYPE: ICD-10-CM

## 2024-07-31 DIAGNOSIS — Z13.1 SCREENING FOR DIABETES MELLITUS: ICD-10-CM

## 2024-07-31 DIAGNOSIS — R10.9 CHRONIC ABDOMINAL PAIN: ICD-10-CM

## 2024-07-31 DIAGNOSIS — K52.9 ENTERITIS: Primary | ICD-10-CM

## 2024-07-31 DIAGNOSIS — R79.89 ELEVATED LFTS: ICD-10-CM

## 2024-07-31 DIAGNOSIS — R71.8 ELEVATED HEMATOCRIT: ICD-10-CM

## 2024-07-31 DIAGNOSIS — G89.29 CHRONIC ABDOMINAL PAIN: ICD-10-CM

## 2024-07-31 DIAGNOSIS — G71.11 MYOTONIC DYSTROPHY, TYPE 1 (HCC): ICD-10-CM

## 2024-07-31 DIAGNOSIS — D58.2 ELEVATED HEMOGLOBIN (HCC): ICD-10-CM

## 2024-07-31 PROCEDURE — 99214 OFFICE O/P EST MOD 30 MIN: CPT | Performed by: FAMILY MEDICINE

## 2024-07-31 NOTE — PROGRESS NOTES
\"Have you been to the ER, urgent care clinic since your last visit?  Hospitalized since your last visit?\"    NO    “Have you seen or consulted any other health care providers outside of [facility] since your last visit?”    NO        “Have you had a colorectal cancer screening such as a colonoscopy/FIT/Cologuard?    NO    No colonoscopy on file  No cologuard on file  No FIT/FOBT on file   No flexible sigmoidoscopy on file       Chief Complaint   Patient presents with    Enteritis    Abdominal Pain     RUQ abdominal pain follow-up    Dysphagia    Myotonic dystrophy       Click Here for Release of Records Request  
glucose levels were normal during his ER visit.    Three weeks ago, he required an ambulance due to vomiting, which was difficult for him due to his lack of strength. The accompanying adult has been assisting him by manually removing vomit from his throat, but this method is ineffective for liquid substances. They are interested in learning about potential suction devices that could assist with this issue.    The accompanying adult also mentioned that Dr. Cuca Gaviria had referred him to speech therapy, but they have not yet been contacted.    FAMILY HISTORY  His father was diabetic and everybody on his side of the family is diabetic.    Review of Systems       Objective   Blood pressure 110/76, pulse 67, temperature 96.8 °F (36 °C), temperature source Temporal, resp. rate 16, height 1.575 m (5' 2\"), SpO2 95 %.  Physical Exam  Patient is sitting without any acute distress.  Abdomen is nontender and soft.  Patient is awake and in his usual appearance.           The patient (or guardian, if applicable) and other individuals in attendance with the patient were advised that Artificial Intelligence will be utilized during this visit to record, process the conversation to generate a clinical note and to support improvement of the AI technology. The patient (or guardian, if applicable) and other individuals in attendance at the appointment consented to the use of AI, including the recording.      An electronic signature was used to authenticate this note.    --Ami Delatorre MD

## 2024-08-14 ENCOUNTER — HOSPITAL ENCOUNTER (OUTPATIENT)
Facility: HOSPITAL | Age: 49
Setting detail: RECURRING SERIES
Discharge: HOME | End: 2024-08-17
Payer: COMMERCIAL

## 2024-08-14 PROCEDURE — 92610 EVALUATE SWALLOWING FUNCTION: CPT

## 2024-08-14 NOTE — PROGRESS NOTES
ST DAILY TREATMENT NOTE    Patient Name: Marky Abdul  Date:2024  : 1975  [x]  Patient  Verified  [facility] [employer] [facility] [employer] [facility] [place] / [facility] [place] / Product Type: *No Product type* /   In time:1:24  Out time:2:04  Total Treatment Time (min): 40  Visit #: 1 of 4-8  PN due 24    SUBJECTIVE  Pain Level (0-10 scale): 0    Subjective functional status/changes:   [] No changes reported  This 47yo male pt was referred for OP ST evaluation by PCP d/t difficulties swallowing which results into emesis. Pt with  hx of myotonic dystropy. He was previously seen by OP ST in  to address difficulties with swallowing and fluency. MBSS was completed also in  which indicated mild-mod pharyngoesophageal dysphagia and was rec regular solids/thin liquids diet, referral to GI for further assessment of esophageal swallow function, and continuing OP ST to address oropharyngeal strength.     Pt presents today with c/o coughing and choking when eating food, especially when he is eating quickly. He is accompanied by Joseph Chantell (legal guardian/brother) and Elyssa Peterson (home attendant) during this evaluation. Pt's brother reported that they were trying to obtain suctioning, which this clinician interpreted is for the level of the pharynx, d/t emesis. Pt's brother also endorsed that emesis had resulted in pt being rushed to the ED. Per ED notes, pt with abdominal pain, recent possible choking event, and questionable aspiration. Pt with reported increased bolus size during meals, inconsistent to no use of alternating solids and liquids, and talking while mouth is full. Pt with hx of stuttering and which was previously addressed last time pt was seen by OP ST in this clinic. Pt's brother also reported that they have attempted to f/u for GI referral, however, GI clinic they were attempting to see did not accept pt's insurance and will need to be seen by another GI. Previous GI rec was 
pharynx []) X             Coughing post swallow               Throat clear post swallow               Wet vocal quality  (residue on vocal cords [])               Reduced laryngeal elevation               Nasal Regurgitation  (? velopharyngeal closure)               Other   Burp           Pt, with assistance from pt's brother, completed the Eating Assessment Tool (EAT-10), a self-administered, symptom-specific outcome instrument for dysphagia. The normative data suggest that an EAT-10 score of 3 or higher is abnormal. Based on pt's responses, pt achieved a self-rated score of 29 indicating perceived abnormal swallowing        [] No symptoms of dysphagia evidenced  [x] Symptoms of dysphagia observed  [x] Patient at risk for aspiration  [] Other:     Impression:  Pt presents with mod oral phase dysphagia and suspected mild-mod pharyngosophageal dysphagia. Pt with prolonged mastication with decreased rotary chew and lingual mashing, mild decreased bolus formation, and prolonged A-P transit during PO of regular solids and soft and bite-sized. Pt also with oral residue s/p swallow of regular solids, cleared with liquid wash x2. Pt with multiple consecutive swallows during PO of thin liquids. Burping s/p PO of NTL. No other overt s/sx of aspiration, no observed dyspnea, changes in vocal quality, or other s/sx of respiratory distress. Rec regular solids/thin liquids with extra gravy and sauces for moisture, soft meats, and strict aspiration/GERD precautions (e.g., small bites and sips, alternating solids and liquids, empty mouth before next bite, swallow hard, sitting upright during mealtimes and 30-min after meals, eating an hour prior to lying down, etc), oral care TID, and meds as tolerated (currently in pureed per pt's brother). Pt's last MBSS was 2023, though suspect there may be no significant changes in pt's swallowing, rec MBSS d/t pt's degenerative dystrophy. F/u with GI also recommended.   Pt would benefit from

## 2024-08-23 DIAGNOSIS — R13.19 OTHER DYSPHAGIA: Primary | ICD-10-CM

## 2024-09-04 ENCOUNTER — HOSPITAL ENCOUNTER (OUTPATIENT)
Facility: HOSPITAL | Age: 49
Setting detail: RECURRING SERIES
Discharge: HOME | End: 2024-09-07
Payer: COMMERCIAL

## 2024-09-04 PROCEDURE — 92526 ORAL FUNCTION THERAPY: CPT

## 2024-09-04 NOTE — PROGRESS NOTES
ST DAILY TREATMENT NOTE    Patient Name: Marky Abdul  Date:2024  : 1975  [x]  Patient  Verified  Payor: Payor: [employer] [facility] [place] / Plan: [facility] [employer] [facility] [place] / Product Type: *No Product type* /   In time::23  Out time:12:04  Total Treatment Time (min): 41  Visit #: 2 of 4-8  PN due 24     Treatment Diagnosis: Dysphagia, oropharyngeal phase [R13.12]    SUBJECTIVE  Pain Level (0-10 scale): 0    Subjective functional status/changes:   [] No changes reported  Pt was accompanied by brother during this session. Pt's brother reported pt continues to demo use of increased bolus rate and size, causing pt to have overt s/sx of aspiration/penetration. Pt's brother also reported snoring and coughing at night, SLP rec referring back to pt's MD for possible referral to sleep specialist if indicated.     OBJECTIVE  Treatment provided includes:  Increase/Improve:  []  Voice Quality []  Cognitive Linguistic Skills [x]  Laryngeal/Pharyngeal Exercises   []  Vocal Loudness []  Reading Comprehension [x]  Swallowing Skills    []  Vocal Cord Function []  Auditory Comprehension [x]  Oral Motor Skills   []  Resonance []  Writing Skills [x]  Compensatory strategies    []  Speech Intelligibility []  Expressive Language []  Attention   []  Breath Support/Coord. []  Receptive language []  Memory   []  Articulation []  Safety Awareness [x] Pt educ   []  Fluency []  Word Retrieval []        Treatment Provided:  Dysphagia Treatment [46265]  -aspiration precautions  -pt/caregiver educ    Patient/Caregiver  Education: [x] Review HEP      HEP/Handouts given: Aspiration precautions    Pain Level (0-10 scale) post treatment: 0    ASSESSMENT     []   Improving appropriately and progressing toward goals  [x]   Improving slowly and progressing toward goals  []   Approximating goals/maximum potential  [x]   Continues to benefit from skilled therapy to address remaining functional deficits  []   Not progressing

## 2024-09-12 ENCOUNTER — HOSPITAL ENCOUNTER (OUTPATIENT)
Facility: HOSPITAL | Age: 49
Discharge: HOME | End: 2024-09-15
Payer: COMMERCIAL

## 2024-09-12 DIAGNOSIS — D58.2 ELEVATED HEMOGLOBIN (HCC): ICD-10-CM

## 2024-09-12 DIAGNOSIS — R79.89 ELEVATED LFTS: ICD-10-CM

## 2024-09-12 DIAGNOSIS — R71.8 ELEVATED HEMATOCRIT: ICD-10-CM

## 2024-09-12 DIAGNOSIS — Z13.1 SCREENING FOR DIABETES MELLITUS: ICD-10-CM

## 2024-09-12 LAB
ALBUMIN SERPL-MCNC: 3.4 G/DL (ref 3.4–5)
ALBUMIN/GLOB SERPL: 1.3 (ref 0.8–1.7)
ALP SERPL-CCNC: 108 U/L (ref 45–117)
ALT SERPL-CCNC: 65 U/L (ref 16–61)
AST SERPL-CCNC: 31 U/L (ref 10–38)
BILIRUB DIRECT SERPL-MCNC: 0.3 MG/DL (ref 0–0.2)
BILIRUB SERPL-MCNC: 1.1 MG/DL (ref 0.2–1)
ERYTHROCYTE [DISTWIDTH] IN BLOOD BY AUTOMATED COUNT: 14.7 % (ref 11.6–14.5)
EST. AVERAGE GLUCOSE BLD GHB EST-MCNC: 111 MG/DL
GLOBULIN SER CALC-MCNC: 2.7 G/DL (ref 2–4)
HBA1C MFR BLD: 5.5 % (ref 4.2–5.6)
HCT VFR BLD AUTO: 49.2 % (ref 36–48)
HGB BLD-MCNC: 15.1 G/DL (ref 13–16)
MCH RBC QN AUTO: 28.9 PG (ref 24–34)
MCHC RBC AUTO-ENTMCNC: 30.7 G/DL (ref 31–37)
MCV RBC AUTO: 94.1 FL (ref 78–100)
NRBC # BLD: 0 K/UL (ref 0–0.01)
NRBC BLD-RTO: 0 PER 100 WBC
PLATELET # BLD AUTO: 200 K/UL (ref 135–420)
PMV BLD AUTO: 11.5 FL (ref 9.2–11.8)
PROT SERPL-MCNC: 6.1 G/DL (ref 6.4–8.2)
RBC # BLD AUTO: 5.23 M/UL (ref 4.35–5.65)
WBC # BLD AUTO: 5.6 K/UL (ref 4.6–13.2)

## 2024-09-12 PROCEDURE — 83036 HEMOGLOBIN GLYCOSYLATED A1C: CPT

## 2024-09-12 PROCEDURE — 36415 COLL VENOUS BLD VENIPUNCTURE: CPT

## 2024-09-12 PROCEDURE — 80076 HEPATIC FUNCTION PANEL: CPT

## 2024-09-12 PROCEDURE — 85027 COMPLETE CBC AUTOMATED: CPT

## 2024-09-16 ENCOUNTER — HOSPITAL ENCOUNTER (OUTPATIENT)
Facility: HOSPITAL | Age: 49
Discharge: HOME | End: 2024-09-19
Payer: COMMERCIAL

## 2024-09-16 ENCOUNTER — OFFICE VISIT (OUTPATIENT)
Facility: CLINIC | Age: 49
End: 2024-09-16
Payer: COMMERCIAL

## 2024-09-16 ENCOUNTER — APPOINTMENT (OUTPATIENT)
Facility: HOSPITAL | Age: 49
End: 2024-09-16
Payer: COMMERCIAL

## 2024-09-16 ENCOUNTER — TELEPHONE (OUTPATIENT)
Facility: HOSPITAL | Age: 49
End: 2024-09-16

## 2024-09-16 VITALS
HEART RATE: 69 BPM | DIASTOLIC BLOOD PRESSURE: 72 MMHG | HEIGHT: 62 IN | BODY MASS INDEX: 23.45 KG/M2 | TEMPERATURE: 97.4 F | SYSTOLIC BLOOD PRESSURE: 96 MMHG | OXYGEN SATURATION: 97 % | WEIGHT: 127.4 LBS | RESPIRATION RATE: 16 BRPM

## 2024-09-16 DIAGNOSIS — R10.84 GENERALIZED ABDOMINAL PAIN: ICD-10-CM

## 2024-09-16 DIAGNOSIS — R79.89 ELEVATED LFTS: ICD-10-CM

## 2024-09-16 DIAGNOSIS — R13.10 DYSPHAGIA, UNSPECIFIED TYPE: ICD-10-CM

## 2024-09-16 DIAGNOSIS — R19.8 CHANGE IN BOWEL MOVEMENT: ICD-10-CM

## 2024-09-16 DIAGNOSIS — G71.00 MUSCULAR DYSTROPHY (HCC): ICD-10-CM

## 2024-09-16 DIAGNOSIS — H26.9 CATARACT OF BOTH EYES, UNSPECIFIED CATARACT TYPE: ICD-10-CM

## 2024-09-16 DIAGNOSIS — Z12.5 SCREENING FOR PROSTATE CANCER: ICD-10-CM

## 2024-09-16 DIAGNOSIS — Z00.00 ENCOUNTER FOR WELL ADULT EXAM WITHOUT ABNORMAL FINDINGS: Primary | ICD-10-CM

## 2024-09-16 DIAGNOSIS — Z12.11 SCREENING FOR COLON CANCER: ICD-10-CM

## 2024-09-16 DIAGNOSIS — R71.8 ELEVATED HEMATOCRIT: ICD-10-CM

## 2024-09-16 PROCEDURE — 99213 OFFICE O/P EST LOW 20 MIN: CPT | Performed by: FAMILY MEDICINE

## 2024-09-16 PROCEDURE — 74019 RADEX ABDOMEN 2 VIEWS: CPT

## 2024-09-16 PROCEDURE — 99396 PREV VISIT EST AGE 40-64: CPT | Performed by: FAMILY MEDICINE

## 2024-09-16 SDOH — ECONOMIC STABILITY: FOOD INSECURITY: WITHIN THE PAST 12 MONTHS, YOU WORRIED THAT YOUR FOOD WOULD RUN OUT BEFORE YOU GOT MONEY TO BUY MORE.: NEVER TRUE

## 2024-09-16 SDOH — ECONOMIC STABILITY: FOOD INSECURITY: WITHIN THE PAST 12 MONTHS, THE FOOD YOU BOUGHT JUST DIDN'T LAST AND YOU DIDN'T HAVE MONEY TO GET MORE.: NEVER TRUE

## 2024-09-16 SDOH — ECONOMIC STABILITY: INCOME INSECURITY: HOW HARD IS IT FOR YOU TO PAY FOR THE VERY BASICS LIKE FOOD, HOUSING, MEDICAL CARE, AND HEATING?: NOT HARD AT ALL

## 2024-09-16 ASSESSMENT — PATIENT HEALTH QUESTIONNAIRE - PHQ9
SUM OF ALL RESPONSES TO PHQ QUESTIONS 1-9: 0
SUM OF ALL RESPONSES TO PHQ9 QUESTIONS 1 & 2: 0
1. LITTLE INTEREST OR PLEASURE IN DOING THINGS: NOT AT ALL
SUM OF ALL RESPONSES TO PHQ QUESTIONS 1-9: 0
SUM OF ALL RESPONSES TO PHQ QUESTIONS 1-9: 0
2. FEELING DOWN, DEPRESSED OR HOPELESS: NOT AT ALL
SUM OF ALL RESPONSES TO PHQ QUESTIONS 1-9: 0

## 2024-09-18 ENCOUNTER — APPOINTMENT (OUTPATIENT)
Facility: HOSPITAL | Age: 49
End: 2024-09-18
Payer: COMMERCIAL

## 2024-09-25 ENCOUNTER — APPOINTMENT (OUTPATIENT)
Facility: HOSPITAL | Age: 49
End: 2024-09-25
Payer: COMMERCIAL

## 2024-10-04 ENCOUNTER — HOSPITAL ENCOUNTER (OUTPATIENT)
Facility: HOSPITAL | Age: 49
Setting detail: RECURRING SERIES
Discharge: HOME | End: 2024-10-07
Payer: COMMERCIAL

## 2024-10-04 PROCEDURE — 92526 ORAL FUNCTION THERAPY: CPT

## 2024-10-04 NOTE — PROGRESS NOTES
JUSTIN GUTIERREZ [facility]  [address], [place] [address] Ph:[phone] Fx: [phone]    Speech Therapy Physician Update  Goals will be assigned and reassessed every 10 visits/ 30 days per Medicare guidelines  Current Reporting Period 24 to 24    [x] Progress Note  [] Discharge Summary    Patient Name: Marky Abdul : 1975   Treatment/Medical   Diagnosis: Dysphagia, oropharyngeal phase [R13.12]   Onset Date: Chronic; referred 24      Referral Source: Ami Delatorre MD Start of Care (SOC): 24   Prior Hospitalization: See medical history Provider #: [id number]   Prior Level of Function: Chronic swallow deficits, stuttering at baseline, impaired cognition    Comorbidities: Hx of myotonic dystroophy, cholesteatoma Cognitive impairment, Musculoskeletal disorders, Neurologic condition, and Complications related to surgery      Medications: Verified on Patient Summary List     Visits from Start of Care: 3    Missed Visits: 0    The Plan of Care and following information is based on the patient's current status:  Short-term Goals:  Goal: 1) Pt/caregivers will comprehend, teach back, and utilize aspiration precautions to decrease risk of aspiration/penetration during PO intake of least restrictive diet in 80% of observed opportunities given modA.   Status at last note/certification: Per bedside evaluation, Pt presents with mod oral phase dysphagia and suspected mild-mod pharyngosophageal dysphagia.   Current Status: not met; pt and caregiver were educ extensively on utilizing aspiration precautions. Pt's caregiver reported giving pt \"hefty\" meals which he endorsed would cause pain on his stomach. Rec using half portions of meals to decrease pt's PO rate and size, then serving the other half if pt indicates he would like some more. Pt and pt's caregiver provided aspiration mat. Pt and pt's caregiver receptive to information.     Pt's brother

## 2024-10-04 NOTE — PROGRESS NOTES
ST DAILY TREATMENT NOTE    Patient Name: Marky Abdul  Date:10/4/2024  : 1975  [x]  Patient  Verified  Payor: Pay[facility] [employer] [place] [facility] [place] / Product Type: *No Product type* /   In time::22  Out time:12:00  Total Treatment Time (min): 38  Visit #: 1 of 4-8  PN due 24       Treatment Diagnosis: Dysphagia, oropharyngeal phase [R13.12]    SUBJECTIVE  Pain Level (0-10 scale): 0    Subjective functional status/changes:   [] No changes reported  Pt reported that he has been taking small bites. Pt's brother reported that pt is resistant to utilizing aspiration precautions, however, is attempting to. Pt reported he was seen by GI and they recommended for esophagram. Pt's brother endorsed pain from gas. He reported PO consistency is soft and bite-sized/mechanically ground. Pt has been scheduled for MBSS on Tuesday at 12:30 pm at [facility].     OBJECTIVE  Treatment provided includes:  Increase/Improve:  []  Voice Quality []  Cognitive Linguistic Skills [x]  Laryngeal/Pharyngeal Exercises   []  Vocal Loudness []  Reading Comprehension [x]  Swallowing Skills    []  Vocal Cord Function []  Auditory Comprehension [x]  Oral Motor Skills   []  Resonance []  Writing Skills [x]  Compensatory strategies    []  Speech Intelligibility []  Expressive Language []  Attention   []  Breath Support/Coord. []  Receptive language []  Memory   []  Articulation []  Safety Awareness [x] Pt/caregiver educ   []  Fluency []  Word Retrieval []        Treatment Provided:  Dysphagia Treatment [66776]   -aspiration precautions  -EAT-10 re-administration  -effortful swallow  -pt/caregiver educ    Patient/Caregiver  Education: [x] Review HEP      HEP/Handouts given: Effortful swallow    Pain Level (0-10 scale) post treatment: 0    ASSESSMENT   Pt completed the Eating Assessment Tool (EAT-10), a self-administered, symptom-specific outcome instrument for dysphagia. The normative data suggest that an

## 2024-10-07 ENCOUNTER — HOSPITAL ENCOUNTER (OUTPATIENT)
Facility: HOSPITAL | Age: 49
Setting detail: RECURRING SERIES
End: 2024-10-07
Payer: COMMERCIAL

## 2024-10-08 ENCOUNTER — HOSPITAL ENCOUNTER (OUTPATIENT)
Facility: HOSPITAL | Age: 49
Discharge: HOME | End: 2024-10-11
Payer: COMMERCIAL

## 2024-10-08 DIAGNOSIS — R13.12 OROPHARYNGEAL DYSPHAGIA: ICD-10-CM

## 2024-10-08 PROCEDURE — 92611 MOTION FLUOROSCOPY/SWALLOW: CPT

## 2024-10-08 PROCEDURE — 74230 X-RAY XM SWLNG FUNCJ C+: CPT

## 2024-10-08 PROCEDURE — 2500000003 HC RX 250 WO HCPCS: Performed by: FAMILY MEDICINE

## 2024-10-08 RX ADMIN — BARIUM SULFATE 1 TABLET: 700 TABLET ORAL at 13:28

## 2024-10-08 RX ADMIN — BARIUM SULFATE 30 ML: 960 POWDER, FOR SUSPENSION ORAL at 13:28

## 2024-10-08 RX ADMIN — BARIUM SULFATE 45 ML: 400 PASTE ORAL at 13:28

## 2024-10-08 NOTE — PROGRESS NOTES
JUSTIN [facility]  SPEECH LANGUAGE PATHOLOGY OUTPATIENT MODIFIED BARIUM SWALLOW STUDY    Patient: Marky Abdul (48 y.o. male)  Date: 10/8/2024  Primary Diagnosis: Oropharyngeal dysphagia [R13.12]   Day of Surgery   Precautions: Aspiration    Assessment:  Based on the objective data described below, the patient presents with mild pharyngeal dysphagia and esophageal like symptoms that need further assessing. Pt tolerated reg solids, puree, thin liquids +/- straw, and 13mm Ba pill with thin liquid wash without penetration/aspiration events. Pt required multiple thin liquid washes and head turn to the left to clear 13mm Ba pill from pharynx.  Pt's family reports they normally crush his pills with applesauce. Premature spillage to pyriforms and mod vallecular/pyriforms residuals not cleared with multiple dry swallows. No evidence of airway compromise from residuals, however there appeared to be excessive air in the upper esophasgus that seemed to be preventing clearance of pharyngeal residuals. Pt would benefit from further testing to assess esophageal symptoms with GI.  Functional laryngeal elevation with all trials. Recommend regular solids and thin liquids, small bites/sips, slow intake, remain upright for ~45 mins after meals, aspiration precautions, oral care TID, and meds crushed in puree.      Recommendations:   Regular diet with thin liquids  Aspiration precautions  HOB >45 during po intake, remain >30 for 30-45 minutes after po   Small bites/sips; alternate liquid/solid with slow feeding rate   Oral care TID  Meds crushed in puree     SUBJECTIVE:   Patient stated “is it good?”.    OBJECTIVE:     Past Medical History:   Diagnosis Date    Cholesteatoma     Muscular dystrophy (HCC)    No past surgical history on file.  Prior Level of Swallowing Function/Home Situation:   Pt's family provided hx for Pt this date. Pt with prior MBS that showed excess residuals in valleculae and pyriforms sinuses,

## 2024-10-09 ENCOUNTER — HOSPITAL ENCOUNTER (OUTPATIENT)
Facility: HOSPITAL | Age: 49
Setting detail: RECURRING SERIES
Discharge: HOME | End: 2024-10-12
Payer: COMMERCIAL

## 2024-10-09 PROCEDURE — 92526 ORAL FUNCTION THERAPY: CPT

## 2024-10-09 NOTE — PROGRESS NOTES
meals, aspiration precautions, oral care TID, and meds crushed in puree.      3) Pt will complete oral-motor exercises to increase strength, ROM, and efficiency of oral-motor structures to increase safety and efficiency during PO intake of least restrictive diet with x10-15 reps given modA.   Current: Pt given mod-max verbal and visual cues to increase mandibular ROM to increase efficiency during PO. MBSS did not detect any deficits with oral phase. Mod oral residue at bedside cleared with liquid wash x2. Piecemeal swallow at bedside.     4) Pt will complete pharyngeal swallow exercises to increase oropharyngeal strength and function to increase safety and efficiency during po intake of least restrictive diet x10-15 reps given modA.  Current: Effortful swallow during PO of regular solids/thin liquids: x20/20 reps given min-mod verbal and visual cues.      5)  Pt and caregiver will participate in educ sessions to optimize safe consumption of po trials to reduce aspiration risk and improve QOL.  Current: Not targeted this date.     PLAN  [x]  Continue plan of care  []  Modify Goals/Treatment Plan      []  Discharge due to:  [] Other:    Meri Aleman M.A., CCC-SLP  Speech Language Pathologist   10/9/2024  11:20 AM    Future Appointments   Date Time Provider Department Center   10/9/2024 11:20 AM Meri Aleman SLP [id number]   10/14/2024 11:20 AM Meri Aleman, SLP [email or web address]   10/16/2024 11:00 AM Ami Delatorre MD [facility]   10/16/2024 11:20 AM Meri Aleman SLP [email or web address]   10/21/2024 11:20 AM Meri Aleman, SLP [email or web address]   10/23/2024 11:20 AM Meri Aleman, SLP [email or web address]   10/28/2024 11:20 AM Meri Aleman, SLP [email or web address]   10/30/2024 11:30 AM Ami Delatorre MD [facility]   11/4/2024 11:20 AM Meri Aleman SLP [email or web address]   11/6/2024 11:20 AM Meri Aleman, SLP [email or web address]   11/11/2024 11:20 AM Meri Aleman, SLP

## 2024-10-14 ENCOUNTER — HOSPITAL ENCOUNTER (OUTPATIENT)
Facility: HOSPITAL | Age: 49
Setting detail: RECURRING SERIES
End: 2024-10-14
Payer: COMMERCIAL

## 2024-10-16 ENCOUNTER — APPOINTMENT (OUTPATIENT)
Facility: HOSPITAL | Age: 49
End: 2024-10-16
Payer: COMMERCIAL

## 2024-10-16 ENCOUNTER — OFFICE VISIT (OUTPATIENT)
Facility: CLINIC | Age: 49
End: 2024-10-16
Payer: COMMERCIAL

## 2024-10-16 VITALS
OXYGEN SATURATION: 99 % | RESPIRATION RATE: 16 BRPM | BODY MASS INDEX: 23.3 KG/M2 | DIASTOLIC BLOOD PRESSURE: 70 MMHG | HEIGHT: 62 IN | SYSTOLIC BLOOD PRESSURE: 104 MMHG | TEMPERATURE: 97.4 F | HEART RATE: 61 BPM

## 2024-10-16 DIAGNOSIS — R13.10 DYSPHAGIA, UNSPECIFIED TYPE: ICD-10-CM

## 2024-10-16 DIAGNOSIS — R26.89 BALANCE PROBLEM: Primary | ICD-10-CM

## 2024-10-16 DIAGNOSIS — R19.8 CHANGE IN BOWEL MOVEMENT: ICD-10-CM

## 2024-10-16 DIAGNOSIS — G71.11 MYOTONIC DYSTROPHY, TYPE 1 (HCC): ICD-10-CM

## 2024-10-16 PROCEDURE — 99213 OFFICE O/P EST LOW 20 MIN: CPT | Performed by: FAMILY MEDICINE

## 2024-10-16 NOTE — PROGRESS NOTES
\"Have you been to the ER, urgent care clinic since your last visit?  Hospitalized since your last visit?\"    NO    “Have you seen or consulted any other health care providers outside our system since your last visit?”    [facility], Dr. Lopez LOV: 9/25/24. Patient had barium swallow at [facility] on 10/08/24.      “Have you had a colorectal cancer screening such as a colonoscopy/FIT/Cologuard?    NO    No colonoscopy on file  No cologuard on file  No FIT/FOBT on file   No flexible sigmoidoscopy on file     Chief Complaint   Patient presents with    Change in bowel movements    Dysphagia          
months (around 4/16/2025).       Subjective   History of Present Illness  The patient presents for evaluation of multiple medical concerns. He is accompanied by an adult female.    He recently experienced a fall from a chair-height position, landing on his buttocks. Despite the fall, he reports no pain in the buttock area or difficulty walking. There are no visible signs of injury such as bruising or swelling. He is able to stand up from his wheelchair and walk without support, although he does exhibit some balance issues. Physical therapy has attempted to introduce a walker, but he struggles with understanding its mechanics. A cane was also tried, but it was deemed more of a hazard than a help by his neurologist.    He has been experiencing digestive issues, which have been managed with the introduction of a probiotic, Culturelle, and an increase in water intake to four bottles a day. This regimen has resulted in a decrease in stomach pain since his last visit. However, he still experiences some discomfort and constipation, leading to less frequent bathroom visits. His diet was adjusted to a low FODMAP diet about 2.5 to 3 weeks ago, which excludes garlic and onions and includes mainly turkey, chicken, and sweet potatoes.    On Tuesday, 10/12/2024, he underwent a modified barium swallow study. The results indicated that residual food remains stuck, causing a choking sensation. It was suggested that this could be due to an esophageal stricture, which may require dilation.    Review of Systems       Objective   Blood pressure 104/70, pulse 61, temperature 97.4 °F (36.3 °C), temperature source Temporal, resp. rate 16, height 1.575 m (5' 2\"), SpO2 99%.  Physical Exam  Patient is sitting comfortably without any acute distress.  Bowel sounds are present in the gastrointestinal system, which is nontender.  Behavior is normal.           The patient (or guardian, if applicable) and other individuals in attendance with the

## 2024-10-21 ENCOUNTER — HOSPITAL ENCOUNTER (OUTPATIENT)
Facility: HOSPITAL | Age: 49
Setting detail: RECURRING SERIES
Discharge: HOME | End: 2024-10-24
Payer: COMMERCIAL

## 2024-10-21 PROCEDURE — 92526 ORAL FUNCTION THERAPY: CPT

## 2024-10-21 NOTE — PROGRESS NOTES
ST DAILY TREATMENT NOTE    Patient Name: Marky Abdul  Date:10/21/2024  : 1975  [x]  Patient  Verified  Payor: Pay[facility] [employer] [place] [facility] [place] / Product Type: *No Product type* /   In time:11:20  Out time:11:55  Total Treatment Time (min): 35  Visit #: 3 of 4-8  PN due 24     Treatment Diagnosis: Dysphagia, oropharyngeal phase [R13.12]    SUBJECTIVE  Pain Level (0-10 scale): 0    Subjective functional status/changes:   [] No changes reported  Per PCP notes, neuro will have to see him first prior to any procedures. He was accompanied by caregiver/aide (Elyssa Peterson). Pt's caregiver reported that pt only eats x1 throughout the day, though, there were no reported overt s/sx of aspiration/penetration.     OBJECTIVE  Treatment provided includes:  Increase/Improve:  []  Voice Quality []  Cognitive Linguistic Skills [x]  Laryngeal/Pharyngeal Exercises   []  Vocal Loudness []  Reading Comprehension [x]  Swallowing Skills    []  Vocal Cord Function []  Auditory Comprehension []  Oral Motor Skills   []  Resonance []  Writing Skills [x]  Compensatory strategies    []  Speech Intelligibility []  Expressive Language []  Attention   []  Breath Support/Coord. []  Receptive language []  Memory   []  Articulation []  Safety Awareness [x] Pt/caregiver educ   []  Fluency []  Word Retrieval []        Treatment Provided:  Dysphagia Treatment [97002]   -aspiration precautions  -pt/caregiver educ     Patient/Caregiver  Education: [x] Review HEP      HEP/Handouts given: Bring food from home    Pain Level (0-10 scale) post treatment: 0     ASSESSMENT     [x]   Improving appropriately and progressing toward goals  []   Improving slowly and progressing toward goals  []   Approximating goals/maximum potential  [x]   Continues to benefit from skilled therapy to address remaining functional deficits  []   Not progressing toward goals and plan of care will be adjusted    Patient will

## 2024-10-23 ENCOUNTER — HOSPITAL ENCOUNTER (OUTPATIENT)
Facility: HOSPITAL | Age: 49
Setting detail: RECURRING SERIES
Discharge: HOME | End: 2024-10-26
Payer: COMMERCIAL

## 2024-10-23 PROCEDURE — 92526 ORAL FUNCTION THERAPY: CPT

## 2024-10-23 NOTE — PROGRESS NOTES
ST DAILY TREATMENT NOTE    Patient Name: Marky Abdul  Date:10/23/2024  : 1975  [x]  Patient  Verified  Payor: Payor: [employer] [facility] [place] / Plan: [facility] [employer] [facility] [place] / Product Type: *No Product type* /   In time:  Out time:12:00  Total Treatment Time (min): 37  Visit #: 4 of 4-8  PN due 24     Treatment Diagnosis: Dysphagia, pharyngoesophageal phase [R13.14]    SUBJECTIVE  Pain Level (0-10 scale): 0    Subjective functional status/changes:   [] No changes reported  Pt is accompanied by aide (Elyssa Peterson) during this session. Pt reported swallowing is doing well. Per pt's brother, pt is doing well with use of strategies, however, continues to require cues with use of alternating solids and liquids. Pt will have a f/u with GI at the end of this month. Pt continues to required increased moisture with food. D/w pt and caregiver re: expected d/c from OP ST on 10/28/24.     OBJECTIVE  Treatment provided includes:  Increase/Improve:  []  Voice Quality []  Cognitive Linguistic Skills [x]  Laryngeal/Pharyngeal Exercises   []  Vocal Loudness []  Reading Comprehension [x]  Swallowing Skills    []  Vocal Cord Function []  Auditory Comprehension [x]  Oral Motor Skills   []  Resonance []  Writing Skills []  Compensatory strategies    []  Speech Intelligibility []  Expressive Language []  Attention   []  Breath Support/Coord. []  Receptive language []  Memory   []  Articulation []  Safety Awareness [x] Pt educ   []  Fluency []  Word Retrieval []        Treatment Provided:  Dysphagia Treatment [76702]   -aspiration/GERD precautions  -pt/caregiver educ  -effortful swallow    Patient/Caregiver  Education: [x] Review HEP      HEP/Handouts given: Review HEP/aspiration/GERD precautions    Pain Level (0-10 scale) post treatment: 0    ASSESSMENT     [x]   Improving appropriately and progressing toward goals  []   Improving slowly and progressing toward goals  []   Approximating goals/maximum

## 2024-10-28 ENCOUNTER — HOSPITAL ENCOUNTER (OUTPATIENT)
Facility: HOSPITAL | Age: 49
Setting detail: RECURRING SERIES
Discharge: HOME | End: 2024-10-31
Payer: COMMERCIAL

## 2024-10-28 PROCEDURE — 92526 ORAL FUNCTION THERAPY: CPT

## 2024-10-28 NOTE — PROGRESS NOTES
[facility], [place] - [facility]  [address], [place] [address] Ph:[phone] Fx: [phone]    Speech Therapy Physician Update  Goals will be assigned and reassessed every 10 visits/ 30 days per Medicare guidelines  Current Reporting Period 10/4/24 to 24    [] Progress Note  [x] Discharge Summary    Patient Name: Marky Abdul : 1975   Treatment/Medical   Diagnosis: Dysphagia, oropharyngeal phase [R13.12]; Dysphagia, pharyngoesophageal phase [R13.14]    Onset Date: Chronic; referred 24      Referral Source: Ami Delatorre MD Start of Care (SOC): 24    Prior Hospitalization: See medical history Provider #: [id number]   Prior Level of Function: Chronic swallow deficits, stuttering at baseline, impaired cognition    Comorbidities: Hx of myotonic dystroophy, cholesteatoma Cognitive impairment, Musculoskeletal disorders, Neurologic condition, and Complications related to surgery      Medications: Verified on Patient Summary List     Visits from Start of Care: 7    Missed Visits: 1    The Plan of Care and following information is based on the patient's current status:  Short-term Goals:  Goal: 1) Pt/caregivers will comprehend, teach back, and utilize aspiration precautions to decrease risk of aspiration/penetration during PO intake of least restrictive diet in 80% of observed opportunities given modA   Status at last note/certification: not met; pt and caregiver were educ extensively on utilizing aspiration precautions. Pt's caregiver reported giving pt \"hefty\" meals which he endorsed would cause pain on his stomach. Rec using half portions of meals to decrease pt's PO rate and size, then serving the other half if pt indicates he would like some more. Pt and pt's caregiver provided aspiration mat. Pt and pt's caregiver receptive to information.      Pt's brother also reported that pt with increased water intake. Pt's brother reported increasing sauces 
Speech Therapy Discharge Instructions      [facility] - [facility]  [address] [place] [address]  [phone] fax    Patient: Marky Abdul  : 1975        Make sure to use your safe swallow/GERD strategies for every meal and for every time you eat something (e.g., small bites and sips, alternate solids and liquids, small meals throughout the day vs 3 big meals; sit upright during meals and at least 45 min after meals, extra gravy/sauces, slow down and take your time eating, empty mouth prior to taking another bite, swallow hard, eat an hour before sleeping).     Follow up with GI as recommended by MBSS evaluation.    Reach out to your doctor if you have any changes in swallowing, if you noticed increased swallowing difficulties, or if you have had instances of choking or pneumonia. You may also reach out to your doctor if you have any changes in cognition, memory, voice, swallowing, speech, and language for a possible re-referral to Speech Therapy as indicated by your doctor.     Call 911 if you experience sudden chest pain, weakness, slurred speech, seizures, bleeding, choking, facial drooping, etc.     Please reach out to us if you have any questions re: any discussed topics in Speech Therapy. Our number is [phone].    It has been a pleasure working with you,    Meri Aleman M.A., CCC-SLP  Speech Language Pathologist        
     [x]  Discharge due to: maximum benefit  [] Other:    Meri Aleman M.A., CCC-SLP  Speech Language Pathologist   10/28/2024  11:20 AM    Future Appointments   Date Time Provider Department Center   10/28/2024 11:20 AM Meri Aleman, SLP [facility]   10/30/2024 11:30 AM Ami Delatorre MD [facility]   11/4/2024 11:20 AM Meri Aleman, SLP [facility]   11/6/2024 11:20 AM Meri Aleman, SLP [facility]   11/11/2024 11:20 AM Meri Aleman, SLP [facility]   11/13/2024 11:20 AM Meri Aleman, SLP [facility]   11/18/2024 11:20 AM Meri Aleman, SLP [facility]   11/20/2024 11:20 AM Meri Aleman, SLP [facility]   11/25/2024 11:20 AM Meri Aleman, SLP [facility]   4/15/2025 11:15 AM Ami Delatorre MD [facility]     If an interpreting service was utilized for treatment of this patient, the contents of this document represent the material reviewed with the patient via the .     
No

## 2025-04-15 ENCOUNTER — OFFICE VISIT (OUTPATIENT)
Facility: CLINIC | Age: 50
End: 2025-04-15
Payer: COMMERCIAL

## 2025-04-15 VITALS
RESPIRATION RATE: 18 BRPM | SYSTOLIC BLOOD PRESSURE: 110 MMHG | HEART RATE: 68 BPM | HEIGHT: 62 IN | OXYGEN SATURATION: 97 % | BODY MASS INDEX: 21.38 KG/M2 | WEIGHT: 116.2 LBS | DIASTOLIC BLOOD PRESSURE: 70 MMHG

## 2025-04-15 DIAGNOSIS — K59.09 OTHER CONSTIPATION: Primary | ICD-10-CM

## 2025-04-15 DIAGNOSIS — R09.89 CHOKING EPISODE: ICD-10-CM

## 2025-04-15 DIAGNOSIS — G71.00 MUSCULAR DYSTROPHY (HCC): ICD-10-CM

## 2025-04-15 DIAGNOSIS — R71.8 ELEVATED HEMATOCRIT: ICD-10-CM

## 2025-04-15 DIAGNOSIS — R26.89 POOR BALANCE: ICD-10-CM

## 2025-04-15 DIAGNOSIS — R79.89 ELEVATED LFTS: ICD-10-CM

## 2025-04-15 PROCEDURE — 99214 OFFICE O/P EST MOD 30 MIN: CPT | Performed by: FAMILY MEDICINE

## 2025-04-15 RX ORDER — ERYTHROMYCIN 5 MG/G
0.5 OINTMENT OPHTHALMIC ONCE
COMMUNITY
Start: 2025-04-09

## 2025-04-15 SDOH — ECONOMIC STABILITY: FOOD INSECURITY: WITHIN THE PAST 12 MONTHS, YOU WORRIED THAT YOUR FOOD WOULD RUN OUT BEFORE YOU GOT MONEY TO BUY MORE.: NEVER TRUE

## 2025-04-15 SDOH — ECONOMIC STABILITY: FOOD INSECURITY: WITHIN THE PAST 12 MONTHS, THE FOOD YOU BOUGHT JUST DIDN'T LAST AND YOU DIDN'T HAVE MONEY TO GET MORE.: NEVER TRUE

## 2025-04-15 ASSESSMENT — PATIENT HEALTH QUESTIONNAIRE - PHQ9
SUM OF ALL RESPONSES TO PHQ QUESTIONS 1-9: 0
2. FEELING DOWN, DEPRESSED OR HOPELESS: NOT AT ALL
SUM OF ALL RESPONSES TO PHQ QUESTIONS 1-9: 0
1. LITTLE INTEREST OR PLEASURE IN DOING THINGS: NOT AT ALL

## 2025-04-15 NOTE — PROGRESS NOTES
Marky Abdul is a 49 y.o. male complains of   Chief Complaint   Patient presents with    Follow-up       HPI: Here for routine follow-up.  Accompanied with the caretaker and his brother who is power of  health who has had power of  and he is a caretaker as well.  Patient has muscular dystrophy.  Ambulating with support but still does not have that upper extremity strength to use walker or cane.  Currently having no major concern.  Does have a irritable bowel syndrome.  Using Metamucil for constipation and probiotic for diarrhea.  Currently sitting comfortably in the wheelchair.  Appropriately dressed.  Not much verbal but understanding the conversation and nodding his head.  Since last visit had speech evaluation.  Discussed prevention for choking episode.  Follow the speech therapy recommendation.  Reviewed lab.  Elevated hematocrit and LFT.  Supposed to repeat the labs but brother forgot to take him but they will complete the lab probably today  Denies any headache or dizziness.  No chest pain or abdominal pain.  No leg swelling.  Appetite fair.  No weight changes.  No mood or behavior changes as well  CMP:  Lab Results   Component Value Date/Time     01/06/2022 03:06 PM    K 4.4 09/23/2022 01:15 PM     01/06/2022 03:06 PM    CO2 33 01/06/2022 03:06 PM    BUN 13 01/06/2022 03:06 PM    CREATININE 0.41 01/06/2022 03:06 PM    GLUCOSE 85 01/06/2022 03:06 PM    CALCIUM 9.0 01/06/2022 03:06 PM    BILITOT 1.1 09/12/2024 11:53 AM    AST 31 09/12/2024 11:53 AM    ALT 65 09/12/2024 11:53 AM        POC Glucose: No results found for: \"POCGLU\"     CBC:  Lab Results   Component Value Date/Time    WBC 5.6 09/12/2024 11:53 AM    RBC 5.23 09/12/2024 11:53 AM    HGB 15.1 09/12/2024 11:53 AM    HCT 49.2 09/12/2024 11:53 AM    MCV 94.1 09/12/2024 11:53 AM    MCH 28.9 09/12/2024 11:53 AM    MCHC 30.7 09/12/2024 11:53 AM    RDW 14.7 09/12/2024 11:53 AM     09/12/2024 11:53 AM    MPV 11.5